# Patient Record
Sex: MALE | Race: BLACK OR AFRICAN AMERICAN | Employment: OTHER | URBAN - METROPOLITAN AREA
[De-identification: names, ages, dates, MRNs, and addresses within clinical notes are randomized per-mention and may not be internally consistent; named-entity substitution may affect disease eponyms.]

---

## 2017-01-11 ENCOUNTER — OFFICE VISIT (OUTPATIENT)
Dept: ENDOCRINOLOGY | Age: 72
End: 2017-01-11

## 2017-01-11 VITALS
BODY MASS INDEX: 43.63 KG/M2 | WEIGHT: 278 LBS | HEIGHT: 67 IN | DIASTOLIC BLOOD PRESSURE: 74 MMHG | HEART RATE: 89 BPM | SYSTOLIC BLOOD PRESSURE: 132 MMHG

## 2017-01-11 DIAGNOSIS — E11.8 TYPE 2 DIABETES MELLITUS WITH COMPLICATION, WITHOUT LONG-TERM CURRENT USE OF INSULIN (HCC): Primary | ICD-10-CM

## 2017-01-11 DIAGNOSIS — I10 ESSENTIAL HYPERTENSION: ICD-10-CM

## 2017-01-11 DIAGNOSIS — N28.9 MILD RENAL INSUFFICIENCY: ICD-10-CM

## 2017-01-11 NOTE — PROGRESS NOTES
History of Present Illness: Moisés Howard is a 66 y.o. male presents for follow-up of diabetes. Also has HTN. + microalbuminuria (improved 2015). Has CKD -overall trend has been stable     A1c has been higher than home glucose monitoring would suggest. Fructosamine appeared more concordant with home monitoring    Current diabetes regimen:  Metformin 1 g BID  Victoza 1.8 mg  Glipizide 2.5 mg BID  Jardiance 10 mg daily. - may forget to take meds periodically    Glucoses:  110-160 range. Monitors generally fasting, but may at other times. Diet:   Not as good over holidays. Having more sugared beverages and pasta and sweets. Plans to get back on track    Weight overall stable. Has maintained small degree of weight loss. Exercise: less active over winter. Not using bike anymore    HTN: BP controlled   Taking amlodipine and losartan. Kidneys: has been variable, but overall stable    Lipids - controlled without statin. He prefers not to take     Social:lives with wife ( who has been dx with pre-diabetes) and teenage grandson - [place]  [facility] football fan - likes Benggilda, Samina, [place] Fiore, Lucie . Past Medical History   Diagnosis Date    Arthritis      knee arthritis    Diabetes mellitus (HCC)     Hypertension     Obesity      Current Outpatient Prescriptions   Medication Sig    metFORMIN (GLUCOPHAGE) 1,000 mg tablet TAKE 1 TABLET TWICE A DAY WITH MEALS    amLODIPine (NORVASC) 10 mg tablet Take 1 Tab by mouth daily.  VICTOZA 3-RONI 0.6 mg/0.1 mL (18 mg/3 mL) sub-q pen INJECT 1.8 MG UNDER THE SKIN DAILY    losartan (COZAAR) 50 mg tablet TAKE 1 TABLET TWICE A DAY    glipiZIDE (GLUCOTROL) 5 mg tablet TAKE 1 TABLET TWICE A DAY    empagliflozin (JARDIANCE) 10 mg tablet Take 1 Tab by mouth daily.  UNIFINE PENTIPS PLUS 31 gauge x 1/4\" ndle FOR USE WITH INSULIN INJECT IONS DAILY    Insulin Needles, Disposable, (PEN NEEDLE) 32 x 5/32 \" ndle 1 each by SubCUTAneous route daily.     ONE TOUCH ULTRA TEST strip USE TO TEST BLOOD SUGAR THREE TIMES DAILY     No current facility-administered medications for this visit. Allergies   Allergen Reactions    Ace Inhibitors Cough       Review of Systems:  - Eyes: no blurry vision or double vision  - Cardiovascular: no chest pain  - Respiratory: no shortness of breath  - Musculoskeletal: no myalgias  - Neurological: no numbness/tingling in extremities. Following with foot doctor every 4 months    Physical Examination:  Visit Vitals    /74    Pulse 89    Ht 5' 7\" (1.702 m)    Wt 278 lb (126.1 kg)    BMI 43.54 kg/m2   -   - General: pleasant, no distress, normal gait   HEENT: hearing intact, EOMI, clear sclera without icterus  - Cardiovascular: regular, normal rate   - Respiratory: normal effort  - Integumentary: no edema  - Psychiatric: normal mood and affect    Data Reviewed:   Component      Latest Ref Rng & Units 9/14/2016 9/14/2016 9/14/2016 1/13/2016          11:02 AM 11:02 AM 11:02 AM 11:02 AM   Glucose      65 - 99 mg/dL  89     BUN      8 - 27 mg/dL  17     Creatinine      0.76 - 1.27 mg/dL  1.30 (H)     GFR est non-AA      >59 mL/min/1.73  54 (L)     GFR est AA      >59 mL/min/1.73  63     BUN/Creatinine ratio      10 - 22  13     Sodium      134 - 144 mmol/L  145 (H)     Potassium      3.5 - 5.2 mmol/L  4.4     Chloride      97 - 108 mmol/L  104     CO2      18 - 29 mmol/L  27     Calcium      8.6 - 10.2 mg/dL  9.3     Cholesterol, total      100 - 199 mg/dL    150   Triglyceride      0 - 149 mg/dL    74   HDL Cholesterol      >39 mg/dL    59   VLDL, calculated      5 - 40 mg/dL    15   LDL, calculated      0 - 99 mg/dL    76   Creatinine, urine      22.0 - 328.0 mg/dL       Microalbumin, urine      0.0 - 17.0 ug/mL       Microalbumin/Creat.  Ratio      0.0 - 30.0 mg/g creat       Hemoglobin A1c, (calculated)      4.8 - 5.6 %   7.4 (H)    Estimated average glucose      mg/dL   166    Fructosamine      0 - 285 umol/L 273 Component      Latest Ref Rng & Units 1/13/2016          11:02 AM   Glucose      65 - 99 mg/dL    BUN      8 - 27 mg/dL    Creatinine      0.76 - 1.27 mg/dL    GFR est non-AA      >59 mL/min/1.73    GFR est AA      >59 mL/min/1.73    BUN/Creatinine ratio      10 - 22    Sodium      134 - 144 mmol/L    Potassium      3.5 - 5.2 mmol/L    Chloride      97 - 108 mmol/L    CO2      18 - 29 mmol/L    Calcium      8.6 - 10.2 mg/dL    Cholesterol, total      100 - 199 mg/dL    Triglyceride      0 - 149 mg/dL    HDL Cholesterol      >39 mg/dL    VLDL, calculated      5 - 40 mg/dL    LDL, calculated      0 - 99 mg/dL    Creatinine, urine      22.0 - 328.0 mg/dL 111.8   Microalbumin, urine      0.0 - 17.0 ug/mL 88.7 (H)   Microalbumin/Creat. Ratio      0.0 - 30.0 mg/g creat 79.3 (H)       Assessment/Plan:   1. Type 2 diabetes mellitus with complication, without long-term current use of insulin ([facility])   - will check fructosamine and A1c due to prior discordance of A1c with home monitoring  - encouraged him to improve dietary and exercise efforts  - continue current medications. 2. Essential hypertension   - controlled. Continue losartan and amlodipine   3. BMI 40.0-44.9, adult ([address]   - encouraged further wt loss via dietary and exercise efforts to help maintain diabetes control. Also should help overall health   4. Mild renal insufficiency - repeat . BP and glucose contorl     Patient Instructions   Diabetes:    Continue efforts to improve diet and exercise work towards weight loss    Medications:  Continue metformin  Continue Victoza to 1.8 mg daily  Continue glipizide - take 1/2 tablet twice daily. Continue Jardiance 10 mg     Goals for blood sugars:  Fasting  (less than 150)  Other times -  (less than 180).     Blood pressure:  Continue  losartan to 50 mg twice daily  Continue amlodipine    Kidneys:  Look stable  Blood pressure and glucose control      Follow-up Disposition:  Return in about 4 months (around 5/11/2017).     Copy sent to:

## 2017-01-11 NOTE — MR AVS SNAPSHOT
Visit Information Date & Time Provider Department Dept. Phone Encounter #  
 1/11/2017  9:10 AM Júnior Erazo, [address] [place] [address] Diabetes and Endocrinology [id number] Follow-up Instructions Return in about 4 months (around 5/11/2017). Your Appointments 5/17/2017  9:00 AM  
ROUTINE CARE with Patricia Edgar MD  
[facility] [address]) Appt Note: 6 mon f/u for htn,etc  
 [facility] [address] [phone]  
[address] [facility] [address] Upcoming Health Maintenance Date Due Pneumococcal 65+ High/Highest Risk (2 of 2 - PPSV23) 7/11/2016 MICROALBUMIN Q1 1/13/2017 LIPID PANEL Q1 1/13/2017 HEMOGLOBIN A1C Q6M 3/14/2017 EYE EXAM RETINAL OR DILATED Q1 5/2/2017 MEDICARE YEARLY EXAM 5/17/2017 FOOT EXAM Q1 12/1/2017 GLAUCOMA SCREENING Q2Y 5/2/2018 COLONOSCOPY 5/15/2019 DTaP/Tdap/Td series (2 - Td) 5/28/2025 Allergies as of 1/11/2017  Review Complete On: 1/11/2017 By: Júnior Erazo MD  
  
 Severity Noted Reaction Type Reactions Ace Inhibitors  04/18/2014   Side Effect Cough Current Immunizations  Reviewed on 9/4/2013 Name Date Influenza Vaccine 11/26/2014 11:24 AM, 9/4/2013 Influenza Vaccine Deejay Melisa) 11/23/2015 Influenza Vaccine (Quad) PF 11/17/2016 Tdap 5/28/2015 Not reviewed this visit You Were Diagnosed With   
  
 Codes Comments Type 2 diabetes mellitus with complication, without long-term current use of insulin (HCC)    -  Primary ICD-10-CM: E11.8 ICD-9-CM: 250.90 Essential hypertension     ICD-10-CM: I10 
ICD-9-CM: 401.9 BMI 40.0-44.9, adult [facility])     ICD-10-CM: Z68.41 
ICD-9-CM: V85.41 Mild renal insufficiency     ICD-10-CM: N28.9 ICD-9-CM: 593.9 Vitals BP Pulse Height(growth percentile) Weight(growth percentile) BMI Smoking Status 132/74 89 5' 7\" (1.702 m) 278 lb (126.1 kg) 43.54 kg/m2 Former Smoker Vitals History BMI and BSA Data Body Mass Index Body Surface Area 43.54 kg/m 2 2.44 m 2 Preferred Pharmacy Pharmacy Name Phone [facility] [phone] - [address], [address] AT [place] [address] [phone] Your Updated Medication List  
  
   
This list is accurate as of: 1/11/17  9:57 AM.  Always use your most recent med list. amLODIPine 10 mg tablet Commonly known as:  Eloise Pace Take 1 Tab by mouth daily. empagliflozin 10 mg tablet Commonly known as:  Romilda Ponds Take 1 Tab by mouth daily. glipiZIDE 5 mg tablet Commonly known as:  GLUCOTROL  
TAKE 1 TABLET TWICE A DAY Insulin Needles (Disposable) 32 gauge x 5/32\" Ndle Commonly known as:  PEN NEEDLE  
1 each by SubCUTAneous route daily. UNIFINE PENTIPS PLUS 31 gauge x 1/4\" Ndle Generic drug:  Insulin Needles (Disposable) FOR USE WITH INSULIN INJECT IONS DAILY losartan 50 mg tablet Commonly known as:  COZAAR  
TAKE 1 TABLET TWICE A DAY  
  
 metFORMIN 1,000 mg tablet Commonly known as:  GLUCOPHAGE  
TAKE 1 TABLET TWICE A DAY WITH MEALS  
  
 ONETOUCH ULTRA TEST strip Generic drug:  glucose blood VI test strips USE TO TEST BLOOD SUGAR THREE TIMES DAILY  
  
 VICTOZA 3-RONI 0.6 mg/0.1 mL (18 mg/3 mL) sub-q pen Generic drug:  Liraglutide INJECT 1.8 MG UNDER THE SKIN DAILY We Performed the Following FRUCTOSAMINE [69064 CPT(R)] [place] COLLECTION VENOUS BLOOD,VENIPUNCTURE [id number] CPT(R)] [place] HANDLG&/OR CONVEY OF SPEC FOR TR OFFICE TO LAB [78636 CPT(R)] Follow-up Instructions Return in about 4 months (around 5/11/2017). Patient Instructions Diabetes: 
 
Continue efforts to improve diet and exercise work towards weight loss Medications: 
Continue metformin Continue Victoza to 1.8 mg daily Continue glipizide - take 1/2 tablet twice daily. Continue Jardiance 10 mg  
 
Goals for blood sugars: 
Fasting  (less than 150) Other times -  (less than 180). Blood pressure: 
Continue  losartan to 50 mg twice daily Continue amlodipine Kidneys: 
Look stable Blood pressure and glucose control Introducing \[facility]\"" & HEALTH SERVICES! Dear Monik Carlose: Thank you for requesting a [employer] account. Our records indicate that you already have an active [employer] account. You can access your account anytime at [email or web address] Did you know that you can access your hospital and ER discharge instructions at any time in Icecreaml[employer]? You can also review all of your test results from your hospital stay or ER visit. Additional Information If you have questions, please visit the Frequently Asked Questions section of the [employer] website at [email or web address] Remember, Icecre[employer]abs is NOT to be used for urgent needs. For medical emergencies, dial 911. Now available from your iPhone and Android! Please provide this summary of care documentation to your next provider. Your primary care clinician is listed as [place]. If you have any questions after today's visit, please call [phone].

## 2017-01-11 NOTE — PATIENT INSTRUCTIONS
Diabetes:    Continue efforts to improve diet and exercise work towards weight loss    Medications:  Continue metformin  Continue Victoza to 1.8 mg daily  Continue glipizide - take 1/2 tablet twice daily. Continue Jardiance 10 mg     Goals for blood sugars:  Fasting  (less than 150)  Other times -  (less than 180).     Blood pressure:  Continue  losartan to 50 mg twice daily  Continue amlodipine    Kidneys:  Look stable  Blood pressure and glucose control

## 2017-01-14 RX ORDER — PEN NEEDLE, DIABETIC 31 GX5/16"
NEEDLE, DISPOSABLE MISCELLANEOUS
Qty: 100 PEN NEEDLE | Refills: 3 | Status: SHIPPED | OUTPATIENT
Start: 2017-01-14 | End: 2018-05-07 | Stop reason: SDUPTHER

## 2017-04-05 RX ORDER — EMPAGLIFLOZIN 10 MG/1
TABLET, FILM COATED ORAL
Qty: 90 TAB | Refills: 2 | Status: SHIPPED | OUTPATIENT
Start: 2017-04-05 | End: 2018-01-10 | Stop reason: SDUPTHER

## 2017-04-05 RX ORDER — LOSARTAN POTASSIUM 50 MG/1
TABLET ORAL
Qty: 180 TAB | Refills: 1 | Status: SHIPPED | OUTPATIENT
Start: 2017-04-05 | End: 2017-10-17 | Stop reason: SDUPTHER

## 2017-05-10 ENCOUNTER — OFFICE VISIT (OUTPATIENT)
Dept: ENDOCRINOLOGY | Age: 72
End: 2017-05-10

## 2017-05-10 VITALS
BODY MASS INDEX: 44.26 KG/M2 | DIASTOLIC BLOOD PRESSURE: 65 MMHG | HEIGHT: 67 IN | WEIGHT: 282 LBS | HEART RATE: 87 BPM | SYSTOLIC BLOOD PRESSURE: 134 MMHG

## 2017-05-10 DIAGNOSIS — E11.29 TYPE 2 DIABETES MELLITUS WITH MICROALBUMINURIA, WITHOUT LONG-TERM CURRENT USE OF INSULIN (HCC): Primary | ICD-10-CM

## 2017-05-10 DIAGNOSIS — R80.9 TYPE 2 DIABETES MELLITUS WITH MICROALBUMINURIA, WITHOUT LONG-TERM CURRENT USE OF INSULIN (HCC): Primary | ICD-10-CM

## 2017-05-10 DIAGNOSIS — I10 HTN (HYPERTENSION), BENIGN: ICD-10-CM

## 2017-05-10 DIAGNOSIS — M25.50 ARTHRALGIA, UNSPECIFIED JOINT: ICD-10-CM

## 2017-05-10 RX ORDER — BLOOD SUGAR DIAGNOSTIC
STRIP MISCELLANEOUS
Qty: 100 STRIP | Refills: 3 | Status: SHIPPED | OUTPATIENT
Start: 2017-05-10 | End: 2019-02-26 | Stop reason: SDUPTHER

## 2017-05-10 NOTE — MR AVS SNAPSHOT
Visit Information Date & Time Provider Department Dept. Phone Encounter #  
 5/10/2017  9:50 AM Pauline Kitchen, [address] [facility] Diabetes and Endocrinology [phone] [id number] Follow-up Instructions Return in about 4 months (around 9/10/2017). Your Appointments 5/17/2017  9:00 AM  
ROUTINE CARE with Kyrie Mendez MD  
[facility]) Appt Note: 6 mon f/u for htn,etc  
 [facility] [address] [phone]  
[address] [facility] [address] [facility] Upcoming Health Maintenance Date Due Pneumococcal 65+ Low/Medium Risk (1 of 2 - PCV13) 6/30/2008 EYE EXAM RETINAL OR DILATED Q1 5/2/2017 MEDICARE YEARLY EXAM 5/17/2017 HEMOGLOBIN A1C Q6M 7/11/2017 INFLUENZA AGE 9 TO ADULT 8/1/2017 FOOT EXAM Q1 12/1/2017 MICROALBUMIN Q1 1/11/2018 LIPID PANEL Q1 1/11/2018 GLAUCOMA SCREENING Q2Y 5/2/2018 COLONOSCOPY 5/15/2019 DTaP/Tdap/Td series (2 - Td) 5/28/2025 Allergies as of 5/10/2017  Review Complete On: 5/10/2017 By: Pauline Kitchen MD  
  
 Severity Noted Reaction Type Reactions Ace Inhibitors  04/18/2014   Side Effect Cough Current Immunizations  Reviewed on 9/4/2013 Name Date Influenza Vaccine 11/26/2014 11:24 AM, 9/4/2013 Influenza Vaccine Torey Titus) 11/23/2015 Influenza Vaccine (Quad) PF 11/17/2016 Tdap 5/28/2015 Not reviewed this visit You Were Diagnosed With   
  
 Codes Comments Type 2 diabetes mellitus with microalbuminuria, without long-term current use of insulin (HCC)    -  Primary ICD-10-CM: E11.29, R80.9 ICD-9-CM: 250.40, 791.0   
 HTN (hypertension), benign     ICD-10-CM: I10 
ICD-9-CM: 401.1 BMI 40.0-44.9, adult [facility])     ICD-10-CM: Z68.41 
ICD-9-CM: V85.41 Arthralgia, unspecified joint     ICD-10-CM: M25.50 ICD-9-CM: 719.40 Vitals BP Pulse Height(growth percentile) Weight(growth percentile) BMI Smoking Status 134/65 87 5' 7\" (1.702 m) 282 lb (127.9 kg) 44.17 kg/m2 Former Smoker Vitals History BMI and BSA Data Body Mass Index Body Surface Area  
 44.17 kg/m 2 2.46 m 2 Preferred Pharmacy Pharmacy Name Phone [address] [phone] Your Updated Medication List  
  
   
This list is accurate as of: 5/10/17 10:42 AM.  Always use your most recent med list. amLODIPine 10 mg tablet Commonly known as:  Lizzy Joe Take 1 Tab by mouth daily. glipiZIDE 5 mg tablet Commonly known as:  GLUCOTROL  
TAKE 1 TABLET TWICE A DAY Insulin Needles (Disposable) 32 gauge x 5/32\" Ndle Commonly known as:  PEN NEEDLE  
1 each by SubCUTAneous route daily. * UNIFINE PENTIPS PLUS 31 gauge x 1/4\" Ndle Generic drug:  Insulin Needles (Disposable) FOR USE WITH INSULIN INJECT IONS DAILY * BD INSULIN PEN NEEDLE UF MINI 31 gauge x 3/16\" Ndle Generic drug:  Insulin Needles (Disposable) FOR USE WITH INSULIN INJECTIONS DAILY JARDIANCE 10 mg tablet Generic drug:  empagliflozin TAKE 1 TABLET DAILY losartan 50 mg tablet Commonly known as:  COZAAR  
TAKE 1 TABLET TWICE A DAY  
  
 metFORMIN 1,000 mg tablet Commonly known as:  GLUCOPHAGE  
TAKE 1 TABLET TWICE A DAY WITH MEALS  
  
 ONETOUCH VERIO strip Generic drug:  glucose blood VI test strips Monitor daily VICTOZA 3-RONI 0.6 mg/0.1 mL (18 mg/3 mL) sub-q pen Generic drug:  Liraglutide INJECT 1.8 MG UNDER THE SKIN DAILY * Notice: This list has 2 medication(s) that are the same as other medications prescribed for you. Read the directions carefully, and ask your doctor or other care provider to review them with you. Prescriptions Sent to Pharmacy Refills ONETOUCH VERIO strip 3 Sig: Monitor daily  Class: Normal  
 Pharmacy: [address], [address] [place] [address] #: [phone] We Performed the Following FRUCTOSAMINE [14610 CPT(R)] HEMOGLOBIN A1C WITH EAG [40444 CPT(R)] METABOLIC PANEL, COMPREHENSIVE [04482 CPT(R)] CO COLLECTION VENOUS BLOOD,VENIPUNCTURE [id number] CPT(R)] CO HANDLG&/OR CONVEY OF SPEC FOR TR OFFICE TO LAB [33264 CPT(R)] Follow-up Instructions Return in about 4 months (around 9/10/2017). Patient Instructions Diabetes: 
 
Continue efforts to improve diet and exercise work towards weight loss Medications: 
Continue metformin Continue Victoza to 1.8 mg daily Continue glipizide -MAY have you increase this to whole tablet twice daily if you consistently see glucoses above goal. Goal would be to lose as little of this as you need. Decrease if you have lower values (<90) Continue Jardiance 10 mg  
 
Goals for blood sugars: 
Fasting  (less than 150) Other times -  (less than 180). Blood pressure: 
Continue  losartan to 50 mg twice daily Continue amlodipine Kidneys: 
Look stable Blood pressure and glucose control Introducing [facility] & HEALTH SERVICES! Dear Jean Pierre Skill: Thank you for requesting a [employer] account. Our records indicate that you already have an active [employer] account. You can access your account anytime at [email or web address] Did you know that you can access your hospital and ER discharge instructions at any time in Learn[employer]? You can also review all of your test results from your hospital stay or ER visit. Additional Information If you have questions, please visit the Frequently Asked Questions section of the [employer] website at [email or web address] Remember, [employer] is NOT to be used for urgent needs. For medical emergencies, dial 911. Now available from your iPhone and Android! Please provide this summary of care documentation to your next provider. Your primary care clinician is listed as [place]. If you have any questions after today's visit, please call [phone].

## 2017-05-10 NOTE — PATIENT INSTRUCTIONS
Diabetes:    Continue efforts to improve diet and exercise work towards weight loss    Medications:  Continue metformin  Continue Victoza to 1.8 mg daily  Continue glipizide -MAY have you increase this to whole tablet twice daily if you consistently see glucoses above goal. Goal would be to lose as little of this as you need. Decrease if you have lower values (<90)  Continue Jardiance 10 mg     Goals for blood sugars:  Fasting  (less than 150)  Other times -  (less than 180).     Blood pressure:  Continue  losartan to 50 mg twice daily  Continue amlodipine    Kidneys:  Look stable  Blood pressure and glucose control

## 2017-05-10 NOTE — PROGRESS NOTES
History of Present Illness: Ratna Ag is a 66 y.o. male presents for follow-up of diabetes. Also has HTN. + microalbuminuria. Has CKD -overall trend has been stable     Current diabetes regimen:  Metformin 1 g BID  Victoza 1.8 mg  Glipizide 2.5 mg BID  Jardiance 10 mg daily. Glucoses:  No log or meter. This AM value was 176. Other meter said 115. 111 in office    Diet:   Generally eating pretty good, but sometimes 'stray away'. Had some apple pie last night. Weight overall stable. Exercise: limited by arthralgias. HTN: BP controlled   Taking amlodipine and losartan. Kidneys: has been variable, but overall stable    Lipids - controlled without statin. He prefers not to take     Social:lives with wife and teenage grandson - [place]      Past Medical History:   Diagnosis Date    Arthritis     knee arthritis    Diabetes mellitus ([facility] [address]     Hypertension     Obesity      Current Outpatient Prescriptions   Medication Sig    JARDIANCE 10 mg tablet TAKE 1 TABLET DAILY    losartan (COZAAR) 50 mg tablet TAKE 1 TABLET TWICE A DAY    BD INSULIN PEN NEEDLE UF MINI 31 gauge x 3/16\" ndle FOR USE WITH INSULIN INJECTIONS DAILY    metFORMIN (GLUCOPHAGE) 1,000 mg tablet TAKE 1 TABLET TWICE A DAY WITH MEALS    amLODIPine (NORVASC) 10 mg tablet Take 1 Tab by mouth daily.  VICTOZA 3-RONI 0.6 mg/0.1 mL (18 mg/3 mL) sub-q pen INJECT 1.8 MG UNDER THE SKIN DAILY    glipiZIDE (GLUCOTROL) 5 mg tablet TAKE 1 TABLET TWICE A DAY    UNIFINE PENTIPS PLUS 31 gauge x 1/4\" ndle FOR USE WITH INSULIN INJECT IONS DAILY    Insulin Needles, Disposable, (PEN NEEDLE) 32 x 5/32 \" ndle 1 each by SubCUTAneous route daily.  ONE TOUCH ULTRA TEST strip USE TO TEST BLOOD SUGAR THREE TIMES DAILY     No current facility-administered medications for this visit.       Allergies   Allergen Reactions    Ace Inhibitors Cough       Review of Systems:  - Eyes: no blurry vision or double vision  - Cardiovascular: no chest pain  - Respiratory: no shortness of breath  - Musculoskeletal: see HPI  - Neurological: no numbness/tingling in extremities    Physical Examination:  Visit Vitals    /65    Pulse 87    Ht 5' 7\" (1.702 m)    Wt 282 lb (127.9 kg)    BMI 44.17 kg/m2   -   - General: pleasant, no distress, normal gait   HEENT: hearing intact, EOMI, clear sclera without icterus  - Cardiovascular: regular, normal rate   - Respiratory: normal effort  - Integumentary: no edema. No apparent swelling of joints  - Psychiatric: normal mood and affect    Data Reviewed:   Component      Latest Ref Rng & Units 1/11/2017 1/11/2017 1/11/2017 1/11/2017          12:00 AM 12:00 AM 12:00 AM 12:00 AM   Glucose      65 - 99 mg/dL    92   BUN      8 - 27 mg/dL    16   Creatinine      0.76 - 1.27 mg/dL    1.28 (H)   GFR est non-AA      >59 mL/min/1.73    55 (L)   GFR est AA      >59 mL/min/1.73    64   BUN/Creatinine ratio      10 - 22    13   Sodium      134 - 144 mmol/L    141   Potassium      3.5 - 5.2 mmol/L    4.2   Chloride      96 - 106 mmol/L    100   CO2      18 - 29 mmol/L    22   Calcium      8.6 - 10.2 mg/dL    9.6   Protein, total      6.0 - 8.5 g/dL    7.9   Albumin      3.5 - 4.8 g/dL    4.3   GLOBULIN, TOTAL      1.5 - 4.5 g/dL    3.6   A-G Ratio      1.1 - 2.5    1.2   Bilirubin, total      0.0 - 1.2 mg/dL    0.6   Alk. phosphatase      39 - 117 IU/L    70   AST      0 - 40 IU/L    15   ALT      0 - 44 IU/L    15   Cholesterol, total      100 - 199 mg/dL   158    Triglyceride      0 - 149 mg/dL   91    HDL Cholesterol      >39 mg/dL   56    VLDL, calculated      5 - 40 mg/dL   18    LDL, calculated      0 - 99 mg/dL   84    Creatinine, urine      Not Estab. mg/dL  82.3     Microalbumin, urine      Not Estab. ug/mL  70.7     Microalbumin/Creat.  Ratio      0.0 - 30.0 mg/g creat  85.9 (H)     Hemoglobin A1c, (calculated)      4.8 - 5.6 %       Estimated average glucose      mg/dL       Fructosamine      0 - 285 umol/L 306 (H) Component      Latest Ref Rng & Units 1/11/2017          12:00 AM   Glucose      65 - 99 mg/dL    BUN      8 - 27 mg/dL    Creatinine      0.76 - 1.27 mg/dL    GFR est non-AA      >59 mL/min/1.73    GFR est AA      >59 mL/min/1.73    BUN/Creatinine ratio      10 - 22    Sodium      134 - 144 mmol/L    Potassium      3.5 - 5.2 mmol/L    Chloride      96 - 106 mmol/L    CO2      18 - 29 mmol/L    Calcium      8.6 - 10.2 mg/dL    Protein, total      6.0 - 8.5 g/dL    Albumin      3.5 - 4.8 g/dL    GLOBULIN, TOTAL      1.5 - 4.5 g/dL    A-G Ratio      1.1 - 2.5    Bilirubin, total      0.0 - 1.2 mg/dL    Alk. phosphatase      39 - 117 IU/L    AST      0 - 40 IU/L    ALT      0 - 44 IU/L    Cholesterol, total      100 - 199 mg/dL    Triglyceride      0 - 149 mg/dL    HDL Cholesterol      >39 mg/dL    VLDL, calculated      5 - 40 mg/dL    LDL, calculated      0 - 99 mg/dL    Creatinine, urine      Not Estab. mg/dL    Microalbumin, urine      Not Estab. ug/mL    Microalbumin/Creat. Ratio      0.0 - 30.0 mg/g creat    Hemoglobin A1c, (calculated)      4.8 - 5.6 % 7.7 (H)   Estimated average glucose      mg/dL 174   Fructosamine      0 - 285 umol/L        Assessment/Plan:   1. Type 2 diabetes mellitus with microalbuminuria, without long-term current use of insulin ([facility])   - check a1c. Optimistic value will be improved  - encouraged dietary efforts, increase in exercise and weight loss. Could stop glipizide if lows arise. Conversely, can increase if needed for higher values. Continue other medications: victoza, Jardiance, metformin   2. HTN (hypertension), benign   - continue losartan and amlodipine. Controlled. 3. BMI 40.0-44.9, adult ([address] - encouraged dietary and exercise efforts. Not taking glipizide would help, if glucoses are controlled without. 4. Arthralgia, unspecified joint   - suspect this may improve with dietary efforts, regular exercise (increasing gradually) and assurance of good sleep.       Patient Instructions   Diabetes:    Continue efforts to improve diet and exercise work towards weight loss    Medications:  Continue metformin  Continue Victoza to 1.8 mg daily  Continue glipizide -MAY have you increase this to whole tablet twice daily if you consistently see glucoses above goal. Goal would be to lose as little of this as you need. Decrease if you have lower values (<90)  Continue Jardiance 10 mg     Goals for blood sugars:  Fasting  (less than 150)  Other times -  (less than 180). Blood pressure:  Continue  losartan to 50 mg twice daily  Continue amlodipine    Kidneys:  Look stable  Blood pressure and glucose control      Follow-up Disposition:  Return in about 4 months (around 9/10/2017).     Copy sent to:

## 2017-05-11 LAB
ALBUMIN SERPL-MCNC: 4.4 G/DL (ref 3.5–4.8)
ALBUMIN/GLOB SERPL: 1.3 {RATIO} (ref 1.2–2.2)
ALP SERPL-CCNC: 71 IU/L (ref 39–117)
ALT SERPL-CCNC: 16 IU/L (ref 0–44)
AST SERPL-CCNC: 15 IU/L (ref 0–40)
BILIRUB SERPL-MCNC: 0.4 MG/DL (ref 0–1.2)
BUN SERPL-MCNC: 15 MG/DL (ref 8–27)
BUN/CREAT SERPL: 12 (ref 10–24)
CALCIUM SERPL-MCNC: 9.3 MG/DL (ref 8.6–10.2)
CHLORIDE SERPL-SCNC: 104 MMOL/L (ref 96–106)
CO2 SERPL-SCNC: 24 MMOL/L (ref 18–29)
CREAT SERPL-MCNC: 1.22 MG/DL (ref 0.76–1.27)
EST. AVERAGE GLUCOSE BLD GHB EST-MCNC: 186 MG/DL
FRUCTOSAMINE SERPL-SCNC: 334 UMOL/L (ref 0–285)
GLOBULIN SER CALC-MCNC: 3.4 G/DL (ref 1.5–4.5)
GLUCOSE SERPL-MCNC: 96 MG/DL (ref 65–99)
HBA1C MFR BLD: 8.1 % (ref 4.8–5.6)
INTERPRETATION: NORMAL
Lab: NORMAL
POTASSIUM SERPL-SCNC: 4.7 MMOL/L (ref 3.5–5.2)
PROT SERPL-MCNC: 7.8 G/DL (ref 6–8.5)
SODIUM SERPL-SCNC: 146 MMOL/L (ref 134–144)

## 2017-05-17 ENCOUNTER — OFFICE VISIT (OUTPATIENT)
Dept: INTERNAL MEDICINE CLINIC | Age: 72
End: 2017-05-17

## 2017-05-17 VITALS
RESPIRATION RATE: 16 BRPM | BODY MASS INDEX: 44.17 KG/M2 | DIASTOLIC BLOOD PRESSURE: 69 MMHG | SYSTOLIC BLOOD PRESSURE: 126 MMHG | HEIGHT: 67 IN | HEART RATE: 83 BPM | OXYGEN SATURATION: 95 % | TEMPERATURE: 98.1 F | WEIGHT: 281.4 LBS

## 2017-05-17 DIAGNOSIS — I10 ESSENTIAL HYPERTENSION: ICD-10-CM

## 2017-05-17 DIAGNOSIS — N28.9 MILD RENAL INSUFFICIENCY: ICD-10-CM

## 2017-05-17 DIAGNOSIS — Z13.39 SCREENING FOR ALCOHOLISM: ICD-10-CM

## 2017-05-17 DIAGNOSIS — Z00.00 ROUTINE GENERAL MEDICAL EXAMINATION AT A HEALTH CARE FACILITY: Primary | ICD-10-CM

## 2017-05-17 NOTE — PROGRESS NOTES
1. Have you been to the ER, urgent care clinic since your last visit? Hospitalized since your last visit?no    2. Have you seen or consulted any other health care providers outside of the [facility] since your last visit? Include any pap smears or colon screening.  no

## 2017-05-17 NOTE — PATIENT INSTRUCTIONS
Medicare Part B Preventive Services Limitations Recommendation Scheduled   Bone Mass Measurement  (age 72 & older, biennial) Requires diagnosis related to osteoporosis or estrogen deficiency. Biennial benefit unless patient has history of long-term glucocorticoid tx or baseline is needed because initial test was by other method     Cardiovascular Screening Blood Tests (every 5 years)  Total cholesterol, HDL, Triglycerides Order as a panel if possible     Colorectal Cancer Screening  -Fecal occult blood test (annual)  -Flexible sigmoidoscopy (5y)  -Screening colonoscopy (10y)  -Barium Enema      Counseling to Prevent Tobacco Use (up to 8 sessions per year)  - Counseling greater than 3 and up to 10 minutes  - Counseling greater than 10 minutes Patients must be asymptomatic of tobacco-related conditions to receive as preventive service     Diabetes Screening Tests (at least every 3 years, Medicare covers annually or at 6-month intervals for prediabetic patients)    Fasting blood sugar (FBS) or glucose tolerance test (GTT) Patient must be diagnosed with one of the following:  -Hypertension, Dyslipidemia, obesity, previous impaired FBS or GTT  Or any two of the following: overweight, FH of diabetes, age ? 72, history of gestational diabetes, birth of baby weighing more than 9 pounds     Diabetes Self-Management Training (DSMT) (no USPSTF recommendation) Requires referral by treating physician for patient with diabetes or renal disease. 10 hours of initial DSMT session of no less than 30 minutes each in a continuous 12-month period. 2 hours of follow-up DSMT in subsequent years.      Glaucoma Screening (no USPSTF recommendation) Diabetes mellitus, family history, , age 48 or over,  American, age 72 or over     Human Immunodeficiency Virus (HIV) Screening (annually for increased risk patients)  HIV-1 and HIV-2 by EIA, KAREN, rapid antibody test, or oral mucosa transudate Patient must be at increased risk for HIV infection per USPSTF guidelines or pregnant. Tests covered annually for patients at increased risk. Pregnant patients may receive up to 3 test during pregnancy. Medical Nutrition Therapy (MNT) (for diabetes or renal disease not recommended schedule) Requires referral by treating physician for patient with diabetes or renal disease. Can be provided in same year as diabetes self-management training (DSMT), and CMS recommends medical nutrition therapy take place after DSMT. Up to 3 hours for initial year and 2 hours in subsequent years. Prostate Cancer Screening (annually up to age 76)  - Digital rectal exam (SAM)  - Prostate specific antigen (PSA) Annually (age 48 or over), SAM not paid separately when covered E/M service is provided on same date  Men up to age 76 may need a screening blood test for prostate cancer at certain intervals, depending on their personal and family history. This decision is between the patient and his provider. Seasonal Influenza Vaccination (annually)        Pneumococcal Vaccination (once after 72)      Hepatitis B Vaccinations (if medium/high risk) Medium/high risk factors:  End-stage renal disease,  Hemophiliacs who received Factor VIII or IX concentrates, Clients of institutions for the mentally retarded, Persons who live in the same house as a HepB virus carrier, Homosexual men, Illicit injectable drug abusers. Shingles Vaccination A shingles vaccine is also recommended once in a lifetime after age 61     Ultrasound Screening for Abdominal Aortic Aneurysm (AAA) (once) Patient must be referred through [facility] and not have had a screening for abdominal aortic aneurysm before under Medicare.   Limited to patients who meet one of the following criteria:  - Men who are 73-68 years old and have smoked more than 100 cigarettes in their lifetime.  -Anyone with a FH of AAA  -Anyone recommended for screening by USPSTF            Learning About Cutting Calories  How do calories affect your weight? Food gives your body energy. Energy from the food you eat is measured in calories. This energy keeps your heart beating, your brain active, and your muscles working. Your body needs a certain number of calories each day. After your body uses the calories it needs, it stores extra calories as fat. To lose weight safely, you have to eat fewer calories while eating in a healthy way. How many calories do you need each day? The more active you are, the more calories you need. When you are less active, you need fewer calories. How many calories you need each day also depends on several things, including your age and whether you are male or female. Here are some general guidelines for adults:  · Less active women and older adults need 1,600 to 2,000 calories each day. · Active women and less active men need 2,000 to 2,400 calories each day. · Active men need 2,400 to 3,000 calories each day. How can you cut calories and eat healthy meals? Whole grains, vegetables and fruits, and dried beans are good lower-calorie foods. They give you lots of nutrients and fiber. And they fill you up. Sweets, energy drinks, and soda pop are high in calories. They give you few nutrients and no fiber. Try to limit soda pop, fruit juice, and energy drinks. Drink water instead. Some fats can be part of a healthy diet. But cutting back on fats from highly processed foods like fast foods and many snack foods is a good way to lower the calories in your diet. Also, use smaller amounts of fats like butter, margarine, salad dressing, and mayonnaise. Add fresh garlic, lemon, or herbs to your meals to add flavor without adding fat. Meats and dairy products can be a big source of hidden fats. Try to choose lean or low-fat versions of these products. Fat-free cookies, candies, chips, and frozen treats can still be high in sugar and calories. Some fat-free foods have more calories than regular ones.  Eat fat-free treats in moderation, as you would other foods. If your favorite foods are high in fat, salt, sugar, or calories, limit how often you eat them. Eat smaller servings, or look for healthy substitutes. Fill up on fruits, vegetables, and whole grains. Eating at home  · Use meat as a side dish instead of as the main part of your meal.  · Try main dishes that use whole wheat pasta, brown rice, dried beans, or vegetables. · Find ways to cook with little or no fat, such as broiling, steaming, or grilling. · Use cooking spray instead of oil. If you use oil, use a monounsaturated oil, such as canola or olive oil. · Trim fat from meats before you cook them. · Drain off fat after you brown the meat or while you roast it. · Chill soups and stews after you cook them. Then skim the fat off the top after it hardens. Eating out  · Order foods that are broiled or poached rather than fried or breaded. · Cut back on the amount of butter or margarine that you use on bread. · Order sauces, gravies, and salad dressings on the side, and use only a little. · When you order pasta, choose tomato sauce rather than cream sauce. · Ask for salsa with your baked potato instead of sour cream, butter, cheese, or gipson. · Order meals in a small size instead of upgrading to a large. · Share an entree, or take part of your food home to eat as another meal.  · Share appetizers and desserts. Where can you learn more? Go to http://milka-pilar.info/. Enter 99 [id number] in the search box to learn more about \"Learning About Cutting Calories. \"  Current as of: November 9, 2016  Content Version: 11.2  © 6241-0564 [employer]. Care instructions adapted under license by [employer] (which disclaims liability or warranty for this information).  If you have questions about a medical condition or this instruction, always ask your healthcare professional. Susanne Maldonado disclaims any warranty or liability for your use of this information.

## 2017-05-17 NOTE — MR AVS SNAPSHOT
Visit Information Date & Time Provider Department Dept. Phone Encounter #  
 5/17/2017  9:00 AM Debbie Lopez, [address] [place] [address],[address] [phone] [id number] Follow-up Instructions Return in about 6 months (around 11/17/2017) for HTN, etc.  
  
Your Appointments 9/6/2017  8:30 AM  
Follow Up with Janneth Thomas MD  
[facility] [address] [place] [address]) Appt Note: f/u                                      4 month  
 [address] [facility] [address] [id number] [facility] Upcoming Health Maintenance Date Due  
 EYE EXAM RETINAL OR DILATED Q1 5/2/2017 MEDICARE YEARLY EXAM 5/17/2017 INFLUENZA AGE 9 TO ADULT 8/1/2017 HEMOGLOBIN A1C Q6M 11/10/2017 Pneumococcal 65+ Low/Medium Risk (2 of 2 - PPSV23) 11/17/2017 FOOT EXAM Q1 12/1/2017 MICROALBUMIN Q1 1/11/2018 LIPID PANEL Q1 1/11/2018 GLAUCOMA SCREENING Q2Y 5/2/2018 COLONOSCOPY 5/15/2019 DTaP/Tdap/Td series (2 - Td) 5/28/2025 Allergies as of 5/17/2017  Review Complete On: 5/17/2017 By: Debbie Lopez MD  
  
 Severity Noted Reaction Type Reactions Ace Inhibitors  04/18/2014   Side Effect Cough Current Immunizations  Reviewed on 5/17/2017 Name Date Influenza Vaccine 11/26/2014 11:24 AM, 9/4/2013 Influenza Vaccine Adelina Rice) 11/23/2015 Influenza Vaccine (Quad) PF 11/17/2016 Pneumococcal Conjugate (PCV-13) 11/17/2016 Tdap 5/28/2015 Reviewed by Debbie Lopez MD on 5/17/2017 at  9:37 AM  
 Reviewed by Debbie Lopez MD on 5/17/2017 at  9:37 AM  
You Were Diagnosed With   
  
 Codes Comments Essential hypertension    -  Primary ICD-10-CM: I10 
ICD-9-CM: 401.9 Mild renal insufficiency     ICD-10-CM: N28.9 ICD-9-CM: 593.9 Vitals BP Pulse Temp Resp Height(growth percentile) Weight(growth percentile) 126/69 (BP 1 Location: Left arm, BP Patient Position: Sitting) 83 98.1 °F (36.7 °C) (Oral) 16 5' 7\" (1.702 m) 281 lb 6.4 oz (127.6 kg) SpO2 BMI Smoking Status 95% 44.07 kg/m2 Former Smoker Vitals History BMI and BSA Data Body Mass Index Body Surface Area 44.07 kg/m 2 2.46 m 2 Preferred Pharmacy Pharmacy Name [facility]  [phone], [address] AT [place] [address] [phone] Your Updated Medication List  
  
   
This list is accurate as of: 5/17/17 10:04 AM.  Always use your most recent med list. amLODIPine 10 mg tablet Commonly known as:  Mary Half Take 1 Tab by mouth daily. glipiZIDE 5 mg tablet Commonly known as:  GLUCOTROL  
TAKE 1 TABLET TWICE A DAY Insulin Needles (Disposable) 32 gauge x 5/32\" Ndle Commonly known as:  PEN NEEDLE  
1 each by SubCUTAneous route daily. * UNIFINE PENTIPS PLUS 31 gauge x 1/4\" Ndle Generic drug:  Insulin Needles (Disposable) FOR USE WITH INSULIN INJECT IONS DAILY * BD INSULIN PEN NEEDLE UF MINI 31 gauge x 3/16\" Ndle Generic drug:  Insulin Needles (Disposable) FOR USE WITH INSULIN INJECTIONS DAILY JARDIANCE 10 mg tablet Generic drug:  empagliflozin TAKE 1 TABLET DAILY losartan 50 mg tablet Commonly known as:  COZAAR  
TAKE 1 TABLET TWICE A DAY  
  
 metFORMIN 1,000 mg tablet Commonly known as:  GLUCOPHAGE  
TAKE 1 TABLET TWICE A DAY WITH MEALS  
  
 ONETOUCH VERIO strip Generic drug:  glucose blood VI test strips Monitor daily VICTOZA 3-RONI 0.6 mg/0.1 mL (18 mg/3 mL) sub-q pen Generic drug:  Liraglutide INJECT 1.8 MG UNDER THE SKIN DAILY * Notice: This list has 2 medication(s) that are the same as other medications prescribed for you. Read the directions carefully, and ask your doctor or other care provider to review them with you. Follow-up Instructions Return in about 6 months (around 11/17/2017) for HTN, etc.  
  
  
Patient Instructions Medicare Part B Preventive Services Limitations Recommendation Scheduled Bone Mass Measurement 
(age 72 & older, biennial) Requires diagnosis related to osteoporosis or estrogen deficiency. Biennial benefit unless patient has history of long-term glucocorticoid tx or baseline is needed because initial test was by other method Cardiovascular Screening Blood Tests (every 5 years) Total cholesterol, HDL, Triglycerides Order as a panel if possible Colorectal Cancer Screening 
-Fecal occult blood test (annual) -Flexible sigmoidoscopy (5y) 
-Screening colonoscopy (10y) -Barium Enema Counseling to Prevent Tobacco Use (up to 8 sessions per year) - Counseling greater than 3 and up to 10 minutes - Counseling greater than 10 minutes Patients must be asymptomatic of tobacco-related conditions to receive as preventive service Diabetes Screening Tests (at least every 3 years, Medicare covers annually or at 6-month intervals for prediabetic patients) Fasting blood sugar (FBS) or glucose tolerance test (GTT) Patient must be diagnosed with one of the following: 
-Hypertension, Dyslipidemia, obesity, previous impaired FBS or GTT 
Or any two of the following: overweight, FH of diabetes, age ? 72, history of gestational diabetes, birth of baby weighing more than 9 pounds Diabetes Self-Management Training (DSMT) (no USPSTF recommendation) Requires referral by treating physician for patient with diabetes or renal disease. 10 hours of initial DSMT session of no less than 30 minutes each in a continuous 12-month period. 2 hours of follow-up DSMT in subsequent years. Glaucoma Screening (no USPSTF recommendation) Diabetes mellitus, family history, , age 48 or over,  American, age 72 or over Human Immunodeficiency Virus (HIV) Screening (annually for increased risk patients) HIV-1 and HIV-2 by EIA, KAREN, rapid antibody test, or oral mucosa transudate Patient must be at increased risk for HIV infection per USPSTF guidelines or pregnant. Tests covered annually for patients at increased risk. Pregnant patients may receive up to 3 test during pregnancy. Medical Nutrition Therapy (MNT) (for diabetes or renal disease not recommended schedule) Requires referral by treating physician for patient with diabetes or renal disease. Can be provided in same year as diabetes self-management training (DSMT), and CMS recommends medical nutrition therapy take place after DSMT. Up to 3 hours for initial year and 2 hours in subsequent years. Prostate Cancer Screening (annually up to age 76) - Digital rectal exam (SAM) - Prostate specific antigen (PSA) Annually (age 48 or over), SAM not paid separately when covered E/M service is provided on same date Men up to age 76 may need a screening blood test for prostate cancer at certain intervals, depending on their personal and family history. This decision is between the patient and his provider. Seasonal Influenza Vaccination (annually) Pneumococcal Vaccination (once after 65) Hepatitis B Vaccinations (if medium/high risk) Medium/high risk factors:  End-stage renal disease, Hemophiliacs who received Factor VIII or IX concentrates, Clients of institutions for the mentally retarded, Persons who live in the same house as a HepB virus carrier, Homosexual men, Illicit injectable drug abusers. Shingles Vaccination A shingles vaccine is also recommended once in a lifetime after age 61 Ultrasound Screening for Abdominal Aortic Aneurysm (AAA) (once) Patient must be referred through IPPE and not have had a screening for abdominal aortic aneurysm before under Medicare. Limited to patients who meet one of the following criteria: 
- Men who are 73-68 years old and have smoked more than 100 cigarettes in their lifetime. -Anyone with a FH of AAA 
-Anyone recommended for screening by USPSTF Learning About Cutting Calories How do calories affect your weight? Food gives your body energy. Energy from the food you eat is measured in calories. This energy keeps your heart beating, your brain active, and your muscles working. Your body needs a certain number of calories each day. After your body uses the calories it needs, it stores extra calories as fat. To lose weight safely, you have to eat fewer calories while eating in a healthy way. How many calories do you need each day? The more active you are, the more calories you need. When you are less active, you need fewer calories. How many calories you need each day also depends on several things, including your age and whether you are male or female. Here are some general guidelines for adults: 
· Less active women and older adults need 1,600 to 2,000 calories each day. · Active women and less active men need 2,000 to 2,400 calories each day. · Active men need 2,400 to 3,000 calories each day. How can you cut calories and eat healthy meals? Whole grains, vegetables and fruits, and dried beans are good lower-calorie foods. They give you lots of nutrients and fiber. And they fill you up. Sweets, energy drinks, and soda pop are high in calories. They give you few nutrients and no fiber. Try to limit soda pop, fruit juice, and energy drinks. Drink water instead. Some fats can be part of a healthy diet. But cutting back on fats from highly processed foods like fast foods and many snack foods is a good way to lower the calories in your diet. Also, use smaller amounts of fats like butter, margarine, salad dressing, and mayonnaise. Add fresh garlic, lemon, or herbs to your meals to add flavor without adding fat. Meats and dairy products can be a big source of hidden fats. Try to choose lean or low-fat versions of these products. Fat-free cookies, candies, chips, and frozen treats can still be high in sugar and calories. Some fat-free foods have more calories than regular ones. Eat fat-free treats in moderation, as you would other foods. If your favorite foods are high in fat, salt, sugar, or calories, limit how often you eat them. Eat smaller servings, or look for healthy substitutes. Fill up on fruits, vegetables, and whole grains. Eating at home · Use meat as a side dish instead of as the main part of your meal. 
· Try main dishes that use whole wheat pasta, brown rice, dried beans, or vegetables. · Find ways to cook with little or no fat, such as broiling, steaming, or grilling. · Use cooking spray instead of oil. If you use oil, use a monounsaturated oil, such as canola or olive oil. · Trim fat from meats before you cook them. · Drain off fat after you brown the meat or while you roast it. · Chill soups and stews after you cook them. Then skim the fat off the top after it hardens. Eating out · Order foods that are broiled or poached rather than fried or breaded. · Cut back on the amount of butter or margarine that you use on bread. · Order sauces, gravies, and salad dressings on the side, and use only a little. · When you order pasta, choose tomato sauce rather than cream sauce. · Ask for salsa with your baked potato instead of sour cream, butter, cheese, or gipson. · Order meals in a small size instead of upgrading to a large. · Share an entree, or take part of your food home to eat as another meal. 
· Share appetizers and desserts. Where can you learn more? Go to http://milka-pilar.info/. Enter 99 [id number] in the search box to learn more about \"Learning About Cutting Calories. \" Current as of: November 9, 2016 Content Version: 11.2 © 8241-5991 [employer].  Care instructions adapted under license by [employer] (which disclaims liability or warranty for this information). If you have questions about a medical condition or this instruction, always ask your healthcare professional. [address] any warranty or liability for your use of this information. Introducing [facility]! Dear Anastasiia Hirsch: Thank you for requesting a Tripwire account. Our records indicate that you already have an active Tripwire account. You can access your account anytime at [email or web address] Did you know that you can access your hospital and ER discharge instructions at any time in Tripwire? You can also review all of your test results from your hospital stay or ER visit. Additional Information If you have questions, please visit the Frequently Asked Questions section of the Tripwire website at [email or web address] Remember, Tripwire is NOT to be used for urgent needs. For medical emergencies, dial 911. Now available from your iPhone and Android! Please provide this summary of care documentation to your next provider. Your primary care clinician is listed as [place]. If you have any questions after today's visit, please call [phone].

## 2017-05-17 NOTE — PROGRESS NOTES
SUBJECTIVE:   Mr. Cailin Kaba is a 66 y.o. male who is here for follow up of routine medical issues. Previously he saw Dr. David Roach. Chief Complaint   Patient presents with    Hypertension    Follow-up     pt here today for 6 month f.u; pt due for wellness 5/17/17, discuss advance care plan    Other     pt states that she has eye exam scheduled for 6/20/17; pt had colonscopy done 2 yrs ago       He has been diabetic since about 2005. He sees Dr. Nasrin Ramey as well. His BP at home is running [id number] systolic. High here today. Knee pain \"every now and then; doing fine for the most part. \"  L knee. Also stiffness in AM for a little while, a few minutes (less than 15)--back, hips. \"Gets better once I get moving around. \"   At this time, he is otherwise doing well and has brought no other complaints to my attention today. For a list of the medical issues addressed today, see the assessment and plan below. PMH:   Past Medical History:   Diagnosis Date    Arthritis     knee arthritis    Diabetes mellitus ([address]     Hypertension     Obesity        Past Surgical History:   Procedure Laterality Date    HX CATARACT REMOVAL  ~ 2009    Bilateral    HX ORTHOPAEDIC Left 12/13/2013    shoulder    [place] COLONOSCOPY FLX DX W/COLLJ SPEC WHEN PFRMD  5/15/2014    Dr. Deanne De Jesus       All: He is allergic to ace inhibitors. Current Outpatient Prescriptions   Medication Sig    ONETOUCH VERIO strip Monitor daily    JARDIANCE 10 mg tablet TAKE 1 TABLET DAILY    losartan (COZAAR) 50 mg tablet TAKE 1 TABLET TWICE A DAY    BD INSULIN PEN NEEDLE UF MINI 31 gauge x 3/16\" ndle FOR USE WITH INSULIN INJECTIONS DAILY    metFORMIN (GLUCOPHAGE) 1,000 mg tablet TAKE 1 TABLET TWICE A DAY WITH MEALS    amLODIPine (NORVASC) 10 mg tablet Take 1 Tab by mouth daily.     VICTOZA 3-RONI 0.6 mg/0.1 mL (18 mg/3 mL) sub-q pen INJECT 1.8 MG UNDER THE SKIN DAILY    glipiZIDE (GLUCOTROL) 5 mg tablet TAKE 1 TABLET TWICE A DAY    UNIFINE PENTIPS PLUS 31 gauge x 1/4\" ndle FOR USE WITH INSULIN INJECT IONS DAILY    Insulin Needles, Disposable, (PEN NEEDLE) 32 x 5/32 \" ndle 1 each by SubCUTAneous route daily. No current facility-administered medications for this visit. FH:  Mother had lung cancer, also had HTN. Father of  of prostate cancer age 39. His sister has had breast cancer. His family history includes Heart Disease in his paternal grandmother; Hypertension in his mother; and brain aneurysm in his maternal grandfather. His maternal grandmother  of complications of childbirth. SH: He is retired . [employer] . . He reports that he quit smoking about 47 years ago. He has never used smokeless tobacco. He reports that he does not drink alcohol or use illicit drugs. ROS: See above; Complete ROS otherwise negative. OBJECTIVE:   Vitals:   Visit Vitals    /69 (BP 1 Location: Left arm, BP Patient Position: Sitting)    Pulse 83    Temp 98.1 °F (36.7 °C) (Oral)    Resp 16    Ht 5' 7\" (1.702 m)    Wt 281 lb 6.4 oz (127.6 kg)    SpO2 95%    BMI 44.07 kg/m2      Gen: Pleasant, obese 66 y.o. AA male in NAD. HEENT: PERRLA. EOMI. OP moist and pink. TM: Pearly bilaterally. Neck: Supple. No LAD. HEART: RRR, No M/G/R.    LUNGS: CTAB No W/R. ABDOMEN: S, NT, ND, BS+. EXTREMITIES: Warm. No C/C/E.  MUSCULOSKELETAL: Normal ROM, muscle strength 5/5 all groups. NEURO: Alert and oriented x 3. Cranial nerves grossly intact. No focal sensory or motor deficits noted. SKIN: Warm. Dry. No rashes or other lesions noted.     Lab Results   Component Value Date/Time    Sodium 146 05/10/2017 10:42 AM    Potassium 4.7 05/10/2017 10:42 AM    Chloride 104 05/10/2017 10:42 AM    CO2 24 05/10/2017 10:42 AM    Glucose 96 05/10/2017 10:42 AM    BUN 15 05/10/2017 10:42 AM    Creatinine 1.22 05/10/2017 10:42 AM    BUN/Creatinine ratio 12 05/10/2017 10:42 AM    GFR est AA 68 05/10/2017 10:42 AM    GFR est non-AA 58 05/10/2017 10:42 AM    Calcium 9.3 05/10/2017 10:42 AM    Bilirubin, total 0.4 05/10/2017 10:42 AM    ALT (SGPT) 16 05/10/2017 10:42 AM    AST (SGOT) 15 05/10/2017 10:42 AM    Alk. phosphatase 71 05/10/2017 10:42 AM    Protein, total 7.8 05/10/2017 10:42 AM    Albumin 4.4 05/10/2017 10:42 AM    A-G Ratio 1.3 05/10/2017 10:42 AM       Lab Results   Component Value Date/Time    Cholesterol, total 158 01/11/2017 12:00 AM    HDL Cholesterol 56 01/11/2017 12:00 AM    LDL, calculated 84 01/11/2017 12:00 AM    Triglyceride 91 01/11/2017 12:00 AM        Lab Results   Component Value Date/Time    Hemoglobin A1c 8.1 05/10/2017 10:42 AM       Lab Results   Component Value Date/Time    WBC 9.6 08/11/2014 07:50 AM    HGB 12.7 08/11/2014 07:50 AM    HCT 38.7 08/11/2014 07:50 AM    PLATELET 244 51/78/8157 07:50 AM    MCV 85 08/11/2014 07:50 AM         ASSESSMENT/ PLAN:     1. HTN (hypertension): Up and down in the office; typically normal at home. Likely has \"white coat syndrome\". 2. Osteoarthritis: Stable. Inhibits ambulation at times--he has a handicap placard. 3. Mild renal insufficiency: May be developing some CKD due to DM +/- HTN. For now, work on DM control and continue ARB. 4. Diabetes mellitus: Not controlled. As per Dr. Bhakti Murray. 5. Obesity: Diet and exercise. 6. Hearing loss: Progressive. Referred previously to Dr. Joe Cantu. Follow-up Disposition:  Return in about 6 months (around 11/17/2017) for HTN, etc.    I have reviewed the patient's medications and risks/side effects/benefits were discussed. Diagnosis(-es) explained to patient and questions answered. Literature provided where appropriate. This is a Subsequent Medicare Annual Wellness Visit providing Personalized Prevention Plan Services (PPPS) (Performed 12 months after initial AWV and PPPS )    I have reviewed the patient's medical history in detail and updated the computerized patient record.      History     Past Medical History:   Diagnosis Date  Arthritis     knee arthritis    Diabetes mellitus [address] [address]     Hearing loss     Has seen Dr. Barbara Mayer Hypertension     Obesity       Past Surgical History:   Procedure Laterality Date    HX CATARACT REMOVAL  ~     Bilateral    HX ORTHOPAEDIC Left 2013    shoulder    ID COLONOSCOPY FLX DX W/COLLJ SPEC WHEN PFRMD  5/15/2014    Dr. Gates     Current Outpatient Prescriptions   Medication Sig Dispense Refill    ONETOUCH VERIO strip Monitor daily 100 Strip 3    JARDIANCE 10 mg tablet TAKE 1 TABLET DAILY 90 Tab 2    losartan (COZAAR) 50 mg tablet TAKE 1 TABLET TWICE A  Tab 1    BD INSULIN PEN NEEDLE UF MINI 31 gauge x 3/16\" ndle FOR USE WITH INSULIN INJECTIONS DAILY 100 Pen Needle 3    metFORMIN (GLUCOPHAGE) 1,000 mg tablet TAKE 1 TABLET TWICE A DAY WITH MEALS 180 Tab 2    amLODIPine (NORVASC) 10 mg tablet Take 1 Tab by mouth daily. 90 Tab 2    VICTOZA 3-RONI 0.6 mg/0.1 mL (18 mg/3 mL) sub-q pen INJECT 1.8 MG UNDER THE SKIN DAILY 27 mL 2    glipiZIDE (GLUCOTROL) 5 mg tablet TAKE 1 TABLET TWICE A  Tab 2    UNIFINE PENTIPS PLUS 31 gauge x 1/4\" ndle FOR USE WITH INSULIN INJECT IONS DAILY 100 Each 3    Insulin Needles, Disposable, (PEN NEEDLE) 32 x 5/32 \" ndle 1 each by SubCUTAneous route daily.  100 each 3     Allergies   Allergen Reactions    Ace Inhibitors Cough     Family History   Problem Relation Age of Onset    Hypertension Mother     Cancer Mother      lung    Cancer Father 39     prostate    Other Maternal Grandmother       as result of complications of childbirth when baby was 8 mos old   Roselia Bah Cancer Sister      half sister-breast CA in remission    Other Maternal Grandfather      aneurysm    Heart Disease Paternal Grandmother      Social History   Substance Use Topics    Smoking status: Former Smoker     Quit date: 1970    Smokeless tobacco: Never Used    Alcohol use No     Patient Active Problem List   Diagnosis Code    Osteoarthritis M19.90    HTN (hypertension) I10    Diabetes mellitus ([facility] [address] E11.9    Obesity E66.9    Mild renal insufficiency N28.9    History of colon polyps Z86.010    Microalbuminuria R80.9       Depression Risk Factor Screening:     PHQ over the last two weeks 5/17/2017   Little interest or pleasure in doing things Not at all   Feeling down, depressed or hopeless Not at all   Total Score PHQ 2 0     Alcohol Risk Factor Screening: On any occasion during the past 3 months, have you had more than 4 drinks containing alcohol? No    Do you average more than 14 drinks per week? No      Functional Ability and Level of Safety:     Hearing Loss   moderate    Activities of Daily Living   Self-care. Requires assistance with: no ADLs    Fall Risk     Fall Risk Assessment, last 12 mths 5/17/2017   Able to walk? Yes   Fall in past 12 months? No     Abuse Screen   Patient is not abused    Review of Systems   A comprehensive review of systems was negative except for that written in the HPI. Physical Examination     Evaluation of Cognitive Function:  Mood/affect:  neutral  Appearance: age appropriate        Visit Vitals    /69 (BP 1 Location: Left arm, BP Patient Position: Sitting)    Pulse 83    Temp 98.1 °F (36.7 °C) (Oral)    Resp 16    Ht 5' 7\" (1.702 m)    Wt 281 lb 6.4 oz (127.6 kg)    SpO2 95%    BMI 44.07 kg/m2     Gen: Pleasant 66 y.o.  male in NAD.   HEENT: PERRLA. EOMI. OP moist and pink.  Neck: Supple.  No LAD.  HEART: RRR, No M/G/R.   LUNGS: CTAB No W/R.   ABDOMEN: S, NT, ND, BS+.   EXTREMITIES: Warm. No C/C/E. MUSCULOSKELETAL: Normal ROM, muscle strength 5/5 all groups. NEURO: Alert and oriented x 3.  Cranial nerves grossly intact.  No focal sensory or motor deficits noted. SKIN: Warm. Dry. No rashes or other lesions noted.       Patient Care Team:  Machelle Sandhu MD as PCP - General (Internal Medicine)  Horace Rocha MD (Ophthalmology)  Yuliana Cleaning MD (Gastroenterology)  Polo Cavazos MD as Consulting Provider (Endocrinology)  Marci Reyes MD (Orthopedic Surgery)  Dr. Una Lopez (Urology)  Dr. Ernestina Gusman ([address] [place] [address])  Dez Sexton RN as Nurse Belinda Jernigan MD (Podiatry)    Advice/Referrals/Counseling   Education and counseling provided:  Are appropriate based on today's review and evaluation      Assessment/Plan   current treatment plan is effective, no change in therapy.

## 2017-07-12 RX ORDER — AMLODIPINE BESYLATE 10 MG/1
TABLET ORAL
Qty: 90 TAB | Refills: 1 | Status: SHIPPED | OUTPATIENT
Start: 2017-07-12 | End: 2018-01-10 | Stop reason: SDUPTHER

## 2017-09-06 ENCOUNTER — OFFICE VISIT (OUTPATIENT)
Dept: ENDOCRINOLOGY | Age: 72
End: 2017-09-06

## 2017-09-06 VITALS
WEIGHT: 280 LBS | HEART RATE: 83 BPM | HEIGHT: 67 IN | BODY MASS INDEX: 43.95 KG/M2 | SYSTOLIC BLOOD PRESSURE: 136 MMHG | DIASTOLIC BLOOD PRESSURE: 73 MMHG

## 2017-09-06 DIAGNOSIS — E11.8 TYPE 2 DIABETES MELLITUS WITH COMPLICATION, WITHOUT LONG-TERM CURRENT USE OF INSULIN (HCC): Primary | ICD-10-CM

## 2017-09-06 DIAGNOSIS — M21.41 FLAT FEET: ICD-10-CM

## 2017-09-06 DIAGNOSIS — R80.9 MICROALBUMINURIA: ICD-10-CM

## 2017-09-06 DIAGNOSIS — M21.42 FLAT FEET: ICD-10-CM

## 2017-09-06 DIAGNOSIS — I10 ESSENTIAL HYPERTENSION: ICD-10-CM

## 2017-09-06 RX ORDER — PIOGLITAZONEHYDROCHLORIDE 15 MG/1
15 TABLET ORAL DAILY
Qty: 90 TAB | Refills: 3 | Status: SHIPPED | OUTPATIENT
Start: 2017-09-06 | End: 2018-01-10

## 2017-09-06 NOTE — MR AVS SNAPSHOT
Visit Information Date & Time Provider Department Dept. Phone Encounter #  
 9/6/2017  8:30 AM Layne Mora, 1024 [facility] [id number] Follow-up Instructions Return in about 4 months (around 1/6/2018). Your Appointments 11/16/2017  9:00 AM  
ROUTINE CARE with Shahbaz Esquivel MD  
[facility] [address] [facility]) Appt Note: 6 month follow up htn  
 [place] [address] [phone]  
[address] 235 [facility] [address] [place] [address] Upcoming Health Maintenance Date Due INFLUENZA AGE 9 TO ADULT 8/1/2017 HEMOGLOBIN A1C Q6M 11/10/2017 Pneumococcal 65+ Low/Medium Risk (2 of 2 - PPSV23) 11/17/2017 FOOT EXAM Q1 12/1/2017 MICROALBUMIN Q1 1/11/2018 LIPID PANEL Q1 1/11/2018 MEDICARE YEARLY EXAM 5/18/2018 EYE EXAM RETINAL OR DILATED Q1 6/20/2018 COLONOSCOPY 5/15/2019 GLAUCOMA SCREENING Q2Y 6/20/2019 DTaP/Tdap/Td series (2 - Td) 5/28/2025 Allergies as of 9/6/2017  Review Complete On: 9/6/2017 By: Layne Mora MD  
  
 Severity Noted Reaction Type Reactions Ace Inhibitors  04/18/2014   Side Effect Cough Current Immunizations  Reviewed on 5/17/2017 Name Date Influenza Vaccine 11/26/2014 11:24 AM, 9/4/2013 Influenza Vaccine Elenora Gemma) 11/23/2015 Influenza Vaccine (Quad) PF 11/17/2016 Pneumococcal Conjugate (PCV-13) 11/17/2016 Tdap 5/28/2015 Not reviewed this visit You Were Diagnosed With   
  
 Codes Comments Type 2 diabetes mellitus with complication, without long-term current use of insulin (HCC)    -  Primary ICD-10-CM: E11.8 ICD-9-CM: 250.90 Essential hypertension     ICD-10-CM: I10 
ICD-9-CM: 401.9 Microalbuminuria     ICD-10-CM: R80.9 ICD-9-CM: 791.0 BMI 40.0-44.9, adult [facility])     ICD-10-CM: Z68.41 
ICD-9-CM: V85.41 Flat feet     ICD-10-CM: M21.41, M21.42 
ICD-9-CM: 270 Vitals BP Pulse Height(growth percentile) Weight(growth percentile) BMI Smoking Status 136/73 83 5' 7\" (1.702 m) 280 lb (127 kg) 43.85 kg/m2 Former Smoker Vitals History BMI and BSA Data Body Mass Index Body Surface Area  
 43.85 kg/m 2 2.45 m 2 Preferred Pharmacy Pharmacy Name Phone [address] [phone] Your Updated Medication List  
  
   
This list is accurate as of: 9/6/17  9:24 AM.  Always use your most recent med list. amLODIPine 10 mg tablet Commonly known as:  Erenest Virgilio TAKE 1 TABLET DAILY  
  
 glipiZIDE 5 mg tablet Commonly known as:  GLUCOTROL  
TAKE 1 TABLET TWICE A DAY Insulin Needles (Disposable) 32 gauge x 5/32\" Ndle Commonly known as:  PEN NEEDLE  
1 each by SubCUTAneous route daily. * UNIFINE PENTIPS PLUS 31 gauge x 1/4\" Ndle Generic drug:  Insulin Needles (Disposable) FOR USE WITH INSULIN INJECT IONS DAILY * BD INSULIN PEN NEEDLE UF MINI 31 gauge x 3/16\" Ndle Generic drug:  Insulin Needles (Disposable) FOR USE WITH INSULIN INJECTIONS DAILY JARDIANCE 10 mg tablet Generic drug:  empagliflozin TAKE 1 TABLET DAILY losartan 50 mg tablet Commonly known as:  COZAAR  
TAKE 1 TABLET TWICE A DAY  
  
 metFORMIN 1,000 mg tablet Commonly known as:  GLUCOPHAGE  
TAKE 1 TABLET TWICE A DAY WITH MEALS  
  
 ONETOUCH VERIO strip Generic drug:  glucose blood VI test strips Monitor daily  
  
 pioglitazone 15 mg tablet Commonly known as:  ACTOS Take 1 Tab by mouth daily. Replaces glipizide VICTOZA 3-RONI 0.6 mg/0.1 mL (18 mg/3 mL) Pnij Generic drug:  Liraglutide INJECT 1.8 MG UNDER THE SKIN DAILY * Notice: This list has 2 medication(s) that are the same as other medications prescribed for you. Read the directions carefully, and ask your doctor or other care provider to review them with you. Prescriptions Sent to Pharmacy Refills  
 pioglitazone (ACTOS) 15 mg tablet 3 Sig: Take 1 Tab by mouth daily. Replaces glipizide Class: Normal  
 Pharmacy: [address], [address] [place] [address] #: [phone] Route: Oral  
  
We Performed the Following C-PEPTIDE [id number] CPT(R)] FRUCTOSAMINE [76104 CPT(R)] HEMOGLOBIN A1C WITH EAG [66033 CPT(R)]  DIABETES FOOT EXAM [HM7 Custom] METABOLIC PANEL, COMPREHENSIVE [24516 CPT(R)] MICROALBUMIN, UR, RAND W/ MICROALBUMIN/CREA RATIO [id number] CPT(R)] OR COLLECTION VENOUS BLOOD,VENIPUNCTURE Q6370321 CPT(R)] OR HANDLG&/OR CONVEY OF SPEC FOR TR OFFICE TO LAB [94211 CPT(R)] Follow-up Instructions Return in about 4 months (around 1/6/2018). Patient Instructions Diabetes: Work on efforts with diet. Medications: 
Continue metformin Continue Victoza to 1.8 mg daily Continue Jardiance 10 mg  
 
Start pioglitazone and stop glipizide. After 2-4 weeks we should see how it is working Goals for blood sugars: 
Fasting  (less than 150) Other times -  (less than 180). Blood pressure: 
Continue  losartan to 50 mg twice daily Continue amlodipine Kidneys: 
Look stable Blood pressure and glucose control Introducing [facility]! Dear Sid Fournier: Thank you for requesting a [employer] account. Our records indicate that you already have an active [employer] account. You can access your account anytime at [email or web address] Did you know that you can access your hospital and ER discharge instructions at any time in [email or web address]? You can also review all of your test results from your hospital stay or ER visit. Additional Information If you have questions, please visit the Frequently Asked Questions section of the [email or web address] website at [email or web address] Remember, [email or web address] is NOT to be used for urgent needs.  For medical emergencies, dial 911. Now available from your iPhone and Android! Please provide this summary of care documentation to your next provider. Your primary care clinician is listed as [place]. If you have any questions after today's visit, please call [phone].

## 2017-09-06 NOTE — PROGRESS NOTES
History of Present Illness: Nir Cho is a 74 y.o. male presents for follow-up of diabetes. Also has HTN. + microalbuminuria. Has CKD -overall trend has been stable     Current diabetes regimen:  Metformin 1 g BID  Victoza 1.8 mg  Glipizide 2.5 mg BID  Jardiance 10 mg daily. Glucoses:  Yesterday started with glucose of 134. Value was later 213 after cereal. 148 at bedtime last night   153 this AM fasting. Several months ago had some glucoses in 70s-90s for several days. Diet:   Breakfast: cereal or egg, gipson  Lunch: may skip or sandwich  Dinner: cheeseburger  Beverages Water. Weight overall stable. Exercise: limited by arthralgias. HTN: BP controlled   Taking amlodipine and losartan. Kidneys and microalbuminuria - these appear stable     Lipids - controlled without statin. He prefers not to take     Social:lives with wife and teenage grandson [place]      Past Medical History:   Diagnosis Date    Arthritis     knee arthritis    Diabetes mellitus ([facility] [address] [facility]     Hearing loss     Has seen Dr. Mike Das Hypertension     Obesity      Current Outpatient Prescriptions   Medication Sig    amLODIPine (NORVASC) 10 mg tablet TAKE 1 TABLET DAILY    metFORMIN (GLUCOPHAGE) 1,000 mg tablet TAKE 1 TABLET TWICE A DAY WITH MEALS    ONETOUCH VERIO strip Monitor daily    JARDIANCE 10 mg tablet TAKE 1 TABLET DAILY    losartan (COZAAR) 50 mg tablet TAKE 1 TABLET TWICE A DAY    BD INSULIN PEN NEEDLE UF MINI 31 gauge x 3/16\" ndle FOR USE WITH INSULIN INJECTIONS DAILY    VICTOZA 3-RONI 0.6 mg/0.1 mL (18 mg/3 mL) sub-q pen INJECT 1.8 MG UNDER THE SKIN DAILY    glipiZIDE (GLUCOTROL) 5 mg tablet TAKE 1 TABLET TWICE A DAY    UNIFINE PENTIPS PLUS 31 gauge x 1/4\" ndle FOR USE WITH INSULIN INJECT IONS DAILY    Insulin Needles, Disposable, (PEN NEEDLE) 32 x 5/32 \" ndle 1 each by SubCUTAneous route daily. No current facility-administered medications for this visit.       Allergies Allergen Reactions    Ace Inhibitors Cough       Review of Systems:  - Eyes: no blurry vision or double vision  - Cardiovascular: no chest pain  - Respiratory: no shortness of breath  - Musculoskeletal: see hPI  - Neurological: no numbness/tingling in extremities    Physical Examination:  Visit Vitals    /73    Pulse 83    Ht 5' 7\" (1.702 m)    Wt 280 lb (127 kg)    BMI 43.85 kg/m2   -   - General: pleasant, no distress, normal gait   HEENT: hearing intact, EOMI, clear sclera without icterus  - Cardiovascular: regular, normal rate   - Respiratory: normal effort  - Integumentary: no edema   Diabetic foot exam:       Right: Filament test normal sensation with micro filament   Pulse DP: 2+ (normal)   Deformities: Yes - flat feet  For diabetic shoes/orthotics. Required documentation: I am treating Mr Margarita King  under a comprehensive plan of care for diabetes. The patient would benefit from diabetic footwear (including orthotics) to protect his feet. He has rather pronounced flat feet and is at risk for callus formation without proper footwear      - Psychiatric: normal mood and affect    Data Reviewed:   Component      Latest Ref Rng & Units 5/10/2017 1/11/2017 1/11/2017 1/11/2017          10:42 AM 12:00 AM 12:00 AM 12:00 AM   Cholesterol, total      100 - 199 mg/dL   158    Triglyceride      0 - 149 mg/dL   91    HDL Cholesterol      >39 mg/dL   56    VLDL, calculated      5 - 40 mg/dL   18    LDL, calculated      0 - 99 mg/dL   84    Creatinine, urine      Not Estab. mg/dL  82.3     Microalbumin, urine      Not Estab. ug/mL  70.7     Microalbumin/Creat. Ratio      0.0 - 30.0 mg/g creat  85.9 (H)     Hemoglobin A1c, (calculated)      4.8 - 5.6 % 8.1 (H)   7.7 (H)   Estimated average glucose      mg/dL 186   174       Assessment/Plan:   1.  Type 2 diabetes mellitus with complication, without long-term current use of insulin ([address]   Not controlled  Will see how he responds to change from glipizide to pioglitazone. Feel this may better help  His baseline insulin needs and importantly would help him avoid hypogylycemia. Reviewed risk for edema  He will update me in 2 weeks  - continue Victoza, Jardiance, metformin  Encouraged a lower carb, higher protein breakfast.     2. Essential hypertension   - continue current medications   3. Microalbuminuria   - following. BP control, glucose control and wt loss. Victoza and Jardiance likely have renal benefits   4. BMI 40.0-44.9, adult ([address]    5. Flat feet   - continue proper footwear and orthotics     Patient Instructions   Diabetes: Work on efforts with diet. Medications:  Continue metformin  Continue Victoza to 1.8 mg daily  Continue Jardiance 10 mg     Start pioglitazone and stop glipizide. After 2-4 weeks we should see how it is working     Goals for blood sugars:  Fasting  (less than 150)  Other times -  (less than 180). Blood pressure:  Continue  losartan to 50 mg twice daily  Continue amlodipine    Kidneys:  Look stable  Blood pressure and glucose control      Follow-up Disposition:  Return in about 4 months (around 1/6/2018).     Copy sent to:

## 2017-09-06 NOTE — PATIENT INSTRUCTIONS
Diabetes: Work on efforts with diet. Medications:  Continue metformin  Continue Victoza to 1.8 mg daily  Continue Jardiance 10 mg     Start pioglitazone and stop glipizide. After 2-4 weeks we should see how it is working     Goals for blood sugars:  Fasting  (less than 150)  Other times -  (less than 180).     Blood pressure:  Continue  losartan to 50 mg twice daily  Continue amlodipine    Kidneys:  Look stable  Blood pressure and glucose control

## 2017-09-07 LAB
ALBUMIN SERPL-MCNC: 4.6 G/DL (ref 3.5–4.8)
ALBUMIN/CREAT UR: 86.1 MG/G CREAT (ref 0–30)
ALBUMIN/GLOB SERPL: 1.4 {RATIO} (ref 1.2–2.2)
ALP SERPL-CCNC: 73 IU/L (ref 39–117)
ALT SERPL-CCNC: 24 IU/L (ref 0–44)
AST SERPL-CCNC: 19 IU/L (ref 0–40)
BILIRUB SERPL-MCNC: 0.5 MG/DL (ref 0–1.2)
BUN SERPL-MCNC: 20 MG/DL (ref 8–27)
BUN/CREAT SERPL: 16 (ref 10–24)
C PEPTIDE SERPL-MCNC: 3.4 NG/ML (ref 1.1–4.4)
CALCIUM SERPL-MCNC: 9.5 MG/DL (ref 8.6–10.2)
CHLORIDE SERPL-SCNC: 104 MMOL/L (ref 96–106)
CO2 SERPL-SCNC: 27 MMOL/L (ref 18–29)
CREAT SERPL-MCNC: 1.24 MG/DL (ref 0.76–1.27)
CREAT UR-MCNC: 95.9 MG/DL
EST. AVERAGE GLUCOSE BLD GHB EST-MCNC: 174 MG/DL
FRUCTOSAMINE SERPL-SCNC: 333 UMOL/L (ref 0–285)
GLOBULIN SER CALC-MCNC: 3.4 G/DL (ref 1.5–4.5)
GLUCOSE SERPL-MCNC: 105 MG/DL (ref 65–99)
HBA1C MFR BLD: 7.7 % (ref 4.8–5.6)
INTERPRETATION: NORMAL
Lab: NORMAL
MICROALBUMIN UR-MCNC: 82.6 UG/ML
POTASSIUM SERPL-SCNC: 4.3 MMOL/L (ref 3.5–5.2)
PROT SERPL-MCNC: 8 G/DL (ref 6–8.5)
SODIUM SERPL-SCNC: 145 MMOL/L (ref 134–144)

## 2017-09-08 RX ORDER — GLIPIZIDE 5 MG/1
5 TABLET ORAL 2 TIMES DAILY
Qty: 180 TAB | Refills: 3 | Status: SHIPPED | OUTPATIENT
Start: 2017-09-08 | End: 2019-03-08 | Stop reason: SDUPTHER

## 2017-10-16 ENCOUNTER — PATIENT MESSAGE (OUTPATIENT)
Dept: ENDOCRINOLOGY | Age: 72
End: 2017-10-16

## 2017-10-16 NOTE — TELEPHONE ENCOUNTER
From: Ortiz Sierra  To: Dakota Steel MD  Sent: 10/16/2017 2:23 PM EDT  Subject: Prescription Question    Dr. Jeremías Oconnell,    My appointment with the [facility] to get my diabetic shoes and inserts is on Thursday, October 19th. They will not see me without a prescription from you.  The prescription can be faxed to: [facility]: [address]   [address] [facility]   FAX [phone]    Thanks,  Sayra Matson

## 2017-11-16 ENCOUNTER — OFFICE VISIT (OUTPATIENT)
Dept: INTERNAL MEDICINE CLINIC | Age: 72
End: 2017-11-16

## 2017-11-16 VITALS
OXYGEN SATURATION: 95 % | HEIGHT: 67 IN | DIASTOLIC BLOOD PRESSURE: 70 MMHG | WEIGHT: 282.6 LBS | SYSTOLIC BLOOD PRESSURE: 122 MMHG | TEMPERATURE: 98 F | BODY MASS INDEX: 44.36 KG/M2 | RESPIRATION RATE: 16 BRPM | HEART RATE: 79 BPM

## 2017-11-16 DIAGNOSIS — Z23 ENCOUNTER FOR IMMUNIZATION: Primary | ICD-10-CM

## 2017-11-16 NOTE — MR AVS SNAPSHOT
Visit Information Date & Time Provider Department Dept. Phone Encounter #  
 11/16/2017  9:00 AM Sheila Devine, [address] [place] [address],[address] [phone] [id number] Follow-up Instructions Return in about 6 months (around 5/16/2018) for HTN. Your Appointments 1/10/2018  9:50 AM  
Follow Up with Anna Skelton MD  
[facility] 365[address] [facility]) Appt Note: f/u         dm         4 mon; f/u diabetes cp0.00  
 [address] [facility] [address] [id number] 570 [facility] Upcoming Health Maintenance Date Due Influenza Age 5 to Adult 8/1/2017 Pneumococcal 65+ Low/Medium Risk (2 of 2 - PPSV23) 11/17/2017 LIPID PANEL Q1 1/11/2018 HEMOGLOBIN A1C Q6M 3/6/2018 MEDICARE YEARLY EXAM 5/18/2018 EYE EXAM RETINAL OR DILATED Q1 6/20/2018 FOOT EXAM Q1 9/6/2018 MICROALBUMIN Q1 9/6/2018 COLONOSCOPY 5/15/2019 GLAUCOMA SCREENING Q2Y 6/20/2019 DTaP/Tdap/Td series (2 - Td) 5/28/2025 Allergies as of 11/16/2017  Review Complete On: 11/16/2017 By: Sheila Devine MD  
  
 Severity Noted Reaction Type Reactions Ace Inhibitors  04/18/2014   Side Effect Cough Current Immunizations  Reviewed on 5/17/2017 Name Date Influenza Vaccine 11/26/2014 11:24 AM, 9/4/2013 Influenza Vaccine Elda Yina) 11/23/2015 Influenza Vaccine (Quad) PF  Incomplete, 11/17/2016 Pneumococcal Conjugate (PCV-13) 11/17/2016 Tdap 5/28/2015 Not reviewed this visit You Were Diagnosed With   
  
 Codes Comments Encounter for immunization    -  Primary ICD-10-CM: L38 ICD-9-CM: V03.89 Vitals BP Pulse Temp Resp Height(growth percentile) Weight(growth percentile) 122/70 (BP 1 Location: Left arm, BP Patient Position: Sitting) 79 98 °F (36.7 °C) (Oral) 16 5' 7\" (1.702 m) 282 lb 9.6 oz (128.2 kg) SpO2 BMI Smoking Status 95% 44.26 kg/m2 Former Smoker Vitals History BMI and BSA Data Body Mass Index Body Surface Area  
 44.26 kg/m 2 2.46 m 2 Preferred Pharmacy Pharmacy Name Phone [facility] [phone], [address] AT [place] [address] [phone] Your Updated Medication List  
  
   
This list is accurate as of: 11/16/17  9:39 AM.  Always use your most recent med list. amLODIPine 10 mg tablet Commonly known as:  Tad Karyn TAKE 1 TABLET DAILY  
  
 glipiZIDE 5 mg tablet Commonly known as:  Celeste Kay Take 1 Tab by mouth two (2) times a day. Insulin Needles (Disposable) 32 gauge x 5/32\" Ndle Commonly known as:  PEN NEEDLE  
1 each by SubCUTAneous route daily. * UNIFINE PENTIPS PLUS 31 gauge x 1/4\" Ndle Generic drug:  Insulin Needles (Disposable) FOR USE WITH INSULIN INJECT IONS DAILY * BD INSULIN PEN NEEDLE UF MINI 31 gauge x 3/16\" Ndle Generic drug:  Insulin Needles (Disposable) FOR USE WITH INSULIN INJECTIONS DAILY JARDIANCE 10 mg tablet Generic drug:  empagliflozin TAKE 1 TABLET DAILY losartan 50 mg tablet Commonly known as:  COZAAR  
TAKE 1 TABLET TWICE A DAY  
  
 metFORMIN 1,000 mg tablet Commonly known as:  GLUCOPHAGE  
TAKE 1 TABLET TWICE A DAY WITH MEALS  
  
 miscellaneous medical supply Misc Order for diabetic shoes and inserts. ONETOUCH VERIO strip Generic drug:  glucose blood VI test strips Monitor daily  
  
 pioglitazone 15 mg tablet Commonly known as:  ACTOS Take 1 Tab by mouth daily. Replaces glipizide VICTOZA 3-RONI 0.6 mg/0.1 mL (18 mg/3 mL) Pnij Generic drug:  Liraglutide INJECT 1.8 MG UNDER THE SKIN DAILY * Notice: This list has 2 medication(s) that are the same as other medications prescribed for you. Read the directions carefully, and ask your doctor or other care provider to review them with you. We Performed the Following ADMIN INFLUENZA VIRUS VAC [ [facility]] INFLUENZA VIRUS VAC QUAD,SPLIT,PRESV FREE SYRINGE IM [id number] CPT(R)] Follow-up Instructions Return in about 6 months (around 5/16/2018) for HTN. Patient Instructions Body Mass Index: Care Instructions Your Care Instructions Body mass index (BMI) can help you see if your weight is raising your risk for health problems. It uses a formula to compare how much you weigh with how tall you are. · A BMI lower than 18.5 is considered underweight. · A BMI between 18.5 and 24.9 is considered healthy. · A BMI between 25 and 29.9 is considered overweight. A BMI of 30 or higher is considered obese. If your BMI is in the normal range, it means that you have a lower risk for weight-related health problems. If your BMI is in the overweight or obese range, you may be at increased risk for weight-related health problems, such as high blood pressure, heart disease, stroke, arthritis or joint pain, and diabetes. If your BMI is in the underweight range, you may be at increased risk for health problems such as fatigue, lower protection (immunity) against illness, muscle loss, bone loss, hair loss, and hormone problems. BMI is just one measure of your risk for weight-related health problems. You may be at higher risk for health problems if you are not active, you eat an unhealthy diet, or you drink too much alcohol or use tobacco products. Follow-up care is a key part of your treatment and safety. Be sure to make and go to all appointments, and call your doctor if you are having problems. It's also a good idea to know your test results and keep a list of the medicines you take. How can you care for yourself at home? · Practice healthy eating habits. This includes eating plenty of fruits, vegetables, whole grains, lean protein, and low-fat dairy. · If your doctor recommends it, get more exercise.  Walking is a good choice. Bit by bit, increase the amount you walk every day. Try for at least 30 minutes on most days of the week. · Do not smoke. Smoking can increase your risk for health problems. If you need help quitting, talk to your doctor about stop-smoking programs and medicines. These can increase your chances of quitting for good. · Limit alcohol to 2 drinks a day for men and 1 drink a day for women. Too much alcohol can cause health problems. If you have a BMI higher than 25 · Your doctor may do other tests to check your risk for weight-related health problems. This may include measuring the distance around your waist. A waist measurement of more than 40 inches in men or 35 inches in women can increase the risk of weight-related health problems. · Talk with your doctor about steps you can take to stay healthy or improve your health. You may need to make lifestyle changes to lose weight and stay healthy, such as changing your diet and getting regular exercise. If you have a BMI lower than 18.5 · Your doctor may do other tests to check your risk for health problems. · Talk with your doctor about steps you can take to stay healthy or improve your health. You may need to make lifestyle changes to gain or maintain weight and stay healthy, such as getting more healthy foods in your diet and doing exercises to build muscle. Where can you learn more? Go to http://milka-pilar.info/. Enter S176 in the search box to learn more about \"Body Mass Index: Care Instructions. \" Current as of: October 13, 2016 Content Version: 11.4 © 7510-8205 Healthwise, [employer]. Care instructions adapted under license by [employer] (which disclaims liability or warranty for this information). If you have questions about a medical condition or this instruction, always ask your healthcare professional. [place] [address] any warranty or liability for your use of this information. Introducing [facility]! Dear Genetta Burkitt: Thank you for requesting a [employer] account. Our records indicate that you already have an active [employer] account. You can access your account anytime at [email or web address] Did you know that you can access your hospital and ER discharge instructions at any time in [employer]? You can also review all of your test results from your hospital stay or ER visit. Additional Information If you have questions, please visit the Frequently Asked Questions section of the [employer] website at [email or web address] Remember, [employer] is NOT to be used for urgent needs. For medical emergencies, dial 911. Now available from your iPhone and Android! Please provide this summary of care documentation to your next provider. Your primary care clinician is listed as [place]. If you have any questions after today's visit, please call [phone].

## 2017-11-16 NOTE — PROGRESS NOTES
SUBJECTIVE:   Mr. Cullen Kumar is a 74 y.o. male who is here for follow up of routine medical issues. Previously he saw Dr. Abdullahi Ospina. Chief Complaint   Patient presents with    Hypertension     pt here today for 6 month f.u    Immunization/Injection     pt wants a flu shot       He has been diabetic since about 2005. He sees Dr. Tabatha Felix as well. His BP at home is running 275C-222S systolic. High here today. Knee pain \"every now and then; doing fine for the most part. \"  L knee. Also stiffness in AM for a little while, a few minutes (less than 15)--back, hips. \"Gets better once I get moving around. \"   At this time, he is otherwise doing well and has brought no other complaints to my attention today. For a list of the medical issues addressed today, see the assessment and plan below. PMH:   Past Medical History:   Diagnosis Date    Arthritis     knee arthritis    Diabetes mellitus ([facility] [address]     Hearing loss     Has seen Dr. Audrey Mahoney Hypertension     Obesity        Past Surgical History:   Procedure Laterality Date    HX CATARACT REMOVAL  ~ 2009    Bilateral    HX ORTHOPAEDIC Left 12/13/2013    shoulder    CO COLONOSCOPY FLX DX W/COLLJ SPEC WHEN PFRMD  5/15/2014    Dr. Lina Huffman       All: He is allergic to ace inhibitors. Current Outpatient Prescriptions   Medication Sig    miscellaneous medical supply misc Order for diabetic shoes and inserts.  losartan (COZAAR) 50 mg tablet TAKE 1 TABLET TWICE A DAY    glipiZIDE (GLUCOTROL) 5 mg tablet Take 1 Tab by mouth two (2) times a day.     VICTOZA 3-RONI 0.6 mg/0.1 mL (18 mg/3 mL) pnij INJECT 1.8 MG UNDER THE SKIN DAILY    amLODIPine (NORVASC) 10 mg tablet TAKE 1 TABLET DAILY    metFORMIN (GLUCOPHAGE) 1,000 mg tablet TAKE 1 TABLET TWICE A DAY WITH MEALS    ONETOUCH VERIO strip Monitor daily    JARDIANCE 10 mg tablet TAKE 1 TABLET DAILY    BD INSULIN PEN NEEDLE UF MINI 31 gauge x 3/16\" ndle FOR USE WITH INSULIN INJECTIONS DAILY    UNIFINE PENTIPS PLUS 31 gauge x 1/4\" ndle FOR USE WITH INSULIN INJECT IONS DAILY    Insulin Needles, Disposable, (PEN NEEDLE) 32 x 5/32 \" ndle 1 each by SubCUTAneous route daily.  pioglitazone (ACTOS) 15 mg tablet Take 1 Tab by mouth daily. Replaces glipizide     No current facility-administered medications for this visit. FH:  Mother had lung cancer, also had HTN. Father of  of prostate cancer age 39. His sister has had breast cancer. His family history includes Heart Disease in his paternal grandmother; Hypertension in his mother; and brain aneurysm in his maternal grandfather. His maternal grandmother  of complications of childbirth. SH: He is retired . [employer] . . He reports that he quit smoking about 47 years ago. He has never used smokeless tobacco. He reports that he does not drink alcohol or use illicit drugs. ROS: See above; Complete ROS otherwise negative. OBJECTIVE:   Vitals:   Visit Vitals    /70 (BP 1 Location: Left arm, BP Patient Position: Sitting)    Pulse 79    Temp 98 °F (36.7 °C) (Oral)    Resp 16    Ht 5' 7\" (1.702 m)    Wt 282 lb 9.6 oz (128.2 kg)    SpO2 95%    BMI 44.26 kg/m2      Gen: Pleasant, obese 74 y.o. AA male in NAD. HEENT: PERRLA. EOMI. OP moist and pink. TM: Pearly bilaterally. Neck: Supple. No LAD. HEART: RRR, No M/G/R.    LUNGS: CTAB No W/R. ABDOMEN: S, NT, ND, BS+. EXTREMITIES: Warm. No C/C/E.  MUSCULOSKELETAL: Normal ROM, muscle strength 5/5 all groups. NEURO: Alert and oriented x 3. Cranial nerves grossly intact. No focal sensory or motor deficits noted. SKIN: Warm. Dry. No rashes or other lesions noted.     Lab Results   Component Value Date/Time    Sodium 145 2017 09:25 AM    Potassium 4.3 2017 09:25 AM    Chloride 104 2017 09:25 AM    CO2 27 2017 09:25 AM    Glucose 105 2017 09:25 AM    BUN 20 2017 09:25 AM    Creatinine 1.24 2017 09:25 AM    BUN/Creatinine ratio 16 09/06/2017 09:25 AM    GFR est AA 66 09/06/2017 09:25 AM    GFR est non-AA 57 09/06/2017 09:25 AM    Calcium 9.5 09/06/2017 09:25 AM    Bilirubin, total 0.5 09/06/2017 09:25 AM    ALT (SGPT) 24 09/06/2017 09:25 AM    AST (SGOT) 19 09/06/2017 09:25 AM    Alk. phosphatase 73 09/06/2017 09:25 AM    Protein, total 8.0 09/06/2017 09:25 AM    Albumin 4.6 09/06/2017 09:25 AM    A-G Ratio 1.4 09/06/2017 09:25 AM       Lab Results   Component Value Date/Time    Cholesterol, total 158 01/11/2017 12:00 AM    HDL Cholesterol 56 01/11/2017 12:00 AM    LDL, calculated 84 01/11/2017 12:00 AM    Triglyceride 91 01/11/2017 12:00 AM        Lab Results   Component Value Date/Time    Hemoglobin A1c 7.7 09/06/2017 09:25 AM       Lab Results   Component Value Date/Time    WBC 9.6 08/11/2014 07:50 AM    HGB 12.7 08/11/2014 07:50 AM    HCT 38.7 08/11/2014 07:50 AM    PLATELET 754 83/32/5282 07:50 AM    MCV 85 08/11/2014 07:50 AM         ASSESSMENT/ PLAN:     1. HTN (hypertension): Up and down in the office; typically normal at home. Likely has \"white coat syndrome\". 2. Osteoarthritis: Stable. Inhibits ambulation at times--he has a handicap placard. 3. Mild renal insufficiency: May be developing some CKD due to DM +/- HTN. For now, work on DM control and continue ARB. 4. Diabetes mellitus: Not controlled. As per Dr. Ami Conroy. 5. Obesity: Diet and exercise. 6. Hearing loss: Progressive. Referred previously to Dr. Jada Soto. Follow-up Disposition:  Return in about 6 months (around 5/16/2018) for HTN. I have reviewed the patient's medications and risks/side effects/benefits were discussed. Diagnosis(-es) explained to patient and questions answered. Literature provided where appropriate. Discussed the patient's BMI with him.   The BMI follow up plan is as follows:     dietary management education, guidance, and counseling  encourage exercise  monitor weight  prescribed dietary intake    An After Visit Summary was printed and given to the patient.

## 2017-11-16 NOTE — PROGRESS NOTES
1. Have you been to the ER, urgent care clinic since your last visit? Hospitalized since your last visit?no    2. Have you seen or consulted any other health care providers outside of the [address] [place] [address] since your last visit? Include any pap smears or colon screening.  no

## 2017-11-16 NOTE — PATIENT INSTRUCTIONS
Body Mass Index: Care Instructions  Your Care Instructions    Body mass index (BMI) can help you see if your weight is raising your risk for health problems. It uses a formula to compare how much you weigh with how tall you are. · A BMI lower than 18.5 is considered underweight. · A BMI between 18.5 and 24.9 is considered healthy. · A BMI between 25 and 29.9 is considered overweight. A BMI of 30 or higher is considered obese. If your BMI is in the normal range, it means that you have a lower risk for weight-related health problems. If your BMI is in the overweight or obese range, you may be at increased risk for weight-related health problems, such as high blood pressure, heart disease, stroke, arthritis or joint pain, and diabetes. If your BMI is in the underweight range, you may be at increased risk for health problems such as fatigue, lower protection (immunity) against illness, muscle loss, bone loss, hair loss, and hormone problems. BMI is just one measure of your risk for weight-related health problems. You may be at higher risk for health problems if you are not active, you eat an unhealthy diet, or you drink too much alcohol or use tobacco products. Follow-up care is a key part of your treatment and safety. Be sure to make and go to all appointments, and call your doctor if you are having problems. It's also a good idea to know your test results and keep a list of the medicines you take. How can you care for yourself at home? · Practice healthy eating habits. This includes eating plenty of fruits, vegetables, whole grains, lean protein, and low-fat dairy. · If your doctor recommends it, get more exercise. Walking is a good choice. Bit by bit, increase the amount you walk every day. Try for at least 30 minutes on most days of the week. · Do not smoke. Smoking can increase your risk for health problems. If you need help quitting, talk to your doctor about stop-smoking programs and medicines. These can increase your chances of quitting for good. · Limit alcohol to 2 drinks a day for men and 1 drink a day for women. Too much alcohol can cause health problems. If you have a BMI higher than 25  · Your doctor may do other tests to check your risk for weight-related health problems. This may include measuring the distance around your waist. A waist measurement of more than 40 inches in men or 35 inches in women can increase the risk of weight-related health problems. · Talk with your doctor about steps you can take to stay healthy or improve your health. You may need to make lifestyle changes to lose weight and stay healthy, such as changing your diet and getting regular exercise. If you have a BMI lower than 18.5  · Your doctor may do other tests to check your risk for health problems. · Talk with your doctor about steps you can take to stay healthy or improve your health. You may need to make lifestyle changes to gain or maintain weight and stay healthy, such as getting more healthy foods in your diet and doing exercises to build muscle. Where can you learn more? Go to http://milka-pilar.info/. Enter S176 in the search box to learn more about \"Body Mass Index: Care Instructions. \"  Current as of: October 13, 2016  Content Version: 11.4  © 7341-8352 Healthwise, Incorporated. Care instructions adapted under license by [employer] (which disclaims liability or warranty for this information). If you have questions about a medical condition or this instruction, always ask your healthcare professional. [address] any warranty or liability for your use of this information.

## 2018-01-10 ENCOUNTER — OFFICE VISIT (OUTPATIENT)
Dept: ENDOCRINOLOGY | Age: 73
End: 2018-01-10

## 2018-01-10 VITALS
HEIGHT: 67 IN | HEART RATE: 73 BPM | BODY MASS INDEX: 44.1 KG/M2 | SYSTOLIC BLOOD PRESSURE: 146 MMHG | DIASTOLIC BLOOD PRESSURE: 86 MMHG | WEIGHT: 281 LBS

## 2018-01-10 DIAGNOSIS — I10 ESSENTIAL HYPERTENSION: ICD-10-CM

## 2018-01-10 DIAGNOSIS — E11.21 TYPE II DIABETES MELLITUS WITH NEPHROPATHY (HCC): Primary | ICD-10-CM

## 2018-01-10 RX ORDER — EMPAGLIFLOZIN 10 MG/1
TABLET, FILM COATED ORAL
Qty: 90 TAB | Refills: 2 | Status: SHIPPED | OUTPATIENT
Start: 2018-01-10 | End: 2018-07-25 | Stop reason: DRUGHIGH

## 2018-01-10 NOTE — MR AVS SNAPSHOT
Visit Information Date & Time Provider Department Dept. Phone Encounter #  
 1/10/2018  9:50 AM Elva Torres MD [facility] [id number] Follow-up Instructions Return in about 3 months (around 4/10/2018). Your Appointments 5/16/2018  9:00 AM  
ROUTINE CARE with MD CHARLI Hsu [facility]) Appt Note: 6 mon [facility] [phone]  
[address] [facility] [address] [place] [address] Upcoming Health Maintenance Date Due Pneumococcal 65+ Low/Medium Risk (2 of 2 - PPSV23) 11/17/2017 LIPID PANEL Q1 1/11/2018 HEMOGLOBIN A1C Q6M 3/6/2018 MEDICARE YEARLY EXAM 5/18/2018 EYE EXAM RETINAL OR DILATED Q1 6/20/2018 FOOT EXAM Q1 9/6/2018 MICROALBUMIN Q1 9/6/2018 COLONOSCOPY 5/15/2019 GLAUCOMA SCREENING Q2Y 6/20/2019 DTaP/Tdap/Td series (2 - Td) 5/28/2025 Allergies as of 1/10/2018  Review Complete On: 1/10/2018 By: Elva Torres MD  
  
 Severity Noted Reaction Type Reactions Ace Inhibitors  04/18/2014   Side Effect Cough Current Immunizations  Reviewed on 5/17/2017 Name Date Influenza Vaccine 11/26/2014 11:24 AM, 9/4/2013 Influenza Vaccine [facility]) 11/23/2015 Influenza Vaccine (Quad) PF 11/16/2017  9:40 AM, 11/17/2016 Pneumococcal Conjugate (PCV-13) 11/17/2016 Tdap 5/28/2015 Not reviewed this visit You Were Diagnosed With   
  
 Codes Comments Type II diabetes mellitus with nephropathy ([facility] [email or web address]    -  Primary ICD-10-CM: E11.21 
ICD-9-CM: 250.40, 583.81 Essential hypertension     ICD-10-CM: I10 
ICD-9-CM: 401.9 BMI 40.0-44.9, adult [facility])     ICD-10-CM: Z68.41 
ICD-9-CM: V85.41 Vitals BP Pulse Height(growth percentile) Weight(growth percentile) BMI Smoking Status 146/86 73 5' 7\" (1.702 m) 281 lb (127.5 kg) 44.01 kg/m2 Former Smoker Vitals History BMI and BSA Data Body Mass Index Body Surface Area 44.01 kg/m 2 2.46 m 2 Preferred Pharmacy Pharmacy Name Phone [phone], [address] AT [place] [address] [phone] Your Updated Medication List  
  
   
This list is accurate as of: 1/10/18 11:04 AM.  Always use your most recent med list. amLODIPine 10 mg tablet Commonly known as:  Saintclair Rickers TAKE 1 TABLET DAILY  
  
 glipiZIDE 5 mg tablet Commonly known as:  Alexis Minor Take 1 Tab by mouth two (2) times a day. Insulin Needles (Disposable) 32 gauge x 5/32\" Ndle Commonly known as:  PEN NEEDLE  
1 each by SubCUTAneous route daily. * UNIFINE PENTIPS PLUS 31 gauge x 1/4\" Ndle Generic drug:  Insulin Needles (Disposable) FOR USE WITH INSULIN INJECT IONS DAILY * BD INSULIN PEN NEEDLE UF MINI 31 gauge x 3/16\" Ndle Generic drug:  Insulin Needles (Disposable) FOR USE WITH INSULIN INJECTIONS DAILY JARDIANCE 10 mg tablet Generic drug:  empagliflozin TAKE 1 TABLET DAILY losartan 50 mg tablet Commonly known as:  COZAAR  
TAKE 1 TABLET TWICE A DAY  
  
 metFORMIN 1,000 mg tablet Commonly known as:  GLUCOPHAGE  
TAKE 1 TABLET TWICE A DAY WITH MEALS  
  
 miscellaneous medical supply Misc Order for diabetic shoes and inserts. ONETOUCH VERIO strip Generic drug:  glucose blood VI test strips Monitor daily VICTOZA 3-RONI 0.6 mg/0.1 mL (18 mg/3 mL) Pnij Generic drug:  Liraglutide INJECT 1.8 MG UNDER THE SKIN DAILY * Notice: This list has 2 medication(s) that are the same as other medications prescribed for you. Read the directions carefully, and ask your doctor or other care provider to review them with you. We Performed the Following HEMOGLOBIN A1C WITH EAG [11318 CPT(R)] LIPID PANEL [15583 CPT(R)] METABOLIC PANEL, BASIC [08161 CPT(R)] MICROALBUMIN, UR, RAND W/ MICROALBUMIN/CREA RATIO [id number] CPT(R)] FL COLLECTION VENOUS BLOOD,VENIPUNCTURE [id number] CPT(R)] FL HANDLG&/OR CONVEY OF SPEC FOR TR OFFICE TO LAB [76514 CPT(R)] Follow-up Instructions Return in about 3 months (around 4/10/2018). Patient Instructions Diabetes: 
 
Decrease intake of juices and carbohydrates. Medications: 
Continue metformin Continue Victoza to 1.8 mg daily Continue Jardiance 10 mg Increase glipizide to 5 mg twice daily. Lower dose back down if you have values < 90 
 
Goals for blood sugars: 
Fasting  (less than 150) Other times -  (less than 180). Blood pressure: 
Continue  losartan to 50 mg twice daily Continue amlodipine Kidneys: 
Look stable Blood pressure and glucose control Introducing [facility] SERVICES! Dear Yamila Clement: Thank you for requesting a [facility] account. Our records indicate that you already have an active [employer] account. You can access your account anytime at [email or web address] Did you know that you can access your hospital and ER discharge instructions at any time in [employer]? You can also review all of your test results from your hospital stay or ER visit. Additional Information If you have questions, please visit the Frequently Asked Questions section of the [employer] website at [email or web address] Remember, [employer] is NOT to be used for urgent needs. For medical emergencies, dial 911. Now available from your iPhone and Android! Please provide this summary of care documentation to your next provider. Your primary care clinician is listed as [place]. If you have any questions after today's visit, please call [phone].

## 2018-01-10 NOTE — PROGRESS NOTES
Microalbumin, foot exam, eye exam up to date. Patient reports blood sugar was 149 at 7:30am this morning, this is higher than usual. Complains of headache today and overall not feeling himself. No recent labs.

## 2018-01-10 NOTE — PROGRESS NOTES
History of Present Illness: Amber Marino is a 74 y.o. male presents for follow-up of diabetes  Also has HTN. + microalbuminuria. Has CKD -overall trend has been stable     Has headache this AM. Has some pressure on eyes. Feels he is urinating more. Awakens during night. Wife says he does not snore. Does not get 8 hours of sleep. Current diabetes regimen:  Metformin 1 g BID  Victoza 1.8 mg  Glipizide 2.5 mg BID  Jardiance 10 mg daily. - does occasionally forget to take medications    Glucoses: Forgot log and meter  149 this AM fasting  Lowest values 120.  220 would be highest - non-fasting at bedtime. Diet: Reviewed  Breakfast: cereal or egg, gipson  Lunch: may skip or sandwich  Dinner: cheeseburger  Beverages Water. occ juice    Weight overall stable. Exercise: uses bike 2-3 times a week. HTN: BP higher. Taking amlodipine and losartan. Kidneys and microalbuminuria - these appear stable     Lipids - controlled without statin. He prefers not to take     Social:lives with wife and teenage grandson [place]      Past Medical History:   Diagnosis Date    Arthritis     knee arthritis    Diabetes mellitus ([facility] [address]     Hearing loss     Has seen Dr. Sarah Garcia Hypertension     Obesity      Current Outpatient Prescriptions   Medication Sig    miscellaneous medical supply misc Order for diabetic shoes and inserts.  losartan (COZAAR) 50 mg tablet TAKE 1 TABLET TWICE A DAY    glipiZIDE (GLUCOTROL) 5 mg tablet Take 1 Tab by mouth two (2) times a day.  (Patient taking differently: Take 2.5 mg by mouth two (2) times a day.)    VICTOZA 3-RONI 0.6 mg/0.1 mL (18 mg/3 mL) pnij INJECT 1.8 MG UNDER THE SKIN DAILY    amLODIPine (NORVASC) 10 mg tablet TAKE 1 TABLET DAILY    metFORMIN (GLUCOPHAGE) 1,000 mg tablet TAKE 1 TABLET TWICE A DAY WITH MEALS    ONETOUCH VERIO strip Monitor daily    JARDIANCE 10 mg tablet TAKE 1 TABLET DAILY    BD INSULIN PEN NEEDLE UF MINI 31 gauge x 3/16\" ndle FOR USE WITH INSULIN INJECTIONS DAILY    UNIFINE PENTIPS PLUS 31 gauge x 1/4\" ndle FOR USE WITH INSULIN INJECT IONS DAILY    Insulin Needles, Disposable, (PEN NEEDLE) 32 x 5/32 \" ndle 1 each by SubCUTAneous route daily.  pioglitazone (ACTOS) 15 mg tablet Take 1 Tab by mouth daily. Replaces glipizide     No current facility-administered medications for this visit. Allergies   Allergen Reactions    Ace Inhibitors Cough       Review of Systems:  - Eyes: no blurry vision or double vision  - Cardiovascular: no chest pain  - Respiratory: no shortness of breath  - Musculoskeletal: no myalgias  - Neurological: no numbness/tingling in extremities    Physical Examination:  Visit Vitals    /86    Pulse 73    Ht 5' 7\" (1.702 m)    Wt 281 lb (127.5 kg)    BMI 44.01 kg/m2   -   - General: pleasant, no distress, normal gait   HEENT: hearing intact, EOMI, clear sclera without icterus  - Cardiovascular: regular, normal rate   - Respiratory: normal effort  - Integumentary: no edema  - Psychiatric: normal mood and affect    Data Reviewed:     Component      Latest Ref Rng & Units 9/6/2017 9/6/2017 9/6/2017 9/6/2017           9:25 AM  9:25 AM  9:25 AM  9:25 AM   Glucose      65 - 99 mg/dL   105 (H)    BUN      8 - 27 mg/dL   20    Creatinine      0.76 - 1.27 mg/dL   1.24    GFR est non-AA      >59 mL/min/1.73   57 (L)    GFR est AA      >59 mL/min/1.73   66    BUN/Creatinine ratio      10 - 24   16    Sodium      134 - 144 mmol/L   145 (H)    Potassium      3.5 - 5.2 mmol/L   4.3    Chloride      96 - 106 mmol/L   104    CO2      18 - 29 mmol/L   27    Calcium      8.6 - 10.2 mg/dL   9.5    Protein, total      6.0 - 8.5 g/dL   8.0    Albumin      3.5 - 4.8 g/dL   4.6    GLOBULIN, TOTAL      1.5 - 4.5 g/dL   3.4    A-G Ratio      1.2 - 2.2   1.4    Bilirubin, total      0.0 - 1.2 mg/dL   0.5    Alk.  phosphatase      39 - 117 IU/L   73    AST      0 - 40 IU/L   19    ALT (SGPT)      0 - 44 IU/L   24    Creatinine, urine      Not Estab. mg/dL 95.9      Microalbumin, urine      Not Estab. ug/mL 82.6      Microalbumin/Creat. Ratio      0.0 - 30.0 mg/g creat 86.1 (H)      Hemoglobin A1c, (calculated)      4.8 - 5.6 %       Estimated average glucose      mg/dL       Fructosamine      0 - 285 umol/L    333 (H)   C-Peptide      1.1 - 4.4 ng/mL  3.4       Component      Latest Ref Rng & Units 9/6/2017           9:25 AM   Glucose      65 - 99 mg/dL    BUN      8 - 27 mg/dL    Creatinine      0.76 - 1.27 mg/dL    GFR est non-AA      >59 mL/min/1.73    GFR est AA      >59 mL/min/1.73    BUN/Creatinine ratio      10 - 24    Sodium      134 - 144 mmol/L    Potassium      3.5 - 5.2 mmol/L    Chloride      96 - 106 mmol/L    CO2      18 - 29 mmol/L    Calcium      8.6 - 10.2 mg/dL    Protein, total      6.0 - 8.5 g/dL    Albumin      3.5 - 4.8 g/dL    GLOBULIN, TOTAL      1.5 - 4.5 g/dL    A-G Ratio      1.2 - 2.2    Bilirubin, total      0.0 - 1.2 mg/dL    Alk. phosphatase      39 - 117 IU/L    AST      0 - 40 IU/L    ALT (SGPT)      0 - 44 IU/L    Creatinine, urine      Not Estab. mg/dL    Microalbumin, urine      Not Estab. ug/mL    Microalbumin/Creat. Ratio      0.0 - 30.0 mg/g creat    Hemoglobin A1c, (calculated)      4.8 - 5.6 % 7.7 (H)   Estimated average glucose      mg/dL 174   Fructosamine      0 - 285 umol/L    C-Peptide      1.1 - 4.4 ng/mL      Assessment/Plan:   1. Type II diabetes mellitus with nephropathy ([facility] [address]   Not controlled  Encouraged him to focus more closely on dietary efforts, particularly avoiding sugary beverages and juice. Increase glipizide to 5 mg twice daily  Continue metformin, Victoza, Jardiance   2. Essential hypertension   Continue losartan and amlodipine. Will follow   3. BMI 40.0-44.9, adult ([facility])   Encouraged avoidance of liquid calories as well as portion control and regular exercise     Patient Instructions   Diabetes:    Decrease intake of juices and carbohydrates.      Medications:  Continue metformin  Continue Victoza to 1.8 mg daily  Continue Jardiance 10 mg     Increase glipizide to 5 mg twice daily. Lower dose back down if you have values < 90    Goals for blood sugars:  Fasting  (less than 150)  Other times -  (less than 180). Blood pressure:  Continue  losartan to 50 mg twice daily  Continue amlodipine    Kidneys:  Look stable  Blood pressure and glucose control      Follow-up Disposition:  Return in about 3 months (around 4/10/2018).     Copy sent to:

## 2018-01-10 NOTE — PATIENT INSTRUCTIONS
Diabetes:    Decrease intake of juices and carbohydrates. Medications:  Continue metformin  Continue Victoza to 1.8 mg daily  Continue Jardiance 10 mg     Increase glipizide to 5 mg twice daily. Lower dose back down if you have values < 90    Goals for blood sugars:  Fasting  (less than 150)  Other times -  (less than 180).     Blood pressure:  Continue  losartan to 50 mg twice daily  Continue amlodipine    Kidneys:  Look stable  Blood pressure and glucose control

## 2018-01-11 LAB
ALBUMIN/CREAT UR: 104.8 MG/G CREAT (ref 0–30)
BUN SERPL-MCNC: 15 MG/DL (ref 8–27)
BUN/CREAT SERPL: 13 (ref 10–24)
CALCIUM SERPL-MCNC: 9.2 MG/DL (ref 8.6–10.2)
CHLORIDE SERPL-SCNC: 103 MMOL/L (ref 96–106)
CHOLEST SERPL-MCNC: 165 MG/DL (ref 100–199)
CO2 SERPL-SCNC: 21 MMOL/L (ref 18–29)
CREAT SERPL-MCNC: 1.12 MG/DL (ref 0.76–1.27)
CREAT UR-MCNC: 75.1 MG/DL
EST. AVERAGE GLUCOSE BLD GHB EST-MCNC: 240 MG/DL
GLUCOSE SERPL-MCNC: 102 MG/DL (ref 65–99)
HBA1C MFR BLD: 10 % (ref 4.8–5.6)
HDLC SERPL-MCNC: 57 MG/DL
INTERPRETATION, 910389: NORMAL
LDLC SERPL CALC-MCNC: 89 MG/DL (ref 0–99)
Lab: NORMAL
MICROALBUMIN UR-MCNC: 78.7 UG/ML
POTASSIUM SERPL-SCNC: 4.3 MMOL/L (ref 3.5–5.2)
SODIUM SERPL-SCNC: 145 MMOL/L (ref 134–144)
TRIGL SERPL-MCNC: 93 MG/DL (ref 0–149)
VLDLC SERPL CALC-MCNC: 19 MG/DL (ref 5–40)

## 2018-01-11 RX ORDER — AMLODIPINE BESYLATE 10 MG/1
TABLET ORAL
Qty: 90 TAB | Refills: 1 | Status: SHIPPED | OUTPATIENT
Start: 2018-01-11 | End: 2018-08-01 | Stop reason: SDUPTHER

## 2018-04-18 ENCOUNTER — OFFICE VISIT (OUTPATIENT)
Dept: ENDOCRINOLOGY | Age: 73
End: 2018-04-18

## 2018-04-18 VITALS
HEART RATE: 81 BPM | HEIGHT: 67 IN | WEIGHT: 274 LBS | DIASTOLIC BLOOD PRESSURE: 79 MMHG | BODY MASS INDEX: 43 KG/M2 | SYSTOLIC BLOOD PRESSURE: 137 MMHG

## 2018-04-18 DIAGNOSIS — R80.9 MICROALBUMINURIA: ICD-10-CM

## 2018-04-18 DIAGNOSIS — E11.21 TYPE 2 DIABETES MELLITUS WITH DIABETIC NEPHROPATHY, WITHOUT LONG-TERM CURRENT USE OF INSULIN (HCC): Primary | ICD-10-CM

## 2018-04-18 DIAGNOSIS — N28.9 MILD RENAL INSUFFICIENCY: ICD-10-CM

## 2018-04-18 DIAGNOSIS — I10 ESSENTIAL HYPERTENSION: ICD-10-CM

## 2018-04-18 NOTE — PATIENT INSTRUCTIONS
Diabetes:    Continue efforts to monitor intake of juices and carbohydrates. Medications:  Continue metformin  Continue Victoza to 1.8 mg daily  Continue Jardiance 10 mg   Continue glipizide 5 mg twice daily. Lower dose back down if you have values < 90    Goals for blood sugars:  Fasting  (less than 150)  Other times -  (less than 180).     Blood pressure:  Continue  losartan to 50 mg twice daily  Continue amlodipine    Kidneys:  Look stable  Blood pressure and glucose control

## 2018-04-18 NOTE — PROGRESS NOTES
Microalbumin, foot exam, eye exam up to date. Patient complains of chronic knee and ankle pain, today is worse. Patient would like to discuss CGMs.

## 2018-04-18 NOTE — PROGRESS NOTES
History of Present Illness: Jerry Bauer is a 74 y.o. male presents for follow-up of diabetes. Also has HTN. + microalbuminuria. Has CKD -overall trend has been stable     Current diabetes regimen:  Metformin 1 g BID  Victoza 1.8 mg  Glipizide 2.5 mg BID  Jardiance 10 mg daily. - does occasionally forget to take medications    Glucoses:   Not monitoring until today. Checked this AM - 127. Diet: Reviewed  Trying to drink [place] water. Has decreased sugar intake. Weight - down 7 lbs. Exercise: limited by knees hurting. HTN: Taking amlodipine and losartan. Kidneys and microalbuminuria - these appear stable     Lipids - controlled without statin. He prefers not to take     Social:lives with wife and teenage grandson - [place]    Past Medical History:   Diagnosis Date    Arthritis     knee arthritis    Diabetes mellitus ([facility] [address]     Hearing loss     Has seen Dr. Oral Garcia Hypertension     Obesity      Current Outpatient Prescriptions   Medication Sig    amLODIPine (NORVASC) 10 mg tablet TAKE 1 TABLET DAILY    JARDIANCE 10 mg tablet TAKE 1 TABLET DAILY    miscellaneous medical supply misc Order for diabetic shoes and inserts.  losartan (COZAAR) 50 mg tablet TAKE 1 TABLET TWICE A DAY    glipiZIDE (GLUCOTROL) 5 mg tablet Take 1 Tab by mouth two (2) times a day. (Patient taking differently: Take 2.5 mg by mouth two (2) times a day.)    VICTOZA 3-RONI 0.6 mg/0.1 mL (18 mg/3 mL) pnij INJECT 1.8 MG UNDER THE SKIN DAILY    metFORMIN (GLUCOPHAGE) 1,000 mg tablet TAKE 1 TABLET TWICE A DAY WITH MEALS    ONETOUCH VERIO strip Monitor daily    BD INSULIN PEN NEEDLE UF MINI 31 gauge x 3/16\" ndle FOR USE WITH INSULIN INJECTIONS DAILY    UNIFINE PENTIPS PLUS 31 gauge x 1/4\" ndle FOR USE WITH INSULIN INJECT IONS DAILY    Insulin Needles, Disposable, (PEN NEEDLE) 32 x 5/32 \" ndle 1 each by SubCUTAneous route daily. No current facility-administered medications for this visit. Allergies   Allergen Reactions    Ace Inhibitors Cough       Review of Systems:  - Eyes: no blurry vision or double vision  - Cardiovascular: no chest pain  - Respiratory: no shortness of breath  - Musculoskeletal: no myalgias  - Neurological: no numbness/tingling in extremities    Physical Examination:  Visit Vitals    /79    Pulse 81    Ht 5' 7\" (1.702 m)    Wt 274 lb (124.3 kg)    BMI 42.91 kg/m2   -   - General: pleasant, no distress, normal gait   HEENT: hearing intact, EOMI, clear sclera without icterus  - Cardiovascular: regular, normal rate   - Respiratory: normal effort  - Integumentary: no edema   Psychiatric: normal mood and affect    Data Reviewed:   Component      Latest Ref Rng & Units 1/10/2018 1/10/2018 1/10/2018 1/10/2018          11:07 AM 11:07 AM 11:07 AM 11:07 AM   Glucose      65 - 99 mg/dL 102 (H)      BUN      8 - 27 mg/dL 15      Creatinine      0.76 - 1.27 mg/dL 1.12      GFR est non-AA      >59 mL/min/1.73 64      GFR est AA      >59 mL/min/1.73 74      BUN/Creatinine ratio      10 - 24 13      Sodium      134 - 144 mmol/L 145 (H)      Potassium      3.5 - 5.2 mmol/L 4.3      Chloride      96 - 106 mmol/L 103      CO2      18 - 29 mmol/L 21      Calcium      8.6 - 10.2 mg/dL 9.2      Cholesterol, total      100 - 199 mg/dL  165     Triglyceride      0 - 149 mg/dL  93     HDL Cholesterol      >39 mg/dL  57     VLDL, calculated      5 - 40 mg/dL  19     LDL, calculated      0 - 99 mg/dL  89     Creatinine, urine      Not Estab. mg/dL    75.1   Microalbumin, urine      Not Estab. ug/mL    78.7   Microalbumin/Creat. Ratio      0.0 - 30.0 mg/g creat    104.8 (H)   Hemoglobin A1c, (calculated)      4.8 - 5.6 %   10.0 (H)    Estimated average glucose      mg/dL   240    Fructosamine      0 - 285 umol/L           Assessment/Plan:   1.  Type 2 diabetes mellitus with diabetic nephropathy, without long-term current use of insulin ([address]   Recommend he consider lowering glipizide dose if he has low glucoses, as encouraged continued efforts with dietary improvements and increasing exercise. Continue metformin, Victoza, Jardiance, glipizide. 2. Microalbuminuria   Continue losartan, Jardiance, blood pressure control efforts. 3. BMI 40.0-44.9, adult ([address]   Encouraged continued efforts to watch liquid calorie portions. Encouraged lower carbohydrate portions, and general   4. Mild renal insufficiency   This appears overall stable. Blood pressure and diabetes control. 5. Essential hypertension   Continue losartan and amlodipine     Patient Instructions   Diabetes:    Continue efforts to monitor intake of juices and carbohydrates. Medications:  Continue metformin  Continue Victoza to 1.8 mg daily  Continue Jardiance 10 mg   Continue glipizide 5 mg twice daily. Lower dose back down if you have values < 90    Goals for blood sugars:  Fasting  (less than 150)  Other times -  (less than 180). Blood pressure:  Continue  losartan to 50 mg twice daily  Continue amlodipine    Kidneys:  Look stable  Blood pressure and glucose control      Follow-up Disposition:  Return in about 3 months (around 7/18/2018).     Copy sent to:

## 2018-04-18 NOTE — MR AVS SNAPSHOT
[address] [facility] [address] [address] [phone] Patient: Violetta Kolb MRN:  [id number] Visit Information Date & Time Provider Department Dept. Phone Encounter #  
 4/18/2018  9:10 AM Berta Vazquez, [address] [place] [address] Diabetes and Endocrinology [phone] [id number] Follow-up Instructions Return in about 3 months (around 7/18/2018). Your Appointments 5/16/2018  9:00 AM  
ROUTINE CARE with Jennifer Palacios MD  
[facility]) Appt Note: 6 mon [facility] [address] [id number]  
[address] [place] [address] Upcoming Health Maintenance Date Due Pneumococcal 65+ Low/Medium Risk (2 of 2 - PPSV23) 11/17/2017 MEDICARE YEARLY EXAM 5/18/2018 EYE EXAM RETINAL OR DILATED Q1 6/20/2018 HEMOGLOBIN A1C Q6M 7/10/2018 FOOT EXAM Q1 9/6/2018 MICROALBUMIN Q1 1/10/2019 LIPID PANEL Q1 1/10/2019 COLONOSCOPY 5/15/2019 GLAUCOMA SCREENING Q2Y 6/20/2019 DTaP/Tdap/Td series (2 - Td) 5/28/2025 Allergies as of 4/18/2018  Review Complete On: 4/18/2018 By: Berta Vazquez MD  
  
 Severity Noted Reaction Type Reactions Ace Inhibitors  04/18/2014   Side Effect Cough Current Immunizations  Reviewed on 5/17/2017 Name Date Influenza Vaccine 11/26/2014 11:24 AM, 9/4/2013 Influenza Vaccine Deepa Christian) 11/23/2015 Influenza Vaccine (Quad) PF 11/16/2017  9:40 AM, 11/17/2016 Pneumococcal Conjugate (PCV-13) 11/17/2016 Tdap 5/28/2015 Not reviewed this visit You Were Diagnosed With   
  
 Codes Comments Type 2 diabetes mellitus with diabetic nephropathy, without long-term current use of insulin (HCC)    -  Primary ICD-10-CM: E11.21 
ICD-9-CM: 250.40, 583.81 Microalbuminuria     ICD-10-CM: R80.9 ICD-9-CM: 791.0 BMI 40.0-44.9, adult [facility])     ICD-10-CM: Z68.41 
ICD-9-CM: V85.41 Mild renal insufficiency     ICD-10-CM: N28.9 ICD-9-CM: 593.9 Essential hypertension     ICD-10-CM: I10 
ICD-9-CM: 401.9 Vitals BP Pulse Height(growth percentile) Weight(growth percentile) BMI Smoking Status 137/79 81 5' 7\" (1.702 m) 274 lb (124.3 kg) 42.91 kg/m2 Former Smoker Vitals History BMI and BSA Data Body Mass Index Body Surface Area 42.91 kg/m 2 2.42 m 2 Preferred Pharmacy Pharmacy Name Phone [address] [facility] [phone] Your Updated Medication List  
  
   
This list is accurate as of 4/18/18 10:26 AM.  Always use your most recent med list. amLODIPine 10 mg tablet Commonly known as:  Casey Saravia TAKE 1 TABLET DAILY  
  
 glipiZIDE 5 mg tablet Commonly known as:  Nathaniel Singleton Take 1 Tab by mouth two (2) times a day. Insulin Needles (Disposable) 32 gauge x 5/32\" Ndle Commonly known as:  PEN NEEDLE  
1 each by SubCUTAneous route daily. * UNIFINE PENTIPS PLUS 31 gauge x 1/4\" Ndle Generic drug:  Insulin Needles (Disposable) FOR USE WITH INSULIN INJECT IONS DAILY * BD INSULIN PEN NEEDLE UF MINI 31 gauge x 3/16\" Ndle Generic drug:  Insulin Needles (Disposable) FOR USE WITH INSULIN INJECTIONS DAILY JARDIANCE 10 mg tablet Generic drug:  empagliflozin TAKE 1 TABLET DAILY losartan 50 mg tablet Commonly known as:  COZAAR  
TAKE 1 TABLET TWICE A DAY  
  
 metFORMIN 1,000 mg tablet Commonly known as:  GLUCOPHAGE  
TAKE 1 TABLET TWICE A DAY WITH MEALS  
  
 miscellaneous medical supply Misc Order for diabetic shoes and inserts. ONETOUCH VERIO strip Generic drug:  glucose blood VI test strips Monitor daily VICTOZA 3-RONI 0.6 mg/0.1 mL (18 mg/3 mL) Pnij Generic drug:  Liraglutide INJECT 1.8 MG UNDER THE SKIN DAILY * Notice: This list has 2 medication(s) that are the same as other medications prescribed for you. Read the directions carefully, and ask your doctor or other care provider to review them with you. We Performed the Following FRUCTOSAMINE [67645 CPT(R)] HEMOGLOBIN A1C WITH EAG [48641 CPT(R)] METABOLIC PANEL, COMPREHENSIVE [98918 CPT(R)] Follow-up Instructions Return in about 3 months (around 7/18/2018). Patient Instructions Diabetes: 
 
Continue efforts to monitor intake of juices and carbohydrates. Medications: 
Continue metformin Continue Victoza to 1.8 mg daily Continue Jardiance 10 mg  
Continue glipizide 5 mg twice daily. Lower dose back down if you have values < 90 
 
Goals for blood sugars: 
Fasting  (less than 150) Other times -  (less than 180). Blood pressure: 
Continue  losartan to 50 mg twice daily Continue amlodipine Kidneys: 
Look stable Blood pressure and glucose control Introducing [facility] SERVICES! Dear Gwen Lim: Thank you for requesting a Yapp account. Our records indicate that you already have an active Yapp account. You can access your account anytime at [email or web address] Did you know that you can access your hospital and ER discharge instructions at any time in Yapp? You can also review all of your test results from your hospital stay or ER visit. Additional Information If you have questions, please visit the Frequently Asked Questions section of the Yapp website at [email or web address] Remember, Yapp is NOT to be used for urgent needs. For medical emergencies, dial 911. Now available from your iPhone and Android! Please provide this summary of care documentation to your next provider. Your primary care clinician is listed as [place]. If you have any questions after today's visit, please call [phone].

## 2018-04-19 LAB
ALBUMIN SERPL-MCNC: 4.4 G/DL (ref 3.5–4.8)
ALBUMIN/GLOB SERPL: 1.2 {RATIO} (ref 1.2–2.2)
ALP SERPL-CCNC: 75 IU/L (ref 39–117)
ALT SERPL-CCNC: 18 IU/L (ref 0–44)
AST SERPL-CCNC: 15 IU/L (ref 0–40)
BILIRUB SERPL-MCNC: 0.4 MG/DL (ref 0–1.2)
BUN SERPL-MCNC: 13 MG/DL (ref 8–27)
BUN/CREAT SERPL: 10 (ref 10–24)
CALCIUM SERPL-MCNC: 9.2 MG/DL (ref 8.6–10.2)
CHLORIDE SERPL-SCNC: 104 MMOL/L (ref 96–106)
CO2 SERPL-SCNC: 25 MMOL/L (ref 18–29)
CREAT SERPL-MCNC: 1.29 MG/DL (ref 0.76–1.27)
EST. AVERAGE GLUCOSE BLD GHB EST-MCNC: 214 MG/DL
FRUCTOSAMINE SERPL-SCNC: 364 UMOL/L (ref 0–285)
GFR SERPLBLD CREATININE-BSD FMLA CKD-EPI: 54 ML/MIN/1.73
GFR SERPLBLD CREATININE-BSD FMLA CKD-EPI: 63 ML/MIN/1.73
GLOBULIN SER CALC-MCNC: 3.7 G/DL (ref 1.5–4.5)
GLUCOSE SERPL-MCNC: 87 MG/DL (ref 65–99)
HBA1C MFR BLD: 9.1 % (ref 4.8–5.6)
INTERPRETATION: NORMAL
Lab: NORMAL
POTASSIUM SERPL-SCNC: 4 MMOL/L (ref 3.5–5.2)
PROT SERPL-MCNC: 8.1 G/DL (ref 6–8.5)
SODIUM SERPL-SCNC: 144 MMOL/L (ref 134–144)

## 2018-05-16 ENCOUNTER — OFFICE VISIT (OUTPATIENT)
Dept: INTERNAL MEDICINE CLINIC | Age: 73
End: 2018-05-16

## 2018-05-16 VITALS
RESPIRATION RATE: 16 BRPM | HEART RATE: 78 BPM | SYSTOLIC BLOOD PRESSURE: 123 MMHG | BODY MASS INDEX: 43.85 KG/M2 | TEMPERATURE: 97.7 F | HEIGHT: 67 IN | OXYGEN SATURATION: 96 % | DIASTOLIC BLOOD PRESSURE: 74 MMHG | WEIGHT: 279.4 LBS

## 2018-05-16 DIAGNOSIS — I10 ESSENTIAL HYPERTENSION: ICD-10-CM

## 2018-05-16 DIAGNOSIS — R80.9 MICROALBUMINURIA: ICD-10-CM

## 2018-05-16 DIAGNOSIS — E11.21 TYPE 2 DIABETES MELLITUS WITH DIABETIC NEPHROPATHY, WITHOUT LONG-TERM CURRENT USE OF INSULIN (HCC): Primary | ICD-10-CM

## 2018-05-16 DIAGNOSIS — M25.562 LEFT KNEE PAIN, UNSPECIFIED CHRONICITY: ICD-10-CM

## 2018-05-16 DIAGNOSIS — N28.9 MILD RENAL INSUFFICIENCY: ICD-10-CM

## 2018-05-16 DIAGNOSIS — H91.90 HEARING LOSS, UNSPECIFIED HEARING LOSS TYPE, UNSPECIFIED LATERALITY: ICD-10-CM

## 2018-05-16 DIAGNOSIS — Z23 ENCOUNTER FOR IMMUNIZATION: ICD-10-CM

## 2018-05-16 PROBLEM — E66.01 OBESITY, MORBID (HCC): Status: ACTIVE | Noted: 2018-05-16

## 2018-05-16 NOTE — MR AVS SNAPSHOT
[address] [facility] 
[phone] Patient: Misha Snyder MRN:  [id number] Visit Information Date & Time Provider Department Dept. Phone Encounter #  
 5/16/2018  9:00 AM Sami Snider, [address] [facility] [phone] [id number] Follow-up Instructions Return in about 6 months (around 11/16/2018) for HTN, etc.  
  
Your Appointments 7/25/2018  9:50 AM  
Follow Up with MD Samir Calles Diabetes and Endocrinology [facility]) Appt Note: 3 month f/u  
 [address] [phone] [address] Upcoming Health Maintenance Date Due Pneumococcal 65+ Low/Medium Risk (2 of 2 - PPSV23) 11/17/2017 MEDICARE YEARLY EXAM 5/18/2018 EYE EXAM RETINAL OR DILATED Q1 6/20/2018 Influenza Age 5 to Adult 8/1/2018 FOOT EXAM Q1 9/6/2018 HEMOGLOBIN A1C Q6M 10/18/2018 MICROALBUMIN Q1 1/10/2019 LIPID PANEL Q1 1/10/2019 COLONOSCOPY 5/15/2019 GLAUCOMA SCREENING Q2Y 6/20/2019 DTaP/Tdap/Td series (2 - Td) 5/28/2025 Allergies as of 5/16/2018  Review Complete On: 5/16/2018 By: Sami Snider MD  
  
 Severity Noted Reaction Type Reactions Ace Inhibitors  04/18/2014   Side Effect Cough Current Immunizations  Reviewed on 5/17/2017 Name Date Influenza Vaccine 11/26/2014 11:24 AM, 9/4/2013 Influenza Vaccine Mira ) 11/23/2015 Influenza Vaccine (Quad) PF 11/16/2017  9:40 AM, 11/17/2016 Pneumococcal Conjugate (PCV-13) 11/17/2016 Tdap 5/28/2015 Not reviewed this visit You Were Diagnosed With   
  
 Codes Comments Type 2 diabetes mellitus with diabetic nephropathy, without long-term current use of insulin (HCC)    -  Primary ICD-10-CM: E11.21 
ICD-9-CM: 250.40, 583.81  Essential hypertension     ICD-10-CM: I10 
 ICD-9-CM: 401.9 Mild renal insufficiency     ICD-10-CM: N28.9 ICD-9-CM: 593.9 Microalbuminuria     ICD-10-CM: R80.9 ICD-9-CM: 791.0 Left knee pain, unspecified chronicity     ICD-10-CM: Y17.589 ICD-9-CM: 719.46 Hearing loss, unspecified hearing loss type, unspecified laterality     ICD-10-CM: H91.90 ICD-9-CM: 548. 9 Vitals BP Pulse Temp Resp Height(growth percentile) Weight(growth percentile) 123/74 (BP 1 Location: Left arm, BP Patient Position: Sitting) 78 97.7 °F (36.5 °C) (Oral) 16 5' 7\" (1.702 m) 279 lb 6.4 oz (126.7 kg) SpO2 BMI Smoking Status 96% 43.76 kg/m2 Former Smoker Vitals History BMI and BSA Data Body Mass Index Body Surface Area 43.76 kg/m 2 2.45 m 2 Preferred Pharmacy Pharmacy Name Phone [place] [phone] , [address] AT [place] [address] [phone] Your Updated Medication List  
  
   
This list is accurate as of 5/16/18  9:34 AM.  Always use your most recent med list. amLODIPine 10 mg tablet Commonly known as:  Haig Quimby TAKE 1 TABLET DAILY  
  
 glipiZIDE 5 mg tablet Commonly known as:  Yashira Lat Take 1 Tab by mouth two (2) times a day. Insulin Needles (Disposable) 32 gauge x 5/32\" Ndle Commonly known as:  PEN NEEDLE  
1 each by SubCUTAneous route daily. * UNIFINE PENTIPS PLUS 31 gauge x 1/4\" Ndle Generic drug:  Insulin Needles (Disposable) FOR USE WITH INSULIN INJECT IONS DAILY * BD ULTRA-FINE MINI PEN NEEDLE 31 gauge x 3/16\" Ndle Generic drug:  Insulin Needles (Disposable) USE WITH INSULIN INJECTIONS DAILY JARDIANCE 10 mg tablet Generic drug:  empagliflozin TAKE 1 TABLET DAILY losartan 50 mg tablet Commonly known as:  COZAAR  
TAKE 1 TABLET TWICE A DAY  
  
 metFORMIN 1,000 mg tablet Commonly known as:  GLUCOPHAGE  
TAKE 1 TABLET TWICE A DAY WITH MEALS  
  
 miscellaneous medical supply Misc Order for diabetic shoes and inserts. ONETOUCH VERIO strip Generic drug:  glucose blood VI test strips Monitor daily VICTOZA 3-RONI 0.6 mg/0.1 mL (18 mg/3 mL) Pnij Generic drug:  Liraglutide INJECT 1.8 MG UNDER THE SKIN DAILY * Notice: This list has 2 medication(s) that are the same as other medications prescribed for you. Read the directions carefully, and ask your doctor or other care provider to review them with you. We Performed the Following REFERRAL TO ENT-OTOLARYNGOLOGY [DNI61 Custom] REFERRAL TO ORTHOPEDIC SURGERY [REF62 Custom] Follow-up Instructions Return in about 6 months (around 11/16/2018) for HTN, etc.  
  
  
Referral Information Referral ID Referred By Referred To  
  
 [id number] Esteban CORADO MD   
   [address] Phone: [phone] Fax: [phone] Visits Status Start Date End Date 1 New Request 5/16/18 5/16/19 If your referral has a status of pending review or denied, additional information will be sent to support the outcome of this decision. Referral ID Referred By Referred To  
 [id number] Maria D CORADO MD  
   [address] Phone: [phone] Fax: [phone] Visits Status Start Date End Date 1 New Request 5/16/18 5/16/19 If your referral has a status of pending review or denied, additional information will be sent to support the outcome of this decision. Introducing [facility] & HEALTH SERVICES! Dear Mojgan Castillo: Thank you for requesting a Bio[employer] account. Our records indicate that you already have an active BioScience account. You can access your account anytime at [email or web address] Did you know that you can access your hospital and ER discharge instructions at any time in [email or web address]? You can also review all of your test results from your hospital stay or ER visit. Additional Information If you have questions, please visit the Frequently Asked Questions section of the [email or web address] website at [email or web address] Remember, [email or web address] is NOT to be used for urgent needs. For medical emergencies, dial 911. Now available from your iPhone and Android! Please provide this summary of care documentation to your next provider. Your primary care clinician is listed as [place]. If you have any questions after today's visit, please call [phone].

## 2018-05-16 NOTE — PROGRESS NOTES
SUBJECTIVE:   Mr. Sherle Buerger is a 74 y.o. male who is here for follow up of routine medical issues. Previously he saw Dr. Tanisha Peng. Chief Complaint   Patient presents with    Hypertension     pt here today for 6 month f.u     Diabetes    Knee Pain     pt c/o pain in L knee; pt rates pain 7/10 on pain scale       He has been diabetic since about 2005. [facility] His BP at home is running [id number] systolic. High here today. Knee pain \"every now and then; doing fine for the most part. \"  L knee. Also stiffness in AM for a little while, a few minutes (less than 15)--back, hips. \"Gets better once I get moving around. \"   At this time, he is otherwise doing well and has brought no other complaints to my attention today. For a list of the medical issues addressed today, see the assessment and plan below. PMH:   Past Medical History:   Diagnosis Date    Arthritis     knee arthritis    Diabetes mellitus ([facility] [address]     Hearing loss     Has seen Dr. Virginia Cuba Hypertension     Obesity        Past Surgical History:   Procedure Laterality Date    HX CATARACT REMOVAL  ~ 2009    Bilateral    HX ORTHOPAEDIC Left 12/13/2013    shoulder    WA COLONOSCOPY FLX DX W/COLLJ SPEC WHEN PFRMD  5/15/2014    Dr. Pancho Luis       All: He is allergic to ace inhibitors. Current Outpatient Prescriptions   Medication Sig    BD ULTRA-FINE MINI PEN NEEDLE 31 gauge x 3/16\" ndle USE WITH INSULIN INJECTIONS DAILY    amLODIPine (NORVASC) 10 mg tablet TAKE 1 TABLET DAILY    JARDIANCE 10 mg tablet TAKE 1 TABLET DAILY    miscellaneous medical supply misc Order for diabetic shoes and inserts.  losartan (COZAAR) 50 mg tablet TAKE 1 TABLET TWICE A DAY    glipiZIDE (GLUCOTROL) 5 mg tablet Take 1 Tab by mouth two (2) times a day.  (Patient taking differently: Take 2.5 mg by mouth two (2) times a day.)    VICTOZA 3-RONI 0.6 mg/0.1 mL (18 mg/3 mL) pnij INJECT 1.8 MG UNDER THE SKIN DAILY    metFORMIN (GLUCOPHAGE) 1,000 mg tablet TAKE 1 TABLET TWICE A DAY WITH MEALS    ONETOUCH VERIO strip Monitor daily    UNIFINE PENTIPS PLUS 31 gauge x 1/4\" ndle FOR USE WITH INSULIN INJECT IONS DAILY    Insulin Needles, Disposable, (PEN NEEDLE) 32 x 5/32 \" ndle 1 each by SubCUTAneous route daily. No current facility-administered medications for this visit. FH:  Mother had lung cancer, also had HTN. Father of  of prostate cancer age 39. His sister has had breast cancer. His family history includes Heart Disease in his paternal grandmother; Hypertension in his mother; and brain aneurysm in his maternal grandfather. His maternal grandmother  of complications of childbirth. SH: He is retired . [employer] . . He reports that he quit smoking about 48 years ago. He has never used smokeless tobacco. He reports that he does not drink alcohol or use illicit drugs. ROS: See above; Complete ROS otherwise negative. OBJECTIVE:   Vitals:   Visit Vitals    /74 (BP 1 Location: Left arm, BP Patient Position: Sitting)    Pulse 78    Temp 97.7 °F (36.5 °C) (Oral)    Resp 16    Ht 5' 7\" (1.702 m)    Wt 279 lb 6.4 oz (126.7 kg)    SpO2 96%    BMI 43.76 kg/m2      Gen: Pleasant, obese 74 y.o. AA male in [facility]. HEENT: PERRLA. EOMI. OP moist and pink. TM: Pearly bilaterally. Neck: Supple. No LAD. HEART: RRR, No M/G/R.    LUNGS: CTAB No W/R. ABDOMEN: S, NT, ND, BS+. EXTREMITIES: Warm. No C/C/E.  MUSCULOSKELETAL: Normal ROM, muscle strength 5/5 all groups. NEURO: Alert and oriented x 3. Cranial nerves grossly intact. No focal sensory or motor deficits noted. SKIN: Warm. Dry. No rashes or other lesions noted.     Lab Results   Component Value Date/Time    Sodium 144 2018 10:35 AM    Potassium 4.0 2018 10:35 AM    Chloride 104 2018 10:35 AM    CO2 25 2018 10:35 AM    Glucose 87 2018 10:35 AM    BUN 13 2018 10:35 AM    Creatinine 1.29 (H) 2018 10:35 AM BUN/Creatinine ratio 10 04/18/2018 10:35 AM    GFR est AA 63 04/18/2018 10:35 AM    GFR est non-AA 54 (L) 04/18/2018 10:35 AM    Calcium 9.2 04/18/2018 10:35 AM    Bilirubin, total 0.4 04/18/2018 10:35 AM    ALT (SGPT) 18 04/18/2018 10:35 AM    AST (SGOT) 15 04/18/2018 10:35 AM    Alk. phosphatase 75 04/18/2018 10:35 AM    Protein, total 8.1 04/18/2018 10:35 AM    Albumin 4.4 04/18/2018 10:35 AM    A-G Ratio 1.2 04/18/2018 10:35 AM       Lab Results   Component Value Date/Time    Cholesterol, total 165 01/10/2018 11:07 AM    HDL Cholesterol 57 01/10/2018 11:07 AM    LDL, calculated 89 01/10/2018 11:07 AM    Triglyceride 93 01/10/2018 11:07 AM        Lab Results   Component Value Date/Time    Hemoglobin A1c 9.1 (H) 04/18/2018 10:35 AM       Lab Results   Component Value Date/Time    WBC 9.6 08/11/2014 07:50 AM    HGB 12.7 08/11/2014 07:50 AM    HCT 38.7 08/11/2014 07:50 AM    PLATELET 042 41/47/9380 07:50 AM    MCV 85 08/11/2014 07:50 AM         ASSESSMENT/ PLAN:     1. HTN (hypertension): Up and down in the office; typically normal at home. Likely has \"white coat syndrome\". 2. Osteoarthritis: L knee pain flaring up lately. Inhibits ambulation at times--he has a handicap placard. Ref orthopedics. 3. Mild renal insufficiency: May be developing some CKD due to DM +/- HTN. For now, work on DM control and continue ARB. 4. Diabetes mellitus: Not controlled. As per Dr. Handy Alcantar. 5. Obesity: Diet and exercise. 6. Hearing loss: Progressive. Referred previously to Dr. Karon Burgos. Follow-up Disposition:  Return in about 6 months (around 11/16/2018) for HTN, etc.    I have reviewed the patient's medications and risks/side effects/benefits were discussed. Diagnosis(-es) explained to patient and questions answered. Literature provided where appropriate. Discussed the patient's BMI with him.   The BMI follow up plan is as follows:     dietary management education, guidance, and counseling  encourage exercise  monitor weight  prescribed dietary intake    An After Visit Summary was printed and given to the patient. Discussed the patient's BMI with him. The BMI follow up plan is as follows:     dietary management education, guidance, and counseling  encourage exercise  monitor weight  prescribed dietary intake    An After Visit Summary was printed and given to the patient.

## 2018-05-16 NOTE — PROGRESS NOTES
1. Have you been to the ER, urgent care clinic since your last visit? Hospitalized since your last visit?no    2. Have you seen or consulted any other health care providers outside of the [address] [place] [address] since your last visit? Include any pap smears or colon screening.  no

## 2018-07-25 ENCOUNTER — OFFICE VISIT (OUTPATIENT)
Dept: ENDOCRINOLOGY | Age: 73
End: 2018-07-25

## 2018-07-25 VITALS
BODY MASS INDEX: 43.51 KG/M2 | DIASTOLIC BLOOD PRESSURE: 76 MMHG | WEIGHT: 277.2 LBS | OXYGEN SATURATION: 91 % | SYSTOLIC BLOOD PRESSURE: 156 MMHG | RESPIRATION RATE: 15 BRPM | HEIGHT: 67 IN | HEART RATE: 85 BPM

## 2018-07-25 DIAGNOSIS — E11.21 TYPE 2 DIABETES WITH NEPHROPATHY (HCC): Primary | ICD-10-CM

## 2018-07-25 DIAGNOSIS — N18.30 CKD (CHRONIC KIDNEY DISEASE) STAGE 3, GFR 30-59 ML/MIN (HCC): ICD-10-CM

## 2018-07-25 DIAGNOSIS — I10 ESSENTIAL HYPERTENSION: ICD-10-CM

## 2018-07-25 NOTE — PROGRESS NOTES
History of Present Illness: Patria Zepeda is a 74 y.o. male presents for follow-up of diabetes. Also has HTN. + microalbuminuria (improved 2015). Has CKD -overall trend has been stable     Reports a rather severe knee arthritis. He very much would like to avoid having knee replacement surgery. He has had 2 steroid injections in his knee last visit    Current diabetes regimen:  Metformin 1 g BID  Victoza 1.8 mg  Glipizide 2.5 mg BID  Jardiance 10 mg daily. Glucoses:  194 yesterday fasting. Today 92. Diet:   Trying to cut back on fruit drinks. Using [employer]. Weight overall stable. Has maintained small degree of weight loss. HTN: BP higher today. Taking amlodipine and losartan. Unsure of reason for the blood pressure today. Kidneys: has been variable, but overall stable    Lipids - controlled without statin. He prefers not to take     Social:lives with wife ( who has been dx with pre-diabetes) and teenage grandson - [place]  L football fan - likes David, Samina, Flakita Fiore, Lucie . Past Medical History:   Diagnosis Date    Arthritis     knee arthritis    Diabetes mellitus ([facility]     Hearing loss     Has seen Dr. Henrique Brandt Hypertension     Obesity      Current Outpatient Prescriptions   Medication Sig    BD ULTRA-FINE MINI PEN NEEDLE 31 gauge x 3/16\" ndle USE WITH INSULIN INJECTIONS DAILY    amLODIPine (NORVASC) 10 mg tablet TAKE 1 TABLET DAILY    JARDIANCE 10 mg tablet TAKE 1 TABLET DAILY    miscellaneous medical supply misc Order for diabetic shoes and inserts.  losartan (COZAAR) 50 mg tablet TAKE 1 TABLET TWICE A DAY    glipiZIDE (GLUCOTROL) 5 mg tablet Take 1 Tab by mouth two (2) times a day.  (Patient taking differently: Take 2.5 mg by mouth two (2) times a day.)    VICTOZA 3-RONI 0.6 mg/0.1 mL (18 mg/3 mL) pnij INJECT 1.8 MG UNDER THE SKIN DAILY    metFORMIN (GLUCOPHAGE) 1,000 mg tablet TAKE 1 TABLET TWICE A DAY WITH MEALS    ONETOUCH VERIO strip Monitor daily    UNIFINE PENTIPS PLUS 31 gauge x 1/4\" ndle FOR USE WITH INSULIN INJECT IONS DAILY    Insulin Needles, Disposable, (PEN NEEDLE) 32 x 5/32 \" ndle 1 each by SubCUTAneous route daily. No current facility-administered medications for this visit. Allergies   Allergen Reactions    Ace Inhibitors Cough       Review of Systems:  - Eyes: no blurry vision or double vision  - Cardiovascular: no chest pain  - Respiratory: no shortness of breath  - Musculoskeletal: + knee arthritis  - Neurological: no numbness/tingling in extremities    Physical Examination:  Visit Vitals    /76 (BP 1 Location: Left arm, BP Patient Position: Sitting)    Pulse 85    Resp 15    Ht 5' 7\" (1.702 m)    Wt 277 lb 3.2 oz (125.7 kg)    SpO2 91%    BMI 43.42 kg/m2   -   - General: pleasant, no distress, normal gait   HEENT: hearing intact, EOMI, clear sclera without icterus  - Cardiovascular: regular, normal rate   - Respiratory: normal effort  - Integumentary: no edema  - Psychiatric: normal mood and affect    Data Reviewed:     Component      Latest Ref Rng & Units 4/18/2018 1/10/2018 1/10/2018 1/10/2018          10:35 AM 11:07 AM 11:07 AM 11:07 AM   Glucose      65 - 99 mg/dL 87      BUN      8 - 27 mg/dL 13      Creatinine      0.76 - 1.27 mg/dL 1.29 (H)      GFR est non-AA      >59 mL/min/1.73 54 (L)      GFR est AA      >59 mL/min/1.73 63      BUN/Creatinine ratio      10 - 24 10      Sodium      134 - 144 mmol/L 144      Potassium      3.5 - 5.2 mmol/L 4.0      Chloride      96 - 106 mmol/L 104      CO2      18 - 29 mmol/L 25      Calcium      8.6 - 10.2 mg/dL 9.2      Protein, total      6.0 - 8.5 g/dL 8.1      Albumin      3.5 - 4.8 g/dL 4.4      GLOBULIN, TOTAL      1.5 - 4.5 g/dL 3.7      A-G Ratio      1.2 - 2.2 1.2      Bilirubin, total      0.0 - 1.2 mg/dL 0.4      Alk.  phosphatase      39 - 117 IU/L 75      AST      0 - 40 IU/L 15      ALT (SGPT)      0 - 44 IU/L 18      Cholesterol, total      100 - 199 mg/dL  165     Triglyceride      0 - 149 mg/dL  93     HDL Cholesterol      >39 mg/dL  57     VLDL, calculated      5 - 40 mg/dL  19     LDL, calculated      0 - 99 mg/dL  89     Creatinine, urine      Not Estab. mg/dL    75.1   Microalbumin, urine      Not Estab. ug/mL    78.7   Microalbumin/Creat. Ratio      0.0 - 30.0 mg/g creat    104.8 (H)   Hemoglobin A1c, (calculated)      4.8 - 5.6 %   10.0 (H)    Estimated average glucose      mg/dL   240      Assessment/Plan:   1. Type 2 diabetes with nephropathy (HCC)   Appears the main problem is his glucose is spiking rapidly after meals. His fasting glucoses per his report on the chemistry are indicative of good control, but his hemoglobin A1c belies this. We will see if Ozempic is covered. This should be more effective for weight loss and glucose control and Pitocin. Start with 0.5 mg weekly. Encouraged him to contact me to increase to 1.0 mg dose if desired  For cost purposes, change Jardiance from 10 mg to 25 mg tablets. Take one half tablet daily. 2. Essential hypertension   Continue losartan and amlodipine. Blood pressure stable   3. BMI 40.0-44.9, adult ([address]   Tried optimize medications for weight loss. Encouraged avoidance of liquid calories   4. CKD (chronic kidney disease) stage 3, GFR 30-59 ml/min   Still. We will continue to follow. Blood pressure glucose control       Patient Instructions   Diabetes:    Continue efforts to monitor intake of juices and carbohydrates. Carbohydrates are broken down to glucose/sugar and may lead to glucoses rising dramatically after meals    Medications:  Continue metformin   Victoza to 1.8 mg daily -> ozempic 0.5 mg weekly  Jardiance 10 mg - > 25 mg tablet - take 1/2 tablet daily  Continue glipizide 5 mg twice daily. Lower dose back down if you have values < 90    Goals for blood sugars:  Fasting  (less than 150)  Lunch, dinner, bedtime -  (less than 180).     Blood pressure:  Continue losartan to 50 mg twice daily  Continue amlodipine    Kidneys:  Look stable      Follow-up Disposition:  Return in about 3 months (around 10/25/2018).     Copy sent to:

## 2018-07-25 NOTE — PROGRESS NOTES
1. Have you been to the ER, urgent care clinic since your last visit? Hospitalized since your last visit? No    2. Have you seen or consulted any other health care providers outside of the [address] [place] [address] since your last visit? Include any pap smears or colon screening. No     Chief Complaint   Patient presents with    Diabetes     Patient has eye exam 9/7/2018 with Dr. Makenna Whyte. Patient had recent foot exam with podiatrist, is unable to recall doctors name. Patient does not have BG meter or log today. Visit Vitals    /76 (BP 1 Location: Left arm, BP Patient Position: Sitting)    Pulse 85    Resp 15    Ht 5' 7\" (1.702 m)    Wt 277 lb 3.2 oz (125.7 kg)    SpO2 91%    BMI 43.42 kg/m2     Learning Assessment 7/25/2018   PRIMARY LEARNER Patient   PRIMARY LANGUAGE ENGLISH   LEARNER PREFERENCE PRIMARY DEMONSTRATION     -     -     -   ANSWERED BY patient   RELATIONSHIP SELF     Patient states he had Cortizone shot in left knee a month ago.

## 2018-07-25 NOTE — MR AVS SNAPSHOT
[address] [facility] [address] [id number] [phone] Patient: Brennon Clancy MRN:  [id number] Visit Information Date & Time Provider Department Dept. Phone Encounter #  
 7/25/2018  9:50 AM Cata Quach, [address] [place] [address] Diabetes and Endocrinology [id number] Follow-up Instructions Return in about 3 months (around 10/25/2018). Your Appointments 11/19/2018  9:00 AM  
ROUTINE CARE with Tom Ott MD  
[facility] [address] [facility]) Appt Note: 6 mon f/u  
 [facility] [address] [address]  
[address] [place] [address] Upcoming Health Maintenance Date Due  
 MEDICARE YEARLY EXAM 5/18/2018 EYE EXAM RETINAL OR DILATED Q1 6/20/2018 Influenza Age 5 to Adult 8/1/2018 FOOT EXAM Q1 9/6/2018 HEMOGLOBIN A1C Q6M 10/18/2018 MICROALBUMIN Q1 1/10/2019 LIPID PANEL Q1 1/10/2019 COLONOSCOPY 5/15/2019 GLAUCOMA SCREENING Q2Y 6/20/2019 DTaP/Tdap/Td series (2 - Td) 5/28/2025 Allergies as of 7/25/2018  Review Complete On: 7/25/2018 By: Cata Quach MD  
  
 Severity Noted Reaction Type Reactions Ace Inhibitors  04/18/2014   Side Effect Cough Current Immunizations  Reviewed on 5/17/2017 Name Date Influenza Vaccine 11/26/2014 11:24 AM, 9/4/2013 Influenza Vaccine Zara Miu) 11/23/2015 Influenza Vaccine (Quad) PF 11/16/2017  9:40 AM, 11/17/2016 Pneumococcal Conjugate (PCV-13) 11/17/2016 Pneumococcal Polysaccharide (PPSV-23) 5/16/2018  9:45 AM  
 Tdap 5/28/2015 Not reviewed this visit You Were Diagnosed With   
  
 Codes Comments Type 2 diabetes with nephropathy (HCC)    -  Primary ICD-10-CM: E11.21 
ICD-9-CM: 250.40, 583.81 Essential hypertension     ICD-10-CM: I10 
ICD-9-CM: 401.9  BMI 40.0-44.9, adult [facility])     ICD-10-CM: C78.27 
 ICD-9-CM: V85.41 CKD (chronic kidney disease) stage 3, GFR 30-59 ml/min     ICD-10-CM: N18.3 ICD-9-CM: 225. 3 Vitals BP Pulse Resp Height(growth percentile) Weight(growth percentile) SpO2  
 156/76 (BP 1 Location: Left arm, BP Patient Position: Sitting) 85 15 5' 7\" (1.702 m) 277 lb 3.2 oz (125.7 kg) 91% BMI Smoking Status 43.42 kg/m2 Former Smoker Vitals History BMI and BSA Data Body Mass Index Body Surface Area  
 43.42 kg/m 2 2.44 m 2 Preferred Pharmacy Pharmacy Name Phone Chata Mccullough, [facility] [phone] Your Updated Medication List  
  
   
This list is accurate as of 7/25/18 10:45 AM.  Always use your most recent med list. amLODIPine 10 mg tablet Commonly known as:  Eveline Yesenia TAKE 1 TABLET DAILY  
  
 empagliflozin 25 mg tablet Commonly known as:  Patti Ancelmo Take 1 Tab by mouth daily. glipiZIDE 5 mg tablet Commonly known as:  Key White Take 1 Tab by mouth two (2) times a day. Insulin Needles (Disposable) 32 gauge x 5/32\" Ndle Commonly known as:  PEN NEEDLE  
1 each by SubCUTAneous route daily. * UNIFINE PENTIPS PLUS 31 gauge x 1/4\" Ndle Generic drug:  Insulin Needles (Disposable) FOR USE WITH INSULIN INJECT IONS DAILY * BD ULTRA-FINE MINI PEN NEEDLE 31 gauge x 3/16\" Ndle Generic drug:  Insulin Needles (Disposable) USE WITH INSULIN INJECTIONS DAILY losartan 50 mg tablet Commonly known as:  COZAAR  
TAKE 1 TABLET TWICE A DAY  
  
 metFORMIN 1,000 mg tablet Commonly known as:  GLUCOPHAGE  
TAKE 1 TABLET TWICE A DAY WITH MEALS  
  
 miscellaneous medical supply Misc Order for diabetic shoes and inserts. ONETOUCH VERIO strip Generic drug:  glucose blood VI test strips Monitor daily  
  
 semaglutide 0.25 mg/0.2 mL (2 mg/1.5 mL) sub-q pen Commonly known as:  Edi Perez  
 0.5 mg by SubCUTAneous route every seven (7) days. Replaces Victoza * Notice: This list has 2 medication(s) that are the same as other medications prescribed for you. Read the directions carefully, and ask your doctor or other care provider to review them with you. Prescriptions Sent to Pharmacy Refills  
 empagliflozin (JARDIANCE) 25 mg tablet 3 Sig: Take 1 Tab by mouth daily. Class: Normal  
 [facility] [address] [place] [address] Ph #: [phone] Route: Oral  
 semaglutide (OZEMPIC) 0.25 mg/0.2 mL (2 mg/1.5 mL) sub-q pen 3 Si.5 mg by SubCUTAneous route every seven (7) days. Replaces Victoza Class: Normal  
 Pharmacy: [address] [place] [address], [address] [place] [address] Ph #: [phone] Route: SubCUTAneous We Performed the Following COLLECTION VENOUS BLOOD,VENIPUNCTURE [id number] CPT(R)] HEMOGLOBIN A1C WITH EAG [12790 CPT(R)] LIPID PANEL [26201 CPT(R)] METABOLIC PANEL, COMPREHENSIVE [66315 CPT(R)] MICROALBUMIN, UR, RAND W/ MICROALB/CREAT RATIO [id number] CPT(R)] [place] HANDLG&/OR CONVEY OF SPEC FOR TR OFFICE TO LAB [54836 CPT(R)] Follow-up Instructions Return in about 3 months (around 10/25/2018). Patient Instructions Diabetes: 
 
Continue efforts to monitor intake of juices and carbohydrates. Carbohydrates are broken down to glucose/sugar and may lead to glucoses rising dramatically after meals Medications: 
Continue metformin Victoza to 1.8 mg daily -> ozempic 0.5 mg weekly Jardiance 10 mg - > 25 mg tablet - take 1/2 tablet daily Continue glipizide 5 mg twice daily. Lower dose back down if you have values < 90 
 
Goals for blood sugars: 
Fasting  (less than 150) Lunch, dinner, bedtime -  (less than 180). Blood pressure: 
Continue  losartan to 50 mg twice daily Continue amlodipine Kidneys: 
Look stable Introducing [facility]! Dear Anisha Balderrama: Thank you for requesting a imagine account. Our records indicate that you already have an active imagine account. You can access your account anytime at [email or web address] Did you know that you can access your hospital and ER discharge instructions at any time in imagine? You can also review all of your test results from your hospital stay or ER visit. Additional Information If you have questions, please visit the Frequently Asked Questions section of the imagine website at [email or web address] Remember, imagine is NOT to be used for urgent needs. For medical emergencies, dial 911. Now available from your iPhone and Android! Please provide this summary of care documentation to your next provider. Your primary care clinician is listed as [place]. If you have any questions after today's visit, please call [phone].

## 2018-07-25 NOTE — PATIENT INSTRUCTIONS
Diabetes:    Continue efforts to monitor intake of juices and carbohydrates. Carbohydrates are broken down to glucose/sugar and may lead to glucoses rising dramatically after meals    Medications:  Continue metformin   Victoza to 1.8 mg daily -> ozempic 0.5 mg weekly  Jardiance 10 mg - > 25 mg tablet - take 1/2 tablet daily  Continue glipizide 5 mg twice daily. Lower dose back down if you have values < 90    Goals for blood sugars:  Fasting  (less than 150)  Lunch, dinner, bedtime -  (less than 180).     Blood pressure:  Continue  losartan to 50 mg twice daily  Continue amlodipine    Kidneys:  Look stable

## 2018-07-26 LAB
ALBUMIN SERPL-MCNC: 4.5 G/DL (ref 3.5–4.8)
ALBUMIN/CREAT UR: 152.3 MG/G CREAT (ref 0–30)
ALBUMIN/GLOB SERPL: 1.3 {RATIO} (ref 1.2–2.2)
ALP SERPL-CCNC: 76 IU/L (ref 39–117)
ALT SERPL-CCNC: 19 IU/L (ref 0–44)
AST SERPL-CCNC: 26 IU/L (ref 0–40)
BILIRUB SERPL-MCNC: 0.5 MG/DL (ref 0–1.2)
BUN SERPL-MCNC: 11 MG/DL (ref 8–27)
BUN/CREAT SERPL: 9 (ref 10–24)
CALCIUM SERPL-MCNC: 9 MG/DL (ref 8.6–10.2)
CHLORIDE SERPL-SCNC: 105 MMOL/L (ref 96–106)
CHOLEST SERPL-MCNC: 161 MG/DL (ref 100–199)
CO2 SERPL-SCNC: 23 MMOL/L (ref 20–29)
CREAT SERPL-MCNC: 1.27 MG/DL (ref 0.76–1.27)
CREAT UR-MCNC: 80.3 MG/DL
EST. AVERAGE GLUCOSE BLD GHB EST-MCNC: 226 MG/DL
GLOBULIN SER CALC-MCNC: 3.5 G/DL (ref 1.5–4.5)
GLUCOSE SERPL-MCNC: 81 MG/DL (ref 65–99)
HBA1C MFR BLD: 9.5 % (ref 4.8–5.6)
HDLC SERPL-MCNC: 55 MG/DL
INTERPRETATION, 910389: NORMAL
INTERPRETATION: NORMAL
LDLC SERPL CALC-MCNC: 84 MG/DL (ref 0–99)
Lab: NORMAL
MICROALBUMIN UR-MCNC: 122.3 UG/ML
PDF IMAGE, 910387: NORMAL
POTASSIUM SERPL-SCNC: 3.7 MMOL/L (ref 3.5–5.2)
PROT SERPL-MCNC: 8 G/DL (ref 6–8.5)
SODIUM SERPL-SCNC: 144 MMOL/L (ref 134–144)
TRIGL SERPL-MCNC: 112 MG/DL (ref 0–149)
VLDLC SERPL CALC-MCNC: 22 MG/DL (ref 5–40)

## 2018-08-01 RX ORDER — METFORMIN HYDROCHLORIDE 1000 MG/1
TABLET ORAL
Qty: 180 TAB | Refills: 3 | Status: SHIPPED | OUTPATIENT
Start: 2018-08-01 | End: 2019-09-19 | Stop reason: SDUPTHER

## 2018-08-03 RX ORDER — AMLODIPINE BESYLATE 10 MG/1
TABLET ORAL
Qty: 90 TAB | Refills: 1 | Status: SHIPPED | OUTPATIENT
Start: 2018-08-03 | End: 2019-02-06 | Stop reason: SDUPTHER

## 2018-10-24 ENCOUNTER — OFFICE VISIT (OUTPATIENT)
Dept: ENDOCRINOLOGY | Age: 73
End: 2018-10-24

## 2018-10-24 VITALS
WEIGHT: 274.2 LBS | OXYGEN SATURATION: 95 % | SYSTOLIC BLOOD PRESSURE: 132 MMHG | RESPIRATION RATE: 16 BRPM | HEART RATE: 77 BPM | BODY MASS INDEX: 43.03 KG/M2 | DIASTOLIC BLOOD PRESSURE: 80 MMHG | HEIGHT: 67 IN

## 2018-10-24 DIAGNOSIS — N52.9 ERECTILE DYSFUNCTION, UNSPECIFIED ERECTILE DYSFUNCTION TYPE: ICD-10-CM

## 2018-10-24 DIAGNOSIS — E11.21 TYPE 2 DIABETES WITH NEPHROPATHY (HCC): Primary | ICD-10-CM

## 2018-10-24 DIAGNOSIS — L85.3 DRY SKIN: ICD-10-CM

## 2018-10-24 DIAGNOSIS — I10 ESSENTIAL HYPERTENSION: ICD-10-CM

## 2018-10-24 RX ORDER — SILDENAFIL 50 MG/1
50 TABLET, FILM COATED ORAL AS NEEDED
Qty: 30 TAB | Refills: 10 | Status: SHIPPED | OUTPATIENT
Start: 2018-10-24

## 2018-10-24 RX ORDER — SILDENAFIL CITRATE 20 MG/1
TABLET ORAL
Qty: 30 TAB | Refills: 10 | Status: SHIPPED | OUTPATIENT
Start: 2018-10-24 | End: 2019-07-03

## 2018-10-24 NOTE — PATIENT INSTRUCTIONS
Diabetes: 
 
Continue efforts to monitor intake of juices and carbohydrates. Carbohydrates are broken down to glucose/sugar and may lead to glucoses rising dramatically after meals Medications: 
Continue metformin Continue Ozempic 0.5 mg weekly. ** notify me when you need refill and we'll increase the Ozempic to 1.0 mg dose Jardiance 10 mg - > 25 mg tablet - take 1/2 tablet daily Continue glipizide 5 mg twice daily. Lower dose back down if you have values < 90 
 
** Consider changing glipizide to generic for Starlix or Prandin, which work at meals. You would take these prior to eating. Goals for blood sugars: 
Fasting  (less than 150) Lunch, dinner, bedtime -  (less than 180). Blood pressure: 
Continue  losartan to 50 mg twice daily Continue amlodipine Kidneys: 
Look stable Dry skin on eyebrows, nose and scalp I think this is seborrheic dermatitis Recommend Nizoral - twice weekly. Apply and leave on for 2 minutes, then rinse off ED: 
Sildenafil 50 mg tablets - take on empty stomach 1 hour prior to sexual activity. Works for up to 6 hours Alternative: Sildenafil 20 mg - take 2-3 tablets (40-60 mg) as above.

## 2018-10-24 NOTE — PROGRESS NOTES
History of Present Illness: Cheyenne Flynn is a 74 y.o. male presents for follow-up of diabetes. Also has HTN. + microalbuminuria (improved 2015). Has CKD -overall trend has been stable Continues having knee problems Current diabetes regimen: 
Metformin 1 g BID Ozempic 0.5 mg weekly. Glipizide 2.5 mg BID Jardiance 10 mg daily. Glucoses: 
Glucoses tend to be highest after meals. He does report some values that are low, such as around the 70s. Diet:  
Feels he needs to eat dinner earlier. May be eating smaller portions. Weight down about 4 lbs. HTN:  Taking amlodipine and losartan. Kidneys: has been variable, but overall stable Lipids - controlled without statin. He prefers not to take He reports having a rash on his eyebrows, scalp, and also over the nasal creases. This is mildly pruritic and can be erythematous. Also asks about erectile dysfunction. He is having off and on problems with this. Social:lives with wife ( who has been dx with pre-diabetes) and teenage grandson - [place] NFL football fan - likes [employer], [employer], Flakita Fiore, [employer] . Past Medical History:  
Diagnosis Date  Arthritis   
 knee arthritis  Diabetes mellitus ([facility]  Hearing loss Has seen Dr. Dina Mustafa  Hypertension  Obesity Current Outpatient Medications Medication Sig  
 amLODIPine (NORVASC) 10 mg tablet TAKE 1 TABLET DAILY  metFORMIN (GLUCOPHAGE) 1,000 mg tablet TAKE 1 TABLET TWICE A DAY WITH MEALS  empagliflozin (JARDIANCE) 25 mg tablet Take 1 Tab by mouth daily.  semaglutide (OZEMPIC) 0.25 mg/0.2 mL (2 mg/1.5 mL) sub-q pen 0.5 mg by SubCUTAneous route every seven (7) days. Replaces Victoza  flash glucose sensor (FREESTYLE ANN SENSOR) kit 3 sensors per month  BD ULTRA-FINE MINI PEN NEEDLE 31 gauge x 3/16\" ndle USE WITH INSULIN INJECTIONS DAILY  miscellaneous medical supply misc Order for diabetic shoes and inserts.  losartan (COZAAR) 50 mg tablet TAKE 1 TABLET TWICE A DAY  glipiZIDE (GLUCOTROL) 5 mg tablet Take 1 Tab by mouth two (2) times a day. (Patient taking differently: Take 2.5 mg by mouth two (2) times a day.)  ONETOUCH VERIO strip Monitor daily  UNIFINE PENTIPS PLUS 31 gauge x 1/4\" ndle FOR USE WITH INSULIN INJECT IONS DAILY  Insulin Needles, Disposable, (PEN NEEDLE) 32 x 5/32 \" ndle 1 each by SubCUTAneous route daily. No current facility-administered medications for this visit. Allergies Allergen Reactions  Ace Inhibitors Cough Review of Systems: - Eyes: no blurry vision or double vision - Cardiovascular: no chest pain - Respiratory: no shortness of breath - Musculoskeletal: no myalgias - Neurological: no numbness/tingling in extremities Physical Examination: 
Visit Vitals /82 (BP 1 Location: Left arm, BP Patient Position: Sitting) Pulse 77 Resp 16 Ht 5' 7\" (1.702 m) Wt 274 lb 3.2 oz (124.4 kg) SpO2 95% BMI 42.95 kg/m²  
-  
- General: pleasant, no distress, normal gait HEENT: hearing intact, EOMI, clear sclera without icterus - Cardiovascular: regular, normal rate - Respiratory: normal effort - Integumentary: no edema. Mild erythema around nasal fold and faintly around eyebrows. Diabetic foot exam:  
 
Left Foot: 
 Visual Exam: normal  
 Pulse DP: 2+ (normal) Filament test: normal sensation  
  
-  
- Psychiatric: normal mood and affect Data Reviewed:  
Component Latest Ref Rng & Units 7/25/2018 7/25/2018 7/25/2018 7/25/2018 11:09 AM 11:09 AM 11:09 AM 11:09 AM  
Glucose 65 - 99 mg/dL    81 BUN 
    8 - 27 mg/dL    11 Creatinine 
    0.76 - 1.27 mg/dL    1.27 GFR est non-AA 
    >59 mL/min/1.73    55 (L)  
GFR est AA 
    >59 mL/min/1.73    63 BUN/Creatinine ratio 10 - 24    9 (L) Sodium 134 - 144 mmol/L    144 Potassium 3.5 - 5.2 mmol/L    3.7 Chloride 96 - 106 mmol/L    105 CO2 20 - 29 mmol/L    23 Calcium 8.6 - 10.2 mg/dL    9.0 Protein, total 
    6.0 - 8.5 g/dL    8.0 Albumin 3.5 - 4.8 g/dL    4.5  
GLOBULIN, TOTAL 
    1.5 - 4.5 g/dL    3.5 A-G Ratio 1.2 - 2.2    1.3 Bilirubin, total 
    0.0 - 1.2 mg/dL    0.5 Alk. phosphatase 39 - 117 IU/L    76 AST 
    0 - 40 IU/L    26  
ALT (SGPT) 0 - 44 IU/L    19 Cholesterol, total 
    100 - 199 mg/dL  161 Triglyceride 0 - 149 mg/dL  112 HDL Cholesterol >39 mg/dL  55 VLDL, calculated 5 - 40 mg/dL  22 LDL, calculated 0 - 99 mg/dL  84 Creatinine, urine Not Estab. mg/dL 80.3 Microalbumin, urine Not Estab. ug/mL 122.3 Microalbumin/Creat. Ratio 
    0.0 - 30.0 mg/g creat 152.3 (H) Hemoglobin A1c, (calculated) 4.8 - 5.6 %   9.5 (H) Estimated average glucose 
    mg/dL   226 Assessment/Plan: 1. Type 2 diabetes with nephropathy (HCC) Encouraged continued efforts with dietary improvements. Plan on changing/increasing his epic to the 1.0 mg dose for many He will use the freestyle ike 10 days a month to help evaluate glucose trends. It may be that a mealtime medication, such as Prandin or Starlix, may work better than glipizide for him. He describes his fasting values being reasonably good. 2. Essential hypertension Continue amlodipine and losartan 3. BMI 40.0-44.9, adult ([address] 4. Dry skin Suspect seborrheic dermatitis Recommended ketoconazole shampoo twice weekly 5. Erectile dysfunction, unspecified erectile dysfunction type Prescribes sildenafilreviewed how to take this on an empty stomach, 1 hour prior to sexual activity. Prescribed the 20 mg dose as well as the 50 mg dose, based on availability. Patient Instructions Diabetes: 
 
Continue efforts to monitor intake of juices and carbohydrates.   Carbohydrates are broken down to glucose/sugar and may lead to glucoses rising dramatically after meals Medications: 
Continue metformin Continue Ozempic 0.5 mg weekly. ** notify me when you need refill and we'll increase the Ozempic to 1.0 mg dose Jardiance 10 mg - > 25 mg tablet - take 1/2 tablet daily Continue glipizide 5 mg twice daily. Lower dose back down if you have values < 90 
 
** Consider changing glipizide to generic for Starlix or Prandin, which work at meals. You would take these prior to eating. Goals for blood sugars: 
Fasting  (less than 150) Lunch, dinner, bedtime -  (less than 180). Blood pressure: 
Continue  losartan to 50 mg twice daily Continue amlodipine Kidneys: 
Look stable Dry skin on eyebrows, nose and scalp I think this is seborrheic dermatitis Recommend Nizoral - twice weekly. Apply and leave on for 2 minutes, then rinse off ED: 
Sildenafil 50 mg tablets - take on empty stomach 1 hour prior to sexual activity. Works for up to 6 hours Alternative: Sildenafil 20 mg - take 2-3 tablets (40-60 mg) as above. Follow-up Disposition: 
Return in about 3 months (around 1/24/2019). Copy sent to:

## 2018-10-24 NOTE — PROGRESS NOTES
Boubacar Hercules is a 74 y.o. male Chief Complaint Patient presents with  Follow-up  Diabetes Last A1c 9.5% 1. Have you been to the ER, urgent care clinic since your last visit? Hospitalized since your last visit? No 
 
2. Have you seen or consulted any other health care providers outside of the [address] [place] [address] since your last visit? Include any pap smears or colon screening.  No

## 2018-10-25 LAB
ALBUMIN SERPL-MCNC: 4.5 G/DL (ref 3.5–4.8)
ALBUMIN/CREAT UR: 72.6 MG/G CREAT (ref 0–30)
ALBUMIN/GLOB SERPL: 1.3 {RATIO} (ref 1.2–2.2)
ALP SERPL-CCNC: 69 IU/L (ref 39–117)
ALT SERPL-CCNC: 21 IU/L (ref 0–44)
AST SERPL-CCNC: 18 IU/L (ref 0–40)
BILIRUB SERPL-MCNC: 0.5 MG/DL (ref 0–1.2)
BUN SERPL-MCNC: 15 MG/DL (ref 8–27)
BUN/CREAT SERPL: 12 (ref 10–24)
CALCIUM SERPL-MCNC: 9.5 MG/DL (ref 8.6–10.2)
CHLORIDE SERPL-SCNC: 105 MMOL/L (ref 96–106)
CO2 SERPL-SCNC: 26 MMOL/L (ref 20–29)
CREAT SERPL-MCNC: 1.25 MG/DL (ref 0.76–1.27)
CREAT UR-MCNC: 105.7 MG/DL
EST. AVERAGE GLUCOSE BLD GHB EST-MCNC: 183 MG/DL
GLOBULIN SER CALC-MCNC: 3.5 G/DL (ref 1.5–4.5)
GLUCOSE SERPL-MCNC: 84 MG/DL (ref 65–99)
HBA1C MFR BLD: 8 % (ref 4.8–5.6)
INTERPRETATION: NORMAL
Lab: NORMAL
MICROALBUMIN UR-MCNC: 76.7 UG/ML
POTASSIUM SERPL-SCNC: 4.3 MMOL/L (ref 3.5–5.2)
PROT SERPL-MCNC: 8 G/DL (ref 6–8.5)
SODIUM SERPL-SCNC: 146 MMOL/L (ref 134–144)
TSH SERPL DL<=0.005 MIU/L-ACNC: 1.95 UIU/ML (ref 0.45–4.5)

## 2018-10-26 ENCOUNTER — TELEPHONE (OUTPATIENT)
Dept: ENDOCRINOLOGY | Age: 73
End: 2018-10-26

## 2018-10-26 NOTE — TELEPHONE ENCOUNTER
The Sathya Pereyra is not covered by pt's insurance plan. Pt must be injecting insulin at least 3 times a day and checking his bs at least 4 times a day. I called to pt to inform him but had to leave a voice message requesting a return phone call.

## 2018-11-19 ENCOUNTER — OFFICE VISIT (OUTPATIENT)
Dept: INTERNAL MEDICINE CLINIC | Age: 73
End: 2018-11-19

## 2018-11-19 VITALS
RESPIRATION RATE: 16 BRPM | HEART RATE: 88 BPM | WEIGHT: 275 LBS | DIASTOLIC BLOOD PRESSURE: 72 MMHG | BODY MASS INDEX: 43.16 KG/M2 | OXYGEN SATURATION: 97 % | HEIGHT: 67 IN | TEMPERATURE: 99 F | SYSTOLIC BLOOD PRESSURE: 127 MMHG

## 2018-11-19 DIAGNOSIS — I10 ESSENTIAL HYPERTENSION: ICD-10-CM

## 2018-11-19 DIAGNOSIS — Z00.00 MEDICARE ANNUAL WELLNESS VISIT, SUBSEQUENT: Primary | ICD-10-CM

## 2018-11-19 DIAGNOSIS — M17.12 OSTEOARTHRITIS OF LEFT KNEE, UNSPECIFIED OSTEOARTHRITIS TYPE: ICD-10-CM

## 2018-11-19 DIAGNOSIS — Z23 ENCOUNTER FOR IMMUNIZATION: ICD-10-CM

## 2018-11-19 NOTE — PROGRESS NOTES
SUBJECTIVE:  
Mr. Mis Correa is a 74 y.o. male who is here for follow up of routine medical issues. Previously he saw Dr. Candace Ya. Chief Complaint Patient presents with  Hypertension  
  pt here today for routine f.u   
 Diabetes  Immunization/Injection  
  pt wants a flu shot  Referral / Consult  
  pt has seen ENT- pt has hearing aids  Referral / Consult  
  pt has seen ortho or soreness in knee's; pt given cortisone shot He has been diabetic since about 2005. He sees Dr. Ronald Rincon as well. His BP at home is running [id number] systolic. High here today. Continues to have knee pain in L knee--\"I went to see Dr. Erwin Hirsch, and he gave me a cortisone shot and said it was bone-on-bone there. \"   
Also stiffness in AM for a little while, a few minutes (less than 15)--back, hips. \"Gets better once I get moving around. \" At this time, he is otherwise doing well and has brought no other complaints to my attention today. For a list of the medical issues addressed today, see the assessment and plan below. PMH:  
Past Medical History:  
Diagnosis Date  Arthritis   
 knee arthritis  Diabetes mellitus ([address]  Does use hearing aid  Hearing loss Has seen Dr. Katrin Macias  Hypertension  Obesity Past Surgical History:  
Procedure Laterality Date  HX CATARACT REMOVAL  ~ 2009 Bilateral  
 HX ORTHOPAEDIC Left 12/13/2013  
 shoulder  ND COLONOSCOPY FLX DX W/COLLJ SPEC WHEN PFRMD  5/15/2014 Dr. Joselyn Willingham All: He is allergic to ace inhibitors. Current Outpatient Medications Medication Sig  
 sildenafil citrate (VIAGRA) 50 mg tablet Take 1 Tab by mouth as needed.  sildenafil, antihypertensive, (REVATIO) 20 mg tablet Take 2-3 tablets daily as needed.  amLODIPine (NORVASC) 10 mg tablet TAKE 1 TABLET DAILY  metFORMIN (GLUCOPHAGE) 1,000 mg tablet TAKE 1 TABLET TWICE A DAY WITH MEALS  empagliflozin (JARDIANCE) 25 mg tablet Take 1 Tab by mouth daily.  semaglutide (OZEMPIC) 0.25 mg/0.2 mL (2 mg/1.5 mL) sub-q pen 0.5 mg by SubCUTAneous route every seven (7) days. Replaces Victoza  BD ULTRA-FINE MINI PEN NEEDLE 31 gauge x 3/16\" ndle USE WITH INSULIN INJECTIONS DAILY  miscellaneous medical supply misc Order for diabetic shoes and inserts.  losartan (COZAAR) 50 mg tablet TAKE 1 TABLET TWICE A DAY  glipiZIDE (GLUCOTROL) 5 mg tablet Take 1 Tab by mouth two (2) times a day. (Patient taking differently: Take 2.5 mg by mouth two (2) times a day.)  ONETOUCH VERIO strip Monitor daily  UNIFINE PENTIPS PLUS 31 gauge x 1/4\" ndle FOR USE WITH INSULIN INJECT IONS DAILY  Insulin Needles, Disposable, (PEN NEEDLE) 32 x 5/32 \" ndle 1 each by SubCUTAneous route daily. No current facility-administered medications for this visit. FH:  Mother had lung cancer, also had HTN. Father of  of prostate cancer age 39. His sister has had breast cancer. His family history includes Heart Disease in his paternal grandmother; Hypertension in his mother; and brain aneurysm in his maternal grandfather. His maternal grandmother  of complications of childbirth. SH: He is retired . [employer] . . He reports that he quit smoking about 48 years ago. he has never used smokeless tobacco. He reports that he does not drink alcohol or use drugs. ROS: See above; Complete ROS otherwise negative. OBJECTIVE:  
Vitals:  
Visit Vitals /72 (BP 1 Location: Left arm, BP Patient Position: Sitting) Pulse 88 Temp 99 °F (37.2 °C) (Oral) Resp 16 Ht 5' 7\" (1.702 m) Wt 275 lb (124.7 kg) SpO2 97% BMI 43.07 kg/m² Gen: Pleasant, obese 74 y.o. AA male in NAD. HEENT: PERRLA. EOMI. OP moist and pink. TM: Pearly bilaterally. Neck: Supple. No LAD. HEART: RRR, No M/G/R.    LUNGS: CTAB No W/R. ABDOMEN: S, NT, ND, BS+. EXTREMITIES: Warm.  No C/C/E.  MUSCULOSKELETAL: Normal ROM, muscle strength 5/5 all groups. NEURO: Alert and oriented x 3. Cranial nerves grossly intact. No focal sensory or motor deficits noted. SKIN: Warm. Dry. No rashes or other lesions noted. Lab Results Component Value Date/Time Sodium 146 (H) 10/24/2018 11:01 AM  
 Potassium 4.3 10/24/2018 11:01 AM  
 Chloride 105 10/24/2018 11:01 AM  
 CO2 26 10/24/2018 11:01 AM  
 Glucose 84 10/24/2018 11:01 AM  
 BUN 15 10/24/2018 11:01 AM  
 Creatinine 1.25 10/24/2018 11:01 AM  
 BUN/Creatinine ratio 12 10/24/2018 11:01 AM  
 GFR est AA 65 10/24/2018 11:01 AM  
 GFR est non-AA 56 (L) 10/24/2018 11:01 AM  
 Calcium 9.5 10/24/2018 11:01 AM  
 Bilirubin, total 0.5 10/24/2018 11:01 AM  
 ALT (SGPT) 21 10/24/2018 11:01 AM  
 AST (SGOT) 18 10/24/2018 11:01 AM  
 Alk. phosphatase 69 10/24/2018 11:01 AM  
 Protein, total 8.0 10/24/2018 11:01 AM  
 Albumin 4.5 10/24/2018 11:01 AM  
 A-G Ratio 1.3 10/24/2018 11:01 AM  
 
 
Lab Results Component Value Date/Time Cholesterol, total 161 07/25/2018 11:09 AM  
 HDL Cholesterol 55 07/25/2018 11:09 AM  
 LDL, calculated 84 07/25/2018 11:09 AM  
 Triglyceride 112 07/25/2018 11:09 AM  
 
  
Lab Results Component Value Date/Time Hemoglobin A1c 8.0 (H) 10/24/2018 11:01 AM  
 
 
Lab Results Component Value Date/Time WBC 9.6 08/11/2014 07:50 AM  
 HGB 12.7 08/11/2014 07:50 AM  
 HCT 38.7 08/11/2014 07:50 AM  
 PLATELET 301 46/60/8487 07:50 AM  
 MCV 85 08/11/2014 07:50 AM  
 
 
 
ASSESSMENT/ PLAN:  
 
1. HTN (hypertension): Up and down in the office; typically normal at home. Likely has \"white coat syndrome\". 2. Osteoarthritis: L knee pain. Inhibits ambulation at times--he has a handicap placard. Continue follow up with orthopedics. May take more tylenol (up to limit of 4000 mg / day) 3. Mild renal insufficiency: May be developing some CKD due to DM +/- HTN. For now, work on DM control and continue ARB. 4. Diabetes mellitus: Not controlled. As per Dr. Charles Roche. 5. Obesity: Diet and exercise. 6. Hearing loss: Progressive. Referred previously to Dr. Boy Calero. Follow-up Disposition: 
Return in about 6 months (around 5/19/2019) for HTN. I have reviewed the patient's medications and risks/side effects/benefits were discussed. Diagnosis(-es) explained to patient and questions answered. Literature provided where appropriate. Discussed the patient's BMI with him. The BMI follow up plan is as follows:  
 
dietary management education, guidance, and counseling 
encourage exercise 
monitor weight 
prescribed dietary intake An After Visit Summary was printed and given to the patient.

## 2018-11-19 NOTE — PROGRESS NOTES
This is the Subsequent Medicare Annual Wellness Exam, performed 12 months or more after the Initial AWV or the last Subsequent AWV I have reviewed the patient's medical history in detail and updated the computerized patient record. History Past Medical History:  
Diagnosis Date  Arthritis   
 knee arthritis  Diabetes mellitus ([address]  Does use hearing aid  Hearing loss Has seen Dr. Damon Sever  Hypertension  Obesity Past Surgical History:  
Procedure Laterality Date  HX CATARACT REMOVAL  ~ 2009 Bilateral  
 HX ORTHOPAEDIC Left 12/13/2013  
 shoulder  NE COLONOSCOPY FLX DX W/COLLJ SPEC WHEN PFRMD  5/15/2014 Dr. Jenn Yanes Current Outpatient Medications Medication Sig Dispense Refill  sildenafil citrate (VIAGRA) 50 mg tablet Take 1 Tab by mouth as needed. 30 Tab 10  
 sildenafil, antihypertensive, (REVATIO) 20 mg tablet Take 2-3 tablets daily as needed. 30 Tab 10  
 amLODIPine (NORVASC) 10 mg tablet TAKE 1 TABLET DAILY 90 Tab 1  
 metFORMIN (GLUCOPHAGE) 1,000 mg tablet TAKE 1 TABLET TWICE A DAY WITH MEALS 180 Tab 3  
 empagliflozin (JARDIANCE) 25 mg tablet Take 1 Tab by mouth daily. 90 Tab 3  
 semaglutide (OZEMPIC) 0.25 mg/0.2 mL (2 mg/1.5 mL) sub-q pen 0.5 mg by SubCUTAneous route every seven (7) days. Replaces Victoza 3 Box 3  
 BD ULTRA-FINE MINI PEN NEEDLE 31 gauge x 3/16\" ndle USE WITH INSULIN INJECTIONS DAILY 100 Pen Needle 3  
 miscellaneous medical supply misc Order for diabetic shoes and inserts. 1 Each 0  
 losartan (COZAAR) 50 mg tablet TAKE 1 TABLET TWICE A  Tab 3  
 glipiZIDE (GLUCOTROL) 5 mg tablet Take 1 Tab by mouth two (2) times a day.  (Patient taking differently: Take 2.5 mg by mouth two (2) times a day.) 180 Tab 3  
 ONETOUCH VERIO strip Monitor daily 100 Strip 3  
 UNIFINE PENTIPS PLUS 31 gauge x 1/4\" ndle FOR USE WITH INSULIN INJECT IONS DAILY 100 Each 3  
 Insulin Needles, Disposable, (PEN NEEDLE) 32 x 5/32 \" ndle 1 each by SubCUTAneous route daily. 100 each 3 Allergies Allergen Reactions  Ace Inhibitors Cough Family History Problem Relation Age of Onset  Hypertension Mother  Cancer Mother   
     lung  Cancer Father 39  
     prostate  Other Maternal Grandmother   
      as result of complications of childbirth when baby was 8 mos old  Cancer Sister   
     half sister-breast CA in remission  Other Maternal Grandfather   
     aneurysm  Heart Disease Paternal Grandmother Social History Tobacco Use  Smoking status: Former Smoker Last attempt to quit: 1970 Years since quittin.9  Smokeless tobacco: Never Used Substance Use Topics  Alcohol use: No  
 
Patient Active Problem List  
Diagnosis Code  Osteoarthritis M19.90  
 HTN (hypertension) I10  
 Diabetes mellitus ([facility] E11.9  Obesity E66.9  Mild renal insufficiency N28.9  History of colon polyps Z86.010  
 Microalbuminuria R80.9  Obesity, morbid ([facility] E66.01  
 Type 2 diabetes with nephropathy (HCC) E11.21  
 BMI 40.0-44.9, adult ([facility] Z68.41 Depression Risk Factor Screening: PHQ over the last two weeks 2018 Little interest or pleasure in doing things Not at all Feeling down, depressed, irritable, or hopeless Not at all Total Score PHQ 2 0 Reaffirms this. Alcohol Risk Factor Screening: You do not drink alcohol or very rarely. Functional Ability and Level of Safety:  
Hearing Loss The patient wears hearing aids. Activities of Daily Living The home contains: no safety equipment. Patient does total self care Fall Risk Fall Risk Assessment, last 12 mths 2018 Able to walk? Yes Fall in past 12 months? No  
 
 
Abuse Screen Patient is not abused Cognitive Screening Evaluation of Cognitive Function: 
Has your family/caregiver stated any concerns about your memory: no 
Normal 
 
Patient Care Team  
Patient Care Team: Binta Pretty MD as PCP - General (Internal Medicine) Abdullahi Peter MD (Ophthalmology) Xochitl Jones MD (Gastroenterology) Vipin Rios MD as Consulting Provider (Endocrinology) Melinda Guerrero MD (Orthopedic Surgery) Dr. Willis Phillip (Urology) Dr. Rika Young ([address]) Radha Grewal MD (Podiatry) Assessment/Plan Education and counseling provided: 
Are appropriate based on today's review and evaluation Diagnoses and all orders for this visit: 1. Essential hypertension 2. Osteoarthritis of left knee, unspecified osteoarthritis type Health Maintenance Due Topic Date Due  Shingrix Vaccine Age 50> (1 of 2) 06/30/1993  MEDICARE YEARLY EXAM  05/18/2018  Influenza Age 5 to Adult  08/01/2018  COLONOSCOPY  05/15/2019

## 2018-11-19 NOTE — ADDENDUM NOTE
Addended by: Yaneli Landaverde on: 11/19/2018 09:27 AM 
 
 Modules accepted: Orders, Level of Service, SmartSet

## 2018-11-19 NOTE — PATIENT INSTRUCTIONS
Medicare Wellness Visit, Male The best way to live healthy is to have a lifestyle where you eat a well-balanced diet, exercise regularly, limit alcohol use, and quit all forms of tobacco/nicotine, if applicable. Regular preventive services are another way to keep healthy. Preventive services (vaccines, screening tests, monitoring & exams) can help personalize your care plan, which helps you manage your own care. Screening tests can find health problems at the earliest stages, when they are easiest to treat. [address] [facility] follows the current, evidence-based guidelines published by the [facility] Ruel Jimmy ([facility]) when recommending preventive services for our patients. Because we follow these guidelines, sometimes recommendations change over time as research supports it. (For example, a prostate screening blood test is no longer routinely recommended for men with no symptoms.) Of course, you and your doctor may decide to screen more often for some diseases, based on your risk and co-morbidities (chronic disease you are already diagnosed with). Preventive services for you include: - Medicare offers their members a free annual wellness visit, which is time for you and your primary care provider to discuss and plan for your preventive service needs. Take advantage of this benefit every year! 
-All adults over age 72 should receive the recommended pneumonia vaccines. Current USPSTF guidelines recommend a series of two vaccines for the best pneumonia protection.  
-All adults should have a flu vaccine yearly and an ECG.  All adults age 61 and older should receive a shingles vaccine once in their lifetime.   
-All adults age 38-68 who are overweight should have a diabetes screening test once every three years.  
-Other screening tests & preventive services for persons with diabetes include: an eye exam to screen for diabetic retinopathy, a kidney function test, a foot exam, and stricter control over your cholesterol.  
-Cardiovascular screening for adults with routine risk involves an electrocardiogram (ECG) at intervals determined by the provider.  
-Colorectal cancer screening should be done for adults age 54-65 with no increased risk factors for colorectal cancer. There are a number of acceptable methods of screening for this type of cancer. Each test has its own benefits and drawbacks. Discuss with your provider what is most appropriate for you during your annual wellness visit. The different tests include: colonoscopy (considered the best screening method), a fecal occult blood test, a fecal DNA test, and sigmoidoscopy. 
-All adults born between [facility] should be screened once for Hepatitis C. 
-An Abdominal Aortic Aneurysm (AAA) Screening is recommended for men age 73-68 who has ever smoked in their lifetime. Here is a list of your current Health Maintenance items (your personalized list of preventive services) with a due date: 
Health Maintenance Due Topic Date Due  Shingles Vaccine (1 of 2) 06/30/1993 [address] [place] [address] Annual Well Visit  05/18/2018  Flu Vaccine  08/01/2018  Colonoscopy  05/15/2019

## 2018-11-19 NOTE — PROGRESS NOTES
1. Have you been to the ER, urgent care clinic since your last visit? Hospitalized since your last visit?no 2. Have you seen or consulted any other health care providers outside of the [address] [place] [address] since your last visit? Include any pap smears or colon screening. Ortho for knee pain; pt given cortisone shot; ENT- hearing aids

## 2018-12-13 RX ORDER — LOSARTAN POTASSIUM 50 MG/1
TABLET ORAL
Qty: 180 TAB | Refills: 3 | Status: SHIPPED | OUTPATIENT
Start: 2018-12-13 | End: 2020-01-31 | Stop reason: SDUPTHER

## 2019-01-30 ENCOUNTER — OFFICE VISIT (OUTPATIENT)
Dept: ENDOCRINOLOGY | Age: 74
End: 2019-01-30

## 2019-01-30 VITALS
DIASTOLIC BLOOD PRESSURE: 93 MMHG | HEIGHT: 67 IN | WEIGHT: 273.6 LBS | BODY MASS INDEX: 42.94 KG/M2 | SYSTOLIC BLOOD PRESSURE: 151 MMHG

## 2019-01-30 DIAGNOSIS — E11.21 TYPE 2 DIABETES WITH NEPHROPATHY (HCC): Primary | ICD-10-CM

## 2019-01-30 DIAGNOSIS — L21.9 SEBORRHEIC DERMATITIS: ICD-10-CM

## 2019-01-30 DIAGNOSIS — I10 ESSENTIAL HYPERTENSION: ICD-10-CM

## 2019-01-30 LAB — HBA1C MFR BLD HPLC: 7.7 %

## 2019-01-30 NOTE — PROGRESS NOTES
History of Present Illness: Arcelia Barrow is a 74 y.o. male presents for follow-up of diabetes. Also has HTN. + microalbuminuria (improved 2015). Has CKD -overall trend has been stable     A1c 7.7 today. This is improved and down from 9.5 in summer 2018. Current diabetes regimen:  Metformin 1 g BID  Ozempic 1.0 mg weekly. Glipizide 2.5 mg BID  Jardiance 10 mg daily. Glucoses:  Not monitoring. No hypoglycemia. Did where [facility] for 10 days. Saw glucoses spike after meals. Diet:   Overall doing better now. Was off track around holidays now. Feels Ozempic has decreased appetite. Eating smaller portions. Weight down 8 lbs over last year. Very mild decrease in weight. Exercise is limited due to arthritis. HTN:  Taking amlodipine and losartan. BP higher today. Values 125/80 at home generally. He currently has a cold and is taking cold medications. Kidneys: has been variable, but overall stable    Lipids - controlled without statin. He prefers not to take   Seborrheic dermatitis - face. Improved with ketoconazole shampoo. Social:lives with wife ( who has been dx with pre-diabetes) and teenage grandson - [place]  [employer] football fan - likes David, Samina, Flakita Fiore, Lucie . Past Medical History:   Diagnosis Date    Arthritis     knee arthritis    Diabetes mellitus ([facility]     Does use hearing aid     Hearing loss     Has seen Dr. Jens Kaplan Hypertension     Obesity      Current Outpatient Medications   Medication Sig    semaglutide (OZEMPIC) 1 mg/0.75 mL (2 mg/1.5 mL) sub-q pen 1 mg by SubCUTAneous route every seven (7) days.  losartan (COZAAR) 50 mg tablet TAKE 1 TABLET TWICE A DAY    sildenafil citrate (VIAGRA) 50 mg tablet Take 1 Tab by mouth as needed.  sildenafil, antihypertensive, (REVATIO) 20 mg tablet Take 2-3 tablets daily as needed.     amLODIPine (NORVASC) 10 mg tablet TAKE 1 TABLET DAILY    metFORMIN (GLUCOPHAGE) 1,000 mg tablet TAKE 1 TABLET TWICE A DAY WITH MEALS    empagliflozin (JARDIANCE) 25 mg tablet Take 1 Tab by mouth daily.  BD ULTRA-FINE MINI PEN NEEDLE 31 gauge x 3/16\" ndle USE WITH INSULIN INJECTIONS DAILY    miscellaneous medical supply misc Order for diabetic shoes and inserts.  glipiZIDE (GLUCOTROL) 5 mg tablet Take 1 Tab by mouth two (2) times a day. (Patient taking differently: Take 2.5 mg by mouth two (2) times a day.)    ONETOUCH VERIO strip Monitor daily    UNIFINE PENTIPS PLUS 31 gauge x 1/4\" ndle FOR USE WITH INSULIN INJECT IONS DAILY    Insulin Needles, Disposable, (PEN NEEDLE) 32 x 5/32 \" ndle 1 each by SubCUTAneous route daily. No current facility-administered medications for this visit. Allergies   Allergen Reactions    Ace Inhibitors Cough       Review of Systems:  - Eyes: no blurry vision or double vision  - Cardiovascular: no chest pain  - Respiratory: no shortness of breath  - Musculoskeletal: no myalgias  - Neurological: no numbness/tingling in extremities    Physical Examination:  Visit Vitals  BP (!) 151/93 (BP 1 Location: Left arm, BP Patient Position: Sitting)   Ht 5' 7\" (1.702 m)   Wt 273 lb 9.6 oz (124.1 kg)   BMI 42.85 kg/m²     - General: pleasant, no distress, normal gait   HEENT: hearing intact, EOMI, clear sclera without icterus  - Cardiovascular: regular, normal rate   - Respiratory: normal effort  - Integumentary: no edema  - Psychiatric: normal mood and affect    Data Reviewed:     Component      Latest Ref Rng & Units 1/30/2019 10/24/2018 10/24/2018 10/24/2018     Creatinine, urine      Not Estab. mg/dL   105.7    Microalbumin, urine      Not Estab. ug/mL   76.7    Microalbumin/Creat.  Ratio      0.0 - 30.0 mg/g creat   72.6 (H)    Hemoglobin A1c, (calculated)      4.8 - 5.6 %    8.0 (H)   Estimated average glucose      mg/dL    183   TSH      0.450 - 4.500 uIU/mL  1.950     Hemoglobin A1c (POC)      % 7.7        Component      Latest Ref Rng & Units 10/24/2018          11:01 AM Glucose      65 - 99 mg/dL 84   BUN      8 - 27 mg/dL 15   Creatinine      0.76 - 1.27 mg/dL 1.25   GFR est non-AA      >59 mL/min/1.73 56 (L)   GFR est AA      >59 mL/min/1.73 65   BUN/Creatinine ratio      10 - 24 12   Sodium      134 - 144 mmol/L 146 (H)   Potassium      3.5 - 5.2 mmol/L 4.3   Chloride      96 - 106 mmol/L 105   CO2      20 - 29 mmol/L 26   Calcium      8.6 - 10.2 mg/dL 9.5   Protein, total      6.0 - 8.5 g/dL 8.0   Albumin      3.5 - 4.8 g/dL 4.5   GLOBULIN, TOTAL      1.5 - 4.5 g/dL 3.5   A-G Ratio      1.2 - 2.2 1.3   Bilirubin, total      0.0 - 1.2 mg/dL 0.5   Alk. phosphatase      39 - 117 IU/L 69   AST      0 - 40 IU/L 18   ALT (SGPT)      0 - 44 IU/L 21     Component      Latest Ref Rng & Units 7/25/2018          11:09 AM   Cholesterol, total      100 - 199 mg/dL 161   Triglyceride      0 - 149 mg/dL 112   HDL Cholesterol      >39 mg/dL 55   VLDL, calculated      5 - 40 mg/dL 22   LDL, calculated      0 - 99 mg/dL 84       Assessment/Plan:   1. Type 2 diabetes with nephropathy (HCC)   - improving. Continue diet and weight loss efforts. Glucoses are trending in the right directions  - continue metformin, jardiance and Ozempic + glipizide  - decrease or stop glipizide for values < 90.    2. Essential hypertension   - continue losartan and amlodipine. Will follow. May be higher today due to URI and decongestant medications. - continue losartan and amlodipine  - monitor at home. 3. BMI 40.0-44.9, adult ([facility]   - trying to optimize medications for weight   Would stop sulfonylurea if he has lower values   4. Seborrheic dermatitis - good response to ketoconazole. Reviewed how salicylic facial wash may help. Patient Instructions   Diabetes:  Try to avoid juices  Monitor starch intake  Continue efforts with weight loss    Medications:  Continue metformin  Continue Ozempic 1.0 mg weekly    Jardiance 10 mg - > 25 mg tablet - take 1/2 tablet daily  Continue glipizide 5 mg twice daily. Lower dose back down if you have values < 90    Goals for blood sugars:  Fasting  (less than 150)  Lunch, dinner, bedtime -  (less than 180). Blood pressure:  Continue  losartan to 50 mg twice daily  Continue amlodipine  Follow blood pressure at home    Kidneys:  Overall stable          Follow-up Disposition:  Return in about 4 months (around 5/30/2019).     Copy sent to:

## 2019-01-30 NOTE — PATIENT INSTRUCTIONS
Diabetes:  Try to avoid juices  Monitor starch intake  Continue efforts with weight loss    Medications:  Continue metformin  Continue Ozempic 1.0 mg weekly    Jardiance 10 mg - > 25 mg tablet - take 1/2 tablet daily  Continue glipizide 5 mg twice daily. Lower dose back down if you have values < 90    Goals for blood sugars:  Fasting  (less than 150)  Lunch, dinner, bedtime -  (less than 180). Blood pressure:  Continue  losartan to 50 mg twice daily  Continue amlodipine    Kidneys:  Has been stable. Skin:  Continue ketoconazole shampoo    I think Clean and Clear - acne triple clear (has salicylic acid) - facial cleanser will help if used nightly. May not need to use ketoconazole.

## 2019-02-01 ENCOUNTER — DOCUMENTATION ONLY (OUTPATIENT)
Dept: ENDOCRINOLOGY | Age: 74
End: 2019-02-01

## 2019-02-01 NOTE — PROGRESS NOTES
Ozempic was denied to be covered by insurance. Awaiting response from Dr. Jagruti Browning to see about further treatment.

## 2019-02-07 RX ORDER — AMLODIPINE BESYLATE 10 MG/1
TABLET ORAL
Qty: 90 TAB | Refills: 1 | Status: SHIPPED | OUTPATIENT
Start: 2019-02-07 | End: 2019-08-03 | Stop reason: SDUPTHER

## 2019-02-26 RX ORDER — BLOOD SUGAR DIAGNOSTIC
STRIP MISCELLANEOUS
Qty: 100 STRIP | Refills: 3 | Status: SHIPPED | OUTPATIENT
Start: 2019-02-26 | End: 2019-02-28 | Stop reason: SDUPTHER

## 2019-02-28 DIAGNOSIS — E11.21 TYPE 2 DIABETES MELLITUS WITH DIABETIC NEPHROPATHY, WITHOUT LONG-TERM CURRENT USE OF INSULIN (HCC): Primary | ICD-10-CM

## 2019-02-28 RX ORDER — BLOOD SUGAR DIAGNOSTIC
STRIP MISCELLANEOUS
Qty: 100 STRIP | Refills: 3 | Status: SHIPPED | OUTPATIENT
Start: 2019-02-28 | End: 2020-05-11

## 2019-03-14 RX ORDER — GLIPIZIDE 5 MG/1
TABLET ORAL
Qty: 180 TAB | Refills: 3 | Status: SHIPPED | OUTPATIENT
Start: 2019-03-14 | End: 2020-01-31 | Stop reason: SDUPTHER

## 2019-03-14 NOTE — TELEPHONE ENCOUNTER
Was in 's basket. Is 's patient.   Per OV 01/30/19: - continue metformin, jardiance and Ozempic + glipizide

## 2019-05-17 LAB
ALBUMIN SERPL-MCNC: 4.1 G/DL (ref 3.5–4.8)
ALBUMIN/CREAT UR: 68.8 MG/G CREAT (ref 0–30)
ALBUMIN/GLOB SERPL: 1.2 {RATIO} (ref 1.2–2.2)
ALP SERPL-CCNC: 68 IU/L (ref 39–117)
ALT SERPL-CCNC: 22 IU/L (ref 0–44)
AST SERPL-CCNC: 20 IU/L (ref 0–40)
BILIRUB SERPL-MCNC: 0.7 MG/DL (ref 0–1.2)
BUN SERPL-MCNC: 13 MG/DL (ref 8–27)
BUN/CREAT SERPL: 10 (ref 10–24)
CALCIUM SERPL-MCNC: 9.2 MG/DL (ref 8.6–10.2)
CHLORIDE SERPL-SCNC: 107 MMOL/L (ref 96–106)
CHOLEST SERPL-MCNC: 140 MG/DL (ref 100–199)
CO2 SERPL-SCNC: 22 MMOL/L (ref 20–29)
CREAT SERPL-MCNC: 1.3 MG/DL (ref 0.76–1.27)
CREAT UR-MCNC: 82 MG/DL
EST. AVERAGE GLUCOSE BLD GHB EST-MCNC: 180 MG/DL
GLOBULIN SER CALC-MCNC: 3.4 G/DL (ref 1.5–4.5)
GLUCOSE SERPL-MCNC: 137 MG/DL (ref 65–99)
HBA1C MFR BLD: 7.9 % (ref 4.8–5.6)
HDLC SERPL-MCNC: 50 MG/DL
INTERPRETATION, 910389: NORMAL
INTERPRETATION: NORMAL
LDLC SERPL CALC-MCNC: 72 MG/DL (ref 0–99)
Lab: NORMAL
MICROALBUMIN UR-MCNC: 56.4 UG/ML
PDF IMAGE, 910387: NORMAL
POTASSIUM SERPL-SCNC: 4 MMOL/L (ref 3.5–5.2)
PROT SERPL-MCNC: 7.5 G/DL (ref 6–8.5)
SODIUM SERPL-SCNC: 144 MMOL/L (ref 134–144)
TRIGL SERPL-MCNC: 91 MG/DL (ref 0–149)
VLDLC SERPL CALC-MCNC: 18 MG/DL (ref 5–40)

## 2019-05-20 ENCOUNTER — OFFICE VISIT (OUTPATIENT)
Dept: INTERNAL MEDICINE CLINIC | Age: 74
End: 2019-05-20

## 2019-05-20 VITALS
TEMPERATURE: 97.8 F | HEART RATE: 94 BPM | BODY MASS INDEX: 42.44 KG/M2 | OXYGEN SATURATION: 96 % | WEIGHT: 270.4 LBS | HEIGHT: 67 IN | RESPIRATION RATE: 20 BRPM | DIASTOLIC BLOOD PRESSURE: 73 MMHG | SYSTOLIC BLOOD PRESSURE: 136 MMHG

## 2019-05-20 DIAGNOSIS — M17.9 OSTEOARTHRITIS OF KNEE, UNSPECIFIED LATERALITY, UNSPECIFIED OSTEOARTHRITIS TYPE: ICD-10-CM

## 2019-05-20 DIAGNOSIS — N28.9 MILD RENAL INSUFFICIENCY: ICD-10-CM

## 2019-05-20 DIAGNOSIS — I10 ESSENTIAL HYPERTENSION: Primary | ICD-10-CM

## 2019-05-20 NOTE — PROGRESS NOTES
Chief Complaint Patient presents with  Hypertension 6 month follow up 1. Have you been to the ER, urgent care clinic since your last visit? No  
 Hospitalized since your last visit? No  
 
2. Have you seen or consulted any other health care providers outside of the [address] [place] [address] since your last visit?   No

## 2019-05-20 NOTE — PROGRESS NOTES
SUBJECTIVE:  
Mr. Nasrin Portillo is a 74 y.o. male who is here for follow up of routine medical issues. Previously he saw Dr. Toney Cam. Chief Complaint Patient presents with  Hypertension 6 month follow up [employer]ne[employer]. He has been diabetic since about 2005. He sees Dr. Edie Miles as well. His BP at home is running [id number] systolic. High here today. \"My main problem is my knees. \"  L > R. Continues to have knee pain in L knee--\"I went to see Dr. Pierre Mccormick, and he gave me a cortisone shot and said it was bone-on-bone there. \"   
Also stiffness in AM for a little while, a few minutes (less than 15)--back, hips. \"Gets better once I get moving around. \" At this time, he is otherwise doing well and has brought no other complaints to my attention today. For a list of the medical issues addressed today, see the assessment and plan below. PMH:  
Past Medical History:  
Diagnosis Date  Arthritis   
 knee arthritis  Diabetes mellitus ([facility] [address]  Does use hearing aid  Hearing loss Has seen Dr. Garlin Fothergill  Hypertension  Obesity Past Surgical History:  
Procedure Laterality Date  HX CATARACT REMOVAL  ~ 2009 Bilateral  
 HX ORTHOPAEDIC Left 12/13/2013  
 shoulder  [place] COLONOSCOPY FLX DX W/COLLJ SPEC WHEN PFRMD  5/15/2014 Dr. Gina De All: He is allergic to ace inhibitors. Current Outpatient Medications Medication Sig  
 glipiZIDE (GLUCOTROL) 5 mg tablet 1/2 tab twice per day  ONETOUCH VERIO strip Monitor daily  amLODIPine (NORVASC) 10 mg tablet TAKE 1 TABLET DAILY  semaglutide (OZEMPIC) 1 mg/0.75 mL (2 mg/1.5 mL) sub-q pen 1 mg by SubCUTAneous route every seven (7) days.  losartan (COZAAR) 50 mg tablet TAKE 1 TABLET TWICE A DAY  metFORMIN (GLUCOPHAGE) 1,000 mg tablet TAKE 1 TABLET TWICE A DAY WITH MEALS  empagliflozin (JARDIANCE) 25 mg tablet Take 1 Tab by mouth daily.  BD ULTRA-FINE MINI PEN NEEDLE 31 gauge x 3/16\" ndle USE WITH INSULIN INJECTIONS DAILY  miscellaneous medical supply misc Order for diabetic shoes and inserts.  UNIFINE PENTIPS PLUS 31 gauge x 1/4\" ndle FOR USE WITH INSULIN INJECT IONS DAILY  Insulin Needles, Disposable, (PEN NEEDLE) 32 x 5/32 \" ndle 1 each by SubCUTAneous route daily.  sildenafil citrate (VIAGRA) 50 mg tablet Take 1 Tab by mouth as needed.  sildenafil, antihypertensive, (REVATIO) 20 mg tablet Take 2-3 tablets daily as needed. No current facility-administered medications for this visit. FH:  Mother had lung cancer, also had HTN. Father of  of prostate cancer age 39. His sister has had breast cancer. His family history includes Heart Disease in his paternal grandmother; Hypertension in his mother; and brain aneurysm in his maternal grandfather. His maternal grandmother  of complications of childbirth. SH: He is retired . [employer] . . He reports that he quit smoking about 49 years ago. He has never used smokeless tobacco. He reports that he does not drink alcohol or use drugs. ROS: See above; Complete ROS otherwise negative. OBJECTIVE:  
Vitals:  
Visit Vitals /73 (BP 1 Location: Right arm, BP Patient Position: Sitting) Pulse 94 Temp 97.8 °F (36.6 °C) (Oral) Resp 20 Ht 5' 7\" (1.702 m) Wt 270 lb 6.4 oz (122.7 kg) SpO2 96% BMI 42.35 kg/m² Gen: Pleasant, obese 74 y.o. AA male in [facility]. HEENT: PERRLA. EOMI. OP moist and pink. TM: Pearly bilaterally. Neck: Supple. No LAD. HEART: RRR, No M/G/R.    LUNGS: CTAB No W/R. ABDOMEN: S, NT, ND, BS+. EXTREMITIES: Warm. No C/C/E.  MUSCULOSKELETAL: Normal ROM, muscle strength 5/5 all groups. NEURO: Alert and oriented x 3. Cranial nerves grossly intact. No focal sensory or motor deficits noted. SKIN: Warm. Dry. No rashes or other lesions noted. Lab Results Component Value Date/Time Sodium 144 05/16/2019 09:42 AM  
 Potassium 4.0 05/16/2019 09:42 AM  
 Chloride 107 (H) 05/16/2019 09:42 AM  
 CO2 22 05/16/2019 09:42 AM  
 Glucose 137 (H) 05/16/2019 09:42 AM  
 BUN 13 05/16/2019 09:42 AM  
 Creatinine 1.30 (H) 05/16/2019 09:42 AM  
 BUN/Creatinine ratio 10 05/16/2019 09:42 AM  
 GFR est AA 62 05/16/2019 09:42 AM  
 GFR est non-AA 53 (L) 05/16/2019 09:42 AM  
 Calcium 9.2 05/16/2019 09:42 AM  
 Bilirubin, total 0.7 05/16/2019 09:42 AM  
 ALT (SGPT) 22 05/16/2019 09:42 AM  
 AST (SGOT) 20 05/16/2019 09:42 AM  
 Alk. phosphatase 68 05/16/2019 09:42 AM  
 Protein, total 7.5 05/16/2019 09:42 AM  
 Albumin 4.1 05/16/2019 09:42 AM  
 A-G Ratio 1.2 05/16/2019 09:42 AM  
 
 
Lab Results Component Value Date/Time Cholesterol, total 140 05/16/2019 09:42 AM  
 HDL Cholesterol 50 05/16/2019 09:42 AM  
 LDL, calculated 72 05/16/2019 09:42 AM  
 Triglyceride 91 05/16/2019 09:42 AM  
 
  
Lab Results Component Value Date/Time Hemoglobin A1c 7.9 (H) 05/16/2019 09:42 AM  
 
 
Lab Results Component Value Date/Time WBC 9.6 08/11/2014 07:50 AM  
 HGB 12.7 08/11/2014 07:50 AM  
 HCT 38.7 08/11/2014 07:50 AM  
 PLATELET 287 45/92/3875 07:50 AM  
 MCV 85 08/11/2014 07:50 AM  
 
 
 
ASSESSMENT/ PLAN:  
 
1. HTN (hypertension): Up and down in the office; typically normal at home. Likely has \"white coat syndrome\". 2. Osteoarthritis: L knee pain. Inhibits ambulation at times--he has a handicap placard. Continue follow up with orthopedics. May take more tylenol (up to limit of 4000 mg / day) 3. Mild renal insufficiency: May be developing some CKD due to DM +/- HTN. For now, work on DM control and continue ARB. 4. Diabetes mellitus: Not controlled. As per Dr. Woods. 5. Obesity: Diet and exercise. Follow-up and Dispositions · Return in about 6 months (around 11/20/2019) for HTN.  
  
 
 
I have reviewed the patient's medications and risks/side effects/benefits were discussed. Diagnosis(-es) explained to patient and questions answered. Literature provided where appropriate. Discussed the patient's BMI with him. The BMI follow up plan is as follows:  
 
dietary management education, guidance, and counseling 
encourage exercise 
monitor weight 
prescribed dietary intake An After Visit Summary was printed and given to the patient.

## 2019-05-29 ENCOUNTER — OFFICE VISIT (OUTPATIENT)
Dept: ENDOCRINOLOGY | Age: 74
End: 2019-05-29

## 2019-05-29 VITALS
WEIGHT: 271.8 LBS | SYSTOLIC BLOOD PRESSURE: 139 MMHG | HEART RATE: 72 BPM | HEIGHT: 67 IN | DIASTOLIC BLOOD PRESSURE: 79 MMHG | BODY MASS INDEX: 42.66 KG/M2

## 2019-05-29 DIAGNOSIS — E11.21 TYPE 2 DIABETES MELLITUS WITH DIABETIC NEPHROPATHY, WITHOUT LONG-TERM CURRENT USE OF INSULIN (HCC): Primary | ICD-10-CM

## 2019-05-29 DIAGNOSIS — R80.9 MICROALBUMINURIA: ICD-10-CM

## 2019-05-29 DIAGNOSIS — N28.9 MILD RENAL INSUFFICIENCY: ICD-10-CM

## 2019-05-29 DIAGNOSIS — I10 ESSENTIAL HYPERTENSION: ICD-10-CM

## 2019-05-29 NOTE — PATIENT INSTRUCTIONS
Diabetes:  Try to avoid juices and sugared beverages and decrease sweet intake  Increase exercise. Monitor starch intake  Continue efforts with weight loss    Medications:  Continue metformin  Continue Ozempic 1.0 mg weekly  Jardiance   Continue glipizide 5 mg twice daily. Lower dose down if you have values < 90    Goals for blood sugars:  Fasting  (less than 150)  Lunch, dinner, bedtime -  (less than 180). Blood pressure:  Continue  losartan to 50 mg twice daily  Continue amlodipine    Kidneys:  Has been stable.

## 2019-05-29 NOTE — PROGRESS NOTES
History of Present Illness: Nasrin Portillo is a 74 y.o. male presents for follow-up of diabetes. Also has HTN. + microalbuminuria (improved 2015). Has CKD -overall trend has been stable     A1c 7.9    Current diabetes regimen:  Metformin 1 g BID  Ozempic 1.0 mg weekly. Glipizide 2.5 mg BID  Jardiance 25 mg daily. Glucoses:  Not checking recently  2 weeks ago - 125-130 fasting. 140-160. Diet:   'downfall are sweets and carbohydrates'     Weight down about 10 lbs over last year. Exercise is limited due to arthritis - just had steroid injection. Plans to resume going to gym. HTN:  Taking amlodipine and losartan. Kidneys: has been variable, but overall stable    Lipids - controlled without statin. He prefers not to take     Social:lives with wife ( who has been dx with pre-diabetes) and teenage grandson - [place]  [facility] football fan - likes Bengals, Redskins, Flakita Fiore, [place] . Past Medical History:   Diagnosis Date    Arthritis     knee arthritis    Diabetes mellitus ([facility] [address]     Does use hearing aid     Hearing loss     Has seen Dr. Neli Interiano Hypertension     Obesity      Current Outpatient Medications   Medication Sig    glipiZIDE (GLUCOTROL) 5 mg tablet 1/2 tab twice per day    ONETOUCH VERIO strip Monitor daily    amLODIPine (NORVASC) 10 mg tablet TAKE 1 TABLET DAILY    semaglutide (OZEMPIC) 1 mg/0.75 mL (2 mg/1.5 mL) sub-q pen 1 mg by SubCUTAneous route every seven (7) days.  losartan (COZAAR) 50 mg tablet TAKE 1 TABLET TWICE A DAY    sildenafil citrate (VIAGRA) 50 mg tablet Take 1 Tab by mouth as needed.  sildenafil, antihypertensive, (REVATIO) 20 mg tablet Take 2-3 tablets daily as needed.  metFORMIN (GLUCOPHAGE) 1,000 mg tablet TAKE 1 TABLET TWICE A DAY WITH MEALS    empagliflozin (JARDIANCE) 25 mg tablet Take 1 Tab by mouth daily.     BD ULTRA-FINE MINI PEN NEEDLE 31 gauge x 3/16\" ndle USE WITH INSULIN INJECTIONS DAILY    miscellaneous medical supply [facility] Order for diabetic shoes and inserts.  UNIFINE PENTIPS PLUS 31 gauge x 1/4\" ndle FOR USE WITH INSULIN INJECT IONS DAILY    Insulin Needles, Disposable, (PEN NEEDLE) 32 x 5/32 \" ndle 1 each by SubCUTAneous route daily. No current facility-administered medications for this visit. Allergies   Allergen Reactions    Ace Inhibitors Cough     Review of Systems:  - Eyes: no blurry vision or double vision  - Cardiovascular: no chest pain  - Respiratory: no shortness of breath  - Musculoskeletal:see HPI  - Neurological: no numbness/tingling in extremities    Physical Examination:  Visit Vitals  /79 (BP 1 Location: Left arm, BP Patient Position: Sitting)   Pulse 72   Ht 5' 7\" (1.702 m)   Wt 271 lb 12.8 oz (123.3 kg)   BMI 42.57 kg/m²   -   - General: pleasant, no distress, normal gait   HEENT: hearing intact, EOMI, clear sclera without icterus  - Cardiovascular: regular, normal rate   - Respiratory: normal effort  - Integumentary: no edema  - Psychiatric: normal mood and affect    Data Reviewed:   Lab Results   Component Value Date/Time    Hemoglobin A1c 7.9 05/16/2019 09:42 AM      Lab Results   Component Value Date/Time    Sodium 144 05/16/2019 09:42 AM    Potassium 4.0 05/16/2019 09:42 AM    Creatinine 1.30 05/16/2019 09:42 AM    Microalb/Creat ratio (ug/mg creat.) 68.8 05/16/2019 09:42 AM        Lab Results   Component Value Date/Time    Cholesterol, total 140 05/16/2019 09:42 AM    HDL Cholesterol 50 05/16/2019 09:42 AM    LDL, calculated 72 05/16/2019 09:42 AM    Triglyceride 91 05/16/2019 09:42 AM      Lab Results   Component Value Date/Time    TSH 1.950 10/24/2018 11:01 AM        Assessment/Plan:   1. Type 2 diabetes mellitus with diabetic nephropathy, without long-term current use of insulin ([address]   Not controlled on multiple medications  He and I had a discussion about his beverage intake.   I suspect that if he is able to just drink water and unsweetened beverages, such as tea or coffee, over the next few months, but I suspect his glucose control would be very good and he would lose a considerable amount of weight. Continue metformin, Jardiance, Ozempic  Continue glipizide, but decrease or stop should low values occur. 2. Essential hypertension   Continue losartan and amlodipine   3. BMI 40.0-44.9, adult ([facility] [address]   Encouraged improvements to beverage intake   4.      + microalbuminuria and mild renal insufficiency: Reviewed stability over the last 6 years  Patient Instructions   Diabetes:  Try to avoid juices and decrease sweet intake  Increase exercise. Monitor starch intake  Continue efforts with weight loss    Medications:  Continue metformin  Continue Ozempic 1.0 mg weekly  Jardiance   Continue glipizide 5 mg twice daily. Lower dose down if you have values < 90    Goals for blood sugars:  Fasting  (less than 150)  Lunch, dinner, bedtime -  (less than 180). Blood pressure:  Continue  losartan to 50 mg twice daily  Continue amlodipine    Kidneys:  Has been stable. Follow-up and Dispositions    · Return in about 4 months (around 9/29/2019).

## 2019-07-07 ENCOUNTER — ANESTHESIA EVENT (OUTPATIENT)
Dept: ENDOSCOPY | Age: 74
End: 2019-07-07
Payer: MEDICARE

## 2019-07-08 ENCOUNTER — HOSPITAL ENCOUNTER (OUTPATIENT)
Age: 74
Setting detail: OUTPATIENT SURGERY
Discharge: HOME | End: 2019-07-08
Attending: INTERNAL MEDICINE | Admitting: INTERNAL MEDICINE
Payer: MEDICARE

## 2019-07-08 ENCOUNTER — ANESTHESIA (OUTPATIENT)
Dept: ENDOSCOPY | Age: 74
End: 2019-07-08
Payer: MEDICARE

## 2019-07-08 VITALS
RESPIRATION RATE: 16 BRPM | HEART RATE: 75 BPM | BODY MASS INDEX: 43.19 KG/M2 | DIASTOLIC BLOOD PRESSURE: 79 MMHG | HEIGHT: 67 IN | TEMPERATURE: 98 F | OXYGEN SATURATION: 97 % | SYSTOLIC BLOOD PRESSURE: 145 MMHG | WEIGHT: 275.19 LBS

## 2019-07-08 PROCEDURE — 76060000031 HC ANESTHESIA FIRST 0.5 HR: Performed by: INTERNAL MEDICINE

## 2019-07-08 PROCEDURE — 74011250636 HC RX REV CODE- 250/636: Performed by: INTERNAL MEDICINE

## 2019-07-08 PROCEDURE — 76040000019: Performed by: INTERNAL MEDICINE

## 2019-07-08 PROCEDURE — 74011250636 HC RX REV CODE- 250/636

## 2019-07-08 RX ORDER — FLUMAZENIL 0.1 MG/ML
0.2 INJECTION INTRAVENOUS
Status: DISCONTINUED | OUTPATIENT
Start: 2019-07-08 | End: 2019-07-08 | Stop reason: SDUPTHER

## 2019-07-08 RX ORDER — DEXTROMETHORPHAN/PSEUDOEPHED 2.5-7.5/.8
1.2 DROPS ORAL
Status: DISCONTINUED | OUTPATIENT
Start: 2019-07-08 | End: 2019-07-08 | Stop reason: HOSPADM

## 2019-07-08 RX ORDER — EPINEPHRINE 0.1 MG/ML
1 INJECTION INTRACARDIAC; INTRAVENOUS
Status: DISCONTINUED | OUTPATIENT
Start: 2019-07-08 | End: 2019-07-08 | Stop reason: SDUPTHER

## 2019-07-08 RX ORDER — EPINEPHRINE 0.1 MG/ML
1 INJECTION INTRACARDIAC; INTRAVENOUS
Status: DISCONTINUED | OUTPATIENT
Start: 2019-07-08 | End: 2019-07-08 | Stop reason: HOSPADM

## 2019-07-08 RX ORDER — LIDOCAINE HYDROCHLORIDE 20 MG/ML
INJECTION, SOLUTION EPIDURAL; INFILTRATION; INTRACAUDAL; PERINEURAL AS NEEDED
Status: DISCONTINUED | OUTPATIENT
Start: 2019-07-08 | End: 2019-07-08 | Stop reason: HOSPADM

## 2019-07-08 RX ORDER — SODIUM CHLORIDE 9 MG/ML
75 INJECTION, SOLUTION INTRAVENOUS CONTINUOUS
Status: DISCONTINUED | OUTPATIENT
Start: 2019-07-08 | End: 2019-07-08 | Stop reason: HOSPADM

## 2019-07-08 RX ORDER — NALOXONE HYDROCHLORIDE 0.4 MG/ML
0.4 INJECTION, SOLUTION INTRAMUSCULAR; INTRAVENOUS; SUBCUTANEOUS
Status: DISCONTINUED | OUTPATIENT
Start: 2019-07-08 | End: 2019-07-08 | Stop reason: SDUPTHER

## 2019-07-08 RX ORDER — SODIUM CHLORIDE 0.9 % (FLUSH) 0.9 %
5-40 SYRINGE (ML) INJECTION EVERY 8 HOURS
Status: DISCONTINUED | OUTPATIENT
Start: 2019-07-08 | End: 2019-07-08 | Stop reason: SDUPTHER

## 2019-07-08 RX ORDER — DEXTROMETHORPHAN/PSEUDOEPHED 2.5-7.5/.8
1.2 DROPS ORAL
Status: DISCONTINUED | OUTPATIENT
Start: 2019-07-08 | End: 2019-07-08 | Stop reason: SDUPTHER

## 2019-07-08 RX ORDER — NALOXONE HYDROCHLORIDE 0.4 MG/ML
0.4 INJECTION, SOLUTION INTRAMUSCULAR; INTRAVENOUS; SUBCUTANEOUS
Status: DISCONTINUED | OUTPATIENT
Start: 2019-07-08 | End: 2019-07-08 | Stop reason: HOSPADM

## 2019-07-08 RX ORDER — SODIUM CHLORIDE 0.9 % (FLUSH) 0.9 %
5-40 SYRINGE (ML) INJECTION EVERY 8 HOURS
Status: DISCONTINUED | OUTPATIENT
Start: 2019-07-08 | End: 2019-07-08 | Stop reason: HOSPADM

## 2019-07-08 RX ORDER — ATROPINE SULFATE 0.1 MG/ML
0.5 INJECTION INTRAVENOUS
Status: DISCONTINUED | OUTPATIENT
Start: 2019-07-08 | End: 2019-07-08 | Stop reason: HOSPADM

## 2019-07-08 RX ORDER — FENTANYL CITRATE 50 UG/ML
25 INJECTION, SOLUTION INTRAMUSCULAR; INTRAVENOUS
Status: DISCONTINUED | OUTPATIENT
Start: 2019-07-08 | End: 2019-07-08 | Stop reason: HOSPADM

## 2019-07-08 RX ORDER — PROPOFOL 10 MG/ML
INJECTION, EMULSION INTRAVENOUS AS NEEDED
Status: DISCONTINUED | OUTPATIENT
Start: 2019-07-08 | End: 2019-07-08 | Stop reason: HOSPADM

## 2019-07-08 RX ORDER — ATROPINE SULFATE 0.1 MG/ML
0.5 INJECTION INTRAVENOUS
Status: DISCONTINUED | OUTPATIENT
Start: 2019-07-08 | End: 2019-07-08 | Stop reason: SDUPTHER

## 2019-07-08 RX ORDER — SODIUM CHLORIDE 0.9 % (FLUSH) 0.9 %
5-40 SYRINGE (ML) INJECTION AS NEEDED
Status: DISCONTINUED | OUTPATIENT
Start: 2019-07-08 | End: 2019-07-08 | Stop reason: HOSPADM

## 2019-07-08 RX ORDER — MIDAZOLAM HYDROCHLORIDE 1 MG/ML
.25-5 INJECTION, SOLUTION INTRAMUSCULAR; INTRAVENOUS
Status: DISCONTINUED | OUTPATIENT
Start: 2019-07-08 | End: 2019-07-08 | Stop reason: HOSPADM

## 2019-07-08 RX ORDER — SODIUM CHLORIDE 0.9 % (FLUSH) 0.9 %
5-40 SYRINGE (ML) INJECTION AS NEEDED
Status: DISCONTINUED | OUTPATIENT
Start: 2019-07-08 | End: 2019-07-08 | Stop reason: SDUPTHER

## 2019-07-08 RX ORDER — FLUMAZENIL 0.1 MG/ML
0.2 INJECTION INTRAVENOUS
Status: DISCONTINUED | OUTPATIENT
Start: 2019-07-08 | End: 2019-07-08 | Stop reason: HOSPADM

## 2019-07-08 RX ADMIN — SODIUM CHLORIDE 75 ML/HR: 900 INJECTION, SOLUTION INTRAVENOUS at 07:35

## 2019-07-08 RX ADMIN — PROPOFOL 80 MG: 10 INJECTION, EMULSION INTRAVENOUS at 07:55

## 2019-07-08 RX ADMIN — LIDOCAINE HYDROCHLORIDE 40 MG: 20 INJECTION, SOLUTION EPIDURAL; INFILTRATION; INTRACAUDAL; PERINEURAL at 07:46

## 2019-07-08 RX ADMIN — PROPOFOL 100 MG: 10 INJECTION, EMULSION INTRAVENOUS at 07:46

## 2019-07-08 NOTE — PROCEDURES
NAME:  Anders Nevarez   :   1945   MRN:   [id number]     Date/Time:  2019 8:06 AM    Colonoscopy Operative Report    Procedure Type:   Colonoscopy --screening     Indications:     Personal history of colon polyps (screening only)  Pre-operative Diagnosis: see indication above  Post-operative Diagnosis:  See findings below  :  Yonathan Christine MD  Referring Provider: Cindy Fagan MD    Exam:  Airway: clear, no airway problems anticipated  Heart: RRR, without gallops or rubs  Lungs: clear bilaterally without wheezes, crackles, or rhonchi  Abdomen: soft, nontender, nondistended, bowel sounds present  Mental Status: awake, alert and oriented to person, place and time    Sedation:  MAC anesthesia Propofol 350mg IV  Procedure Details:  After informed consent was obtained with all risks and benefits of procedure explained and preoperative exam completed, the patient was taken to the endoscopy suite and placed in the left lateral decubitus position. Upon sequential sedation as per above, a digital rectal exam was performed demonstrating no hemorrhoids. The Olympus videocolonoscope  was inserted in the rectum and carefully advanced to the cecum, which was identified by the ileocecal valve and appendiceal orifice. The quality of preparation was adequate. The colonoscope was slowly withdrawn with careful evaluation between folds. Retroflexion in the rectum was completed demonstrating no hemorrhoids. Findings:     -Normal colon mucosa without masses, polyps, or inflammation    Specimen Removed:  None. Complications: None. EBL:  None. Impression:    -Normal colon mucosa without masses, polyps, or inflammation    Recommendations: --No further colonoscopy indicated. High fiber diet. Resume normal medication(s). Discharge Disposition:  Home in the company of a  when able to ambulate.     Yonathan Christine MD

## 2019-07-08 NOTE — ANESTHESIA POSTPROCEDURE EVALUATION
Procedure(s):  COLONOSCOPY.    total IV anesthesia    Anesthesia Post Evaluation        Patient location during evaluation: PACU  Note status: Adequate. Level of consciousness: responsive to verbal stimuli and sleepy but conscious  Pain management: satisfactory to patient  Airway patency: patent  Anesthetic complications: no  Cardiovascular status: acceptable  Respiratory status: acceptable  Hydration status: acceptable  Comments: +Post-Anesthesia Evaluation and Assessment    Patient: Julian Lima MRN: [id number]  SSN: [id number]   YOB: 1945  Age: 66 y.o. Sex: male      Cardiovascular Function/Vital Signs    /72   Pulse 86   Temp 36.7 °C (98 °F)   Resp 16   Ht 5' 7\" (1.702 m)   Wt 124.8 kg (275 lb 3 oz)   SpO2 98%   BMI 43.10 kg/m²     Patient is status post Procedure(s):  COLONOSCOPY. Nausea/Vomiting: Controlled. Postoperative hydration reviewed and adequate. Pain:  Pain Scale 1: Numeric (0 - 10) (07/08/19 1812)  Pain Intensity 1: 0 (07/08/19 5494)   Managed. Neurological Status: At baseline. Mental Status and Level of Consciousness: Arousable. Pulmonary Status:   O2 Device: Room air (07/08/19 4120)   Adequate oxygenation and airway patent. Complications related to anesthesia: None    Post-anesthesia assessment completed. No concerns. Signed By: Daniel Sandhu MD    7/8/2019  Post anesthesia nausea and vomiting:  controlled      Vitals Value Taken Time   /72 7/8/2019  8:09 AM   Temp     Pulse 84 7/8/2019  8:11 AM   Resp 14 7/8/2019  8:11 AM   SpO2 97 % 7/8/2019  8:11 AM   Vitals shown include unvalidated device data.

## 2019-07-08 NOTE — ANESTHESIA PREPROCEDURE EVALUATION
Anesthetic History   No history of anesthetic complications            Review of Systems / Medical History  Patient summary reviewed, nursing notes reviewed and pertinent labs reviewed    Pulmonary          Smoker      Comments: Former 5 pack year smoker   Neuro/Psych   Within defined limits           Cardiovascular    Hypertension: well controlled              Exercise tolerance: <4 METS     GI/Hepatic/Renal         Renal disease: CRI      Comments: History of polyps Endo/Other    Diabetes: well controlled, type 2    Morbid obesity and arthritis     Other Findings   Comments: History of colon polyps         Physical Exam    Airway  Mallampati: III  TM Distance: > 6 cm  Neck ROM: normal range of motion   Mouth opening: Normal     Cardiovascular  Regular rate and rhythm,  S1 and S2 normal,  no murmur, click, rub, or gallop  Rhythm: regular  Rate: normal         Dental    Dentition: Poor dentition and Bridges     Pulmonary      Decreased breath sounds: bibasilar           Abdominal  GI exam deferred       Other Findings            Anesthetic Plan    ASA: 3  Anesthesia type: total IV anesthesia          Induction: Intravenous  Anesthetic plan and risks discussed with: Patient

## 2019-07-08 NOTE — H&P
Gastroenterology Outpatient History and Physical    Patient: Aelxia Prado    Physician: Jennifer Chavarria MD    Chief Complaint: pers hx colon polyps  History of Present Illness: 74yo M with pers hx colon polyps.   Last colonoscopy 5/15/14 with small int hemorrhoids only    History:  Past Medical History:   Diagnosis Date    Arthritis     knees, feet, hands    Diabetes mellitus ([facility] [address]     Does use hearing aid     Hearing loss     Has seen Dr. Paul Offer Hypertension     Obesity       Past Surgical History:   Procedure Laterality Date    HX CATARACT REMOVAL Bilateral ~     Bilateral    HX ROTATOR CUFF REPAIR Left 2013    shoulder    HX TONSILLECTOMY  89    as a child    [place] COLONOSCOPY FLX DX W/COLLJ SPEC WHEN PFRMD  5/15/2014    Dr. Brandy Ornelas History     Socioeconomic History    Marital status:      Spouse name: Not on file    Number of children: Not on file    Years of education: Not on file    Highest education level: Not on file   Tobacco Use    Smoking status: Former Smoker     Packs/day: 1.00     Years: 5.00     Pack years: 5.00     Last attempt to quit: 1970     Years since quittin.5    Smokeless tobacco: Never Used   Substance and Sexual Activity    Alcohol use: No    Drug use: No    Sexual activity: Yes     Partners: Female      Family History   Problem Relation Age of Onset    Hypertension Mother     Cancer Mother         lung    Cancer Father 39        prostate    Other Maternal Grandmother          as result of complications of childbirth when baby was 8 mos old   Anna Specter Cancer Sister         half sister-breast CA in remission    Other Maternal Grandfather         aneurysm    Heart Disease Paternal Grandmother       Patient Active Problem List   Diagnosis Code    Osteoarthritis M19.90    HTN (hypertension) I10    Diabetes mellitus ([facility] [address] E11.9    Obesity E66.9    Mild renal insufficiency N28.9    History of colon polyps Z86.010    Microalbuminuria R80.9    Obesity, morbid ([facility] E66.01    Type 2 diabetes with nephropathy ([facility] E11.21    BMI 40.0-44.9, adult ([place] [address] Z68.41       Allergies: Allergies   Allergen Reactions    Ace Inhibitors Cough     Medications:   Prior to Admission medications    Medication Sig Start Date End Date Taking? Authorizing Provider   glipiZIDE (GLUCOTROL) 5 mg tablet 1/2 tab twice per day 3/14/19  Yes Gloria Roper MD   [facility] VERIO strip Monitor daily 2/28/19  Yes Gloria Roper MD   amLODIPine (NORVASC) 10 mg tablet TAKE 1 TABLET DAILY 2/7/19  Yes Corin Ramey MD   semaglutide (OZEMPIC) 1 mg/0.75 mL (2 mg/1.5 mL) sub-q pen 1 mg by SubCUTAneous route every seven (7) days. Patient taking differently: 1 mg by SubCUTAneous route every seven (7) days. Wednesdays 1/7/19  Yes Gloria Roper MD   losartan (COZAAR) 50 mg tablet TAKE 1 TABLET TWICE A DAY 12/13/18  Yes Gloria Roper MD   metFORMIN (GLUCOPHAGE) 1,000 mg tablet TAKE 1 TABLET TWICE A DAY WITH MEALS 8/1/18  Yes Gloria Roper MD   empagliflozin (JARDIANCE) 25 mg tablet Take 1 Tab by mouth daily. Patient taking differently: Take 12.5 mg by mouth daily. 7/25/18  Yes Gloria Roper MD   BD ULTRA-FINE MINI PEN NEEDLE 31 gauge x 3/16\" ndle USE WITH INSULIN INJECTIONS DAILY 5/7/18  Yes Gloria Roper MD   miscellaneous medical supply misc Order for diabetic shoes and inserts. 10/17/17  Yes Gloria Roper MD   UNIFINE PENTIPS PLUS 31 gauge x 1/4\" ndle FOR USE WITH INSULIN INJECT IONS DAILY 12/6/15  Yes Gloria Roper MD   Insulin Needles, Disposable, (PEN NEEDLE) 32 x 5/32 \" ndle 1 each by SubCUTAneous route daily. 11/18/14  Yes Gloria Roper MD   sildenafil citrate (VIAGRA) 50 mg tablet Take 1 Tab by mouth as needed. 10/24/18   Gloria Roper MD     Physical Exam:   Vital Signs: Blood pressure 136/75, height 5' 7\" (1.702 m), weight 124.8 kg (275 lb 3 oz), SpO2 99 %.   General: well developed, well nourished   HEENT: unremarkable   Heart: regular rhythm no mumur    Lungs: clear   Abdominal:  benign   Neurological: unremarkable   Extremities: no edema     Findings/Diagnosis: pers hx colon polyps  Plan of Care/Planned Procedure: colonoscopy with conscious/deep sedation    Signed:  Martha Newsome MD 7/8/2019

## 2019-07-08 NOTE — DISCHARGE INSTRUCTIONS
Ana De Anda  [id number]  1945    COLON DISCHARGE INSTRUCTIONS  Discomfort:  Redness at IV site- apply warm compress to area; if redness or soreness persist- contact your physician  There may be a slight amount of blood passed from the rectum  Gaseous discomfort- walking, belching will help relieve any discomfort  You may not operate a vehicle for 12 hours  You may not engage in an occupation involving machinery or appliances for rest of today  You may not drink alcoholic beverages for at least 12 hours  Avoid making any critical decisions for at least 24 hour  DIET:   High fiber diet. - however -  remember your colon is empty and a heavy meal will produce gas. Avoid these foods:  vegetables, fried / greasy foods, carbonated drinks for today  MEDICATION:         ACTIVITY:  You may not resume your normal daily activities until tomorrow AM; it is recommended that you spend the remainder of the day resting -  avoid any strenuous activity. CALL M.D.   ANY SIGN OF:   Increasing pain, nausea, vomiting  Abdominal distension (swelling)  New increased bleeding (oral or rectal)  Fever (chills)  IMPRESSION:  -Normal colon mucosa without masses, polyps, or inflammation    Follow-up Instructions:   Call Dr. Chuck Cai if questions arise regarding your procedure  Telephone # [phone]  No repeat colonoscopy indicated    Srikanth Grewal MD

## 2019-08-04 RX ORDER — EMPAGLIFLOZIN 25 MG/1
TABLET, FILM COATED ORAL
Qty: 90 TAB | Refills: 3 | Status: SHIPPED | OUTPATIENT
Start: 2019-08-04 | End: 2020-01-31 | Stop reason: CLARIF

## 2019-08-04 RX ORDER — AMLODIPINE BESYLATE 10 MG/1
TABLET ORAL
Qty: 90 TAB | Refills: 1 | Status: SHIPPED | OUTPATIENT
Start: 2019-08-04 | End: 2020-03-02

## 2019-09-11 ENCOUNTER — OFFICE VISIT (OUTPATIENT)
Dept: ENDOCRINOLOGY | Age: 74
End: 2019-09-11

## 2019-09-11 VITALS
HEIGHT: 67 IN | HEART RATE: 79 BPM | WEIGHT: 271.8 LBS | DIASTOLIC BLOOD PRESSURE: 74 MMHG | SYSTOLIC BLOOD PRESSURE: 137 MMHG | BODY MASS INDEX: 42.66 KG/M2

## 2019-09-11 DIAGNOSIS — E11.21 TYPE 2 DIABETES MELLITUS WITH DIABETIC NEPHROPATHY, WITHOUT LONG-TERM CURRENT USE OF INSULIN (HCC): Primary | ICD-10-CM

## 2019-09-11 DIAGNOSIS — I10 ESSENTIAL HYPERTENSION: ICD-10-CM

## 2019-09-11 DIAGNOSIS — N18.30 CKD (CHRONIC KIDNEY DISEASE) STAGE 3, GFR 30-59 ML/MIN (HCC): ICD-10-CM

## 2019-09-11 LAB — HBA1C MFR BLD HPLC: 7.2 %

## 2019-09-11 NOTE — PATIENT INSTRUCTIONS
Diabetes:  Continue efforts to try to decrease/avoid sugared beverages/juices and sweets    Increase exercise. Monitor starch intake  Continue efforts with weight loss    Medications:  Continue metformin  Continue Ozempic 1.0 mg weekly  Jardiance   Continue glipizide 5 mg twice daily. Lower dose down if you have values < 90    Goals for blood sugars:  Fasting  (less than 150)  Lunch, dinner, bedtime -  (less than 180). Blood pressure:  Continue  losartan to 50 mg twice daily  Continue amlodipine    Kidneys:  Has been stable.

## 2019-09-11 NOTE — PROGRESS NOTES
History of Present Illness: Yanick Bryan is a 66 y.o. male presents for follow-up of diabetes. Also has HTN. + microalbuminuria (improved 2015). Has CKD -overall trend has been stable from 2012-> 2019    A1c 7.2 prior to today's visit with POC hemoglobin A1c    Current diabetes regimen:  Metformin 1 g BID  Ozempic 1.0 mg weekly. Glipizide 2.5 mg BID  Jardiance 25 mg daily. Glucoses: monitors periodically  90-100s fasting - this is when he typically monitors. Up to 160 2 hours following meal    Diet:   Trying to limit fruit juices. Dilutes juice with water  Still main area for improvement remains intake of sweets and desserts. Overall stable, but weight down about 10 lbs over last 2 years and down about 15 lbs from 5 years ago. Exercise: arthritis is limited. Goes to gym - once every 2 weeks. Was not going at all. HTN:  Taking amlodipine and losartan. Kidneys: has been variable, but overall stable over last 7 years. Lipids - controlled without statin. He prefers not to take     Social:lives with wife ( who has been dx with pre-diabetes) and teenage grandson - [place]  [employer] football fan - likes [facility], [place], [facility], [facility], Hospitals in [place] . Past Medical History:   Diagnosis Date    Arthritis     knees, feet, hands    Diabetes mellitus ([address]     Does use hearing aid     Hearing loss     Has seen Dr. Baron Cifuentes Hypertension     Obesity      Current Outpatient Medications   Medication Sig    amLODIPine (NORVASC) 10 mg tablet TAKE 1 TABLET DAILY    JARDIANCE 25 mg tablet TAKE 1 TABLET DAILY    glipiZIDE (GLUCOTROL) 5 mg tablet 1/2 tab twice per day    ONETOUCH VERIO strip Monitor daily    semaglutide (OZEMPIC) 1 mg/0.75 mL (2 mg/1.5 mL) sub-q pen 1 mg by SubCUTAneous route every seven (7) days. (Patient taking differently: 1 mg by SubCUTAneous route every seven (7) days.  Wednesdays)    losartan (COZAAR) 50 mg tablet TAKE 1 TABLET TWICE A DAY    sildenafil citrate (VIAGRA) 50 mg tablet Take 1 Tab by mouth as needed.  metFORMIN (GLUCOPHAGE) 1,000 mg tablet TAKE 1 TABLET TWICE A DAY WITH MEALS    BD ULTRA-FINE MINI PEN NEEDLE 31 gauge x 3/16\" ndle USE WITH INSULIN INJECTIONS DAILY    miscellaneous medical supply misc Order for diabetic shoes and inserts.  UNIFINE PENTIPS PLUS 31 gauge x 1/4\" ndle FOR USE WITH INSULIN INJECT IONS DAILY    Insulin Needles, Disposable, (PEN NEEDLE) 32 x 5/32 \" ndle 1 each by SubCUTAneous route daily. No current facility-administered medications for this visit. Allergies   Allergen Reactions    Ace Inhibitors Cough       Review of Systems:  - Eyes: no blurry vision or double vision  - Cardiovascular: no chest pain  - Respiratory: no shortness of breath  - Musculoskeletal: see HPI   - Neurological: no numbness/tingling in extremities    Physical Examination:  Visit Vitals  /74 (BP 1 Location: Right arm, BP Patient Position: Sitting)   Pulse 79   Ht 5' 7\" (1.702 m)   Wt 271 lb 12.8 oz (123.3 kg)   BMI 42.57 kg/m²   -   - General: pleasant, no distress, normal gait   HEENT: hearing intact, EOMI, clear sclera without icterus  - Cardiovascular: regular, normal rate   - Respiratory: normal effort  - Integumentary: no edema   Diabetic foot exam:          Right Foot:   Visual Exam: normal  + flat feet. Uses insoles.     Pulse DP: 2+ (normal)   Filament test: normal sensation        - Psychiatric: normal mood and affect    Data Reviewed:   Lab Results   Component Value Date/Time    Hemoglobin A1c 7.9 05/16/2019 09:42 AM      Lab Results   Component Value Date/Time    Sodium 144 05/16/2019 09:42 AM    Potassium 4.0 05/16/2019 09:42 AM    Creatinine 1.30 05/16/2019 09:42 AM    Microalb/Creat ratio (ug/mg creat.) 68.8 05/16/2019 09:42 AM        Lab Results   Component Value Date/Time    Cholesterol, total 140 05/16/2019 09:42 AM    HDL Cholesterol 50 05/16/2019 09:42 AM    LDL, calculated 72 05/16/2019 09:42 AM    Triglyceride 91 05/16/2019 09:42 AM      Lab Results   Component Value Date/Time    TSH 1.Juan C 10/24/2018 11:01 AM        Assessment/Plan:   1. Type 2 diabetes mellitus with diabetic nephropathy, without long-term current use of insulin ([facility])   Not at goal, but making progress  No medication changes today. Encouraged further efforts with lifestyle. Recommend he continue efforts to decrease/avoid sugary beverages and sweets  Encouraged efforts with exercise  Advised him to lower glipizide dosing and ultimately stop if he has hypoglycemia  Continue Ozempic, metformin, Jardiance   2. Essential hypertension   Come continue amlodipine and losartan   3. BMI 40.0-44.9, adult ([facility])   Encouraged limiting calories from beverages and desserts and sweets. Discussed importance of consuming bulk of calories during daytime hours and limiting calories later in the day   4. CKD (chronic kidney disease) stage 3, GFR 30-59 ml/min ([facility])   Overall very stable over the last 7 years. Continue efforts with blood pressure and glucose control     Patient Instructions   Diabetes:  Continue efforts to try to decrease/avoid sugared beverages/juices and sweets    Increase exercise. Monitor starch intake  Continue efforts with weight loss    Medications:  Continue metformin  Continue Ozempic 1.0 mg weekly  Jardiance   Continue glipizide 5 mg twice daily. Lower dose down if you have values < 90    Goals for blood sugars:  Fasting  (less than 150)  Lunch, dinner, bedtime -  (less than 180). Blood pressure:  Continue  losartan to 50 mg twice daily  Continue amlodipine    Kidneys:  Has been stable.

## 2019-09-19 RX ORDER — METFORMIN HYDROCHLORIDE 1000 MG/1
TABLET ORAL
Qty: 180 TAB | Refills: 4 | Status: SHIPPED | OUTPATIENT
Start: 2019-09-19 | End: 2020-11-11

## 2019-11-20 ENCOUNTER — OFFICE VISIT (OUTPATIENT)
Dept: INTERNAL MEDICINE CLINIC | Age: 74
End: 2019-11-20

## 2019-11-20 VITALS
TEMPERATURE: 97.7 F | HEIGHT: 67 IN | DIASTOLIC BLOOD PRESSURE: 74 MMHG | HEART RATE: 97 BPM | SYSTOLIC BLOOD PRESSURE: 140 MMHG | BODY MASS INDEX: 43.07 KG/M2 | OXYGEN SATURATION: 97 % | RESPIRATION RATE: 14 BRPM | WEIGHT: 274.4 LBS

## 2019-11-20 DIAGNOSIS — Z00.00 MEDICARE ANNUAL WELLNESS VISIT, SUBSEQUENT: Primary | ICD-10-CM

## 2019-11-20 DIAGNOSIS — I10 ESSENTIAL HYPERTENSION: ICD-10-CM

## 2019-11-20 DIAGNOSIS — M17.9 OSTEOARTHRITIS OF KNEE, UNSPECIFIED LATERALITY, UNSPECIFIED OSTEOARTHRITIS TYPE: ICD-10-CM

## 2019-11-20 DIAGNOSIS — E66.01 OBESITY, MORBID (HCC): ICD-10-CM

## 2019-11-20 DIAGNOSIS — Z23 ENCOUNTER FOR IMMUNIZATION: ICD-10-CM

## 2019-11-20 NOTE — PROGRESS NOTES
This is the Subsequent Medicare Annual Wellness Exam, performed 12 months or more after the Initial AWV or the last Subsequent AWV    I have reviewed the patient's medical history in detail and updated the computerized patient record. History     Patient Active Problem List   Diagnosis Code    Osteoarthritis M19.90    HTN (hypertension) I10    Diabetes mellitus ([facility] E11.9    Obesity E66.9    Mild renal insufficiency N28.9    History of colon polyps Z86.010    Microalbuminuria R80.9    Obesity, morbid (HCC) E66.01    Type 2 diabetes with nephropathy ([facility] E11.21    BMI 40.0-44.9, adult ([facility] Z68.41     Past Medical History:   Diagnosis Date    Arthritis     knees, feet, hands    Diabetes mellitus ([facility]     Does use hearing aid     Hearing loss     Has seen Dr. Lauren Beckham Hypertension     Obesity       Past Surgical History:   Procedure Laterality Date    COLONOSCOPY N/A 7/8/2019    [facility]COPY    COLONOSCOPY,DIAGNOSTIC  7/8/2019         HX CATARACT REMOVAL Bilateral ~ 2009    Bilateral    HX ROTATOR CUFF REPAIR Left 12/13/2013    shoulder    HX TONSILLECTOMY  1948    as a child    IN COLONOSCOPY FLX DX W/COLLJ SPEC WHEN PFRMD  5/15/2014    Dr. Nori Tejada     Current Outpatient Medications   Medication Sig Dispense Refill    metFORMIN (GLUCOPHAGE) 1,000 mg tablet TAKE 1 TABLET TWICE A DAY WITH MEALS 180 Tab 4    amLODIPine (NORVASC) 10 mg tablet TAKE 1 TABLET DAILY 90 Tab 1    JARDIANCE 25 mg tablet TAKE 1 TABLET DAILY 90 Tab 3    glipiZIDE (GLUCOTROL) 5 mg tablet 1/2 tab twice per day 180 Tab 3    ONETOUCH VERIO strip Monitor daily 100 Strip 3    semaglutide (OZEMPIC) 1 mg/0.75 mL (2 mg/1.5 mL) sub-q pen 1 mg by SubCUTAneous route every seven (7) days. (Patient taking differently: 1 mg by SubCUTAneous route every seven (7) days.  Wednesdays) 9 mL 3    losartan (COZAAR) 50 mg tablet TAKE 1 TABLET TWICE A  Tab 3    sildenafil citrate (VIAGRA) 50 mg tablet Take 1 Tab by mouth as needed. 30 Tab 10    BD ULTRA-FINE MINI PEN NEEDLE 31 gauge x 3/16\" ndle USE WITH INSULIN INJECTIONS DAILY 100 Pen Needle 3    miscellaneous medical supply misc Order for diabetic shoes and inserts. 1 Each 0    UNIFINE PENTIPS PLUS 31 gauge x 1/4\" ndle FOR USE WITH INSULIN INJECT IONS DAILY 100 Each 3    Insulin Needles, Disposable, (PEN NEEDLE) 32 x 5/32 \" ndle 1 each by SubCUTAneous route daily. 100 each 3     Allergies   Allergen Reactions    Ace Inhibitors Cough       Family History   Problem Relation Age of Onset    Hypertension Mother     Cancer Mother         lung    Cancer Father 39        prostate    Other Maternal Grandmother          as result of complications of childbirth when baby was 8 mos old   [address] [place] [address] Cancer Sister         half sister-breast CA in remission    Other Maternal Grandfather         aneurysm    Heart Disease Paternal Grandmother      Social History     Tobacco Use    Smoking status: Former Smoker     Packs/day: 1.00     Years: 5.00     Pack years: 5.00     Last attempt to quit: 1970     Years since quittin.9    Smokeless tobacco: Never Used   Substance Use Topics    Alcohol use: No       Depression Risk Factor Screening:     3 most recent PHQ Screens 2019   Little interest or pleasure in doing things Not at all   Feeling down, depressed, irritable, or hopeless Not at all   Total Score PHQ 2 0       Alcohol Risk Factor Screening (MALE > 65): Do you average more 1 drink per night or more than 7 drinks a week: No    In the past three months have you have had more than 4 drinks containing alcohol on one occasion: No      Functional Ability and Level of Safety:   Hearing: The patient wears hearing aids. Activities of Daily Living: The home contains: no safety equipment. Patient does total self care    Ambulation: with no difficulty    Fall Risk:  Fall Risk Assessment, last 12 mths 2019   Able to walk?  Yes Fall in past 12 months? No       Abuse Screen:  Patient is not abused    Cognitive Screening   Has your family/caregiver stated any concerns about your memory: no  Cognitive Screening: Normal    Patient Care Team   Patient Care Team:  Katie Santana MD as PCP - General (Internal Medicine)  Katie Santana MD as PCP - [facility]  Aida Chiu MD (Ophthalmology)  Sienna Voss MD (Gastroenterology)  Angelia Abreu MD as Consulting Provider (Endocrinology)  Paulino Kaiser MD (Orthopedic Surgery)  Dr. Nuzhat Lozano (Urology)  Dr. Nelly Holloway ([address])  Ray Peter MD (Podiatry)    Assessment/Plan   Education and counseling provided:  Are appropriate based on today's review and evaluation        Health Maintenance Due   Topic Date Due    Shingrix Vaccine Age 49> (1 of 2) 06/30/1993    Influenza Age 5 to Adult  08/01/2019    MEDICARE YEARLY EXAM  11/20/2019       Discussed the patient's BMI with him. The BMI follow up plan is as follows:     dietary management education, guidance, and counseling  encourage exercise  monitor weight  prescribed dietary intake    An After Visit Summary was printed and given to the patient.

## 2019-11-20 NOTE — PROGRESS NOTES
1. Have you been to the ER, urgent care clinic since your last visit? Hospitalized since your last visit?no    2. Have you seen or consulted any other health care providers outside of the [address] [place] [address] since your last visit? Include any pap smears or colon screening.  no

## 2019-11-20 NOTE — PROGRESS NOTES
SUBJECTIVE:   Mr. Salinas White is a 66 y.o. male who is here for follow up of routine medical issues. Previously he saw Dr. Augusto Orellana. Chief Complaint   Patient presents with    Hypertension     pt here today for a routine f.u    Diabetes    Immunization/Injection     pt wants a flu shot       He still exercises with Silver Sneakers. He has been diabetic since about 2005. He sees Dr. Vinny Marrufo as well. His BP at home is running 112K-713E systolic. High here today. \"My main problem is my knees. \"  L > R. Continues to have knee pain in L knee--\"I went to see Dr. Ana Laura Cast, and he gave me a cortisone shot and said it was bone-on-bone there. \"    Also stiffness in AM for a little while, a few minutes (less than 15)--back, hips. \"Gets better once I get moving around. \"   At this time, he is otherwise doing well and has brought no other complaints to my attention today. For a list of the medical issues addressed today, see the assessment and plan below. PMH:   Past Medical History:   Diagnosis Date    Arthritis     knees, feet, hands    Diabetes mellitus ([address]     Does use hearing aid     Hearing loss     Has seen Dr. Antonella Crockett Hypertension     Obesity        Past Surgical History:   Procedure Laterality Date    COLONOSCOPY N/A 7/8/2019    COL[facility]ae Louis [facility] at hospitals ENDOSCOPY    COLONOSCOPY,DIAGNOSTIC  7/8/2019         HX CATARACT REMOVAL Bilateral ~ 2009    Bilateral    HX ROTATOR CUFF REPAIR Left 12/13/2013    shoulder    HX TONSILLECTOMY  1948    as a child    VT COLONOSCOPY FLX DX W/COLLJ SPEC WHEN PFRMD  5/15/2014    Dr. Aravind Garnett       All: He is allergic to ace inhibitors.    Current Outpatient Medications   Medication Sig    metFORMIN (GLUCOPHAGE) 1,000 mg tablet TAKE 1 TABLET TWICE A DAY WITH MEALS    amLODIPine (NORVASC) 10 mg tablet TAKE 1 TABLET DAILY    JARDIANCE 25 mg tablet TAKE 1 TABLET DAILY    glipiZIDE (GLUCOTROL) 5 mg tablet 1/2 tab twice per day   [facility] VERIO strip Monitor daily    semaglutide (OZEMPIC) 1 mg/0.75 mL (2 mg/1.5 mL) sub-q pen 1 mg by SubCUTAneous route every seven (7) days. (Patient taking differently: 1 mg by SubCUTAneous route every seven (7) days. )    losartan (COZAAR) 50 mg tablet TAKE 1 TABLET TWICE A DAY    sildenafil citrate (VIAGRA) 50 mg tablet Take 1 Tab by mouth as needed.  BD ULTRA-FINE MINI PEN NEEDLE 31 gauge x 3/16\" ndle USE WITH INSULIN INJECTIONS DAILY    miscellaneous medical supply misc Order for diabetic shoes and inserts.  UNIFINE PENTIPS PLUS 31 gauge x 1/4\" ndle FOR USE WITH INSULIN INJECT IONS DAILY    Insulin Needles, Disposable, (PEN NEEDLE) 32 x 5/32 \" ndle 1 each by SubCUTAneous route daily. No current facility-administered medications for this visit. FH:  Mother had lung cancer, also had HTN. Father of  of prostate cancer age 39. His sister has had breast cancer. His family history includes Heart Disease in his paternal grandmother; Hypertension in his mother; and brain aneurysm in his maternal grandfather. His maternal grandmother  of complications of childbirth. SH: He is retired . [employer] . . He reports that he quit smoking about 49 years ago. He has a 5.00 pack-year smoking history. He has never used smokeless tobacco. He reports that he does not drink alcohol or use drugs. ROS: See above; Complete ROS otherwise negative. OBJECTIVE:   Vitals:   Visit Vitals  /74 (BP 1 Location: Left arm, BP Patient Position: Sitting)   Pulse 97   Temp 97.7 °F (36.5 °C) (Oral)   Resp 14   Ht 5' 7\" (1.702 m)   Wt 274 lb 6.4 oz (124.5 kg)   SpO2 97%   BMI 42.98 kg/m²      Gen: Pleasant, obese 66 y.o. AA male in NAD. HEENT: PERRLA. EOMI. OP moist and pink. TM: Pearly bilaterally. Neck: Supple. No LAD. HEART: RRR, No M/G/R.    LUNGS: CTAB No W/R. ABDOMEN: S, NT, ND, BS+. EXTREMITIES: Warm.  No C/C/E.  MUSCULOSKELETAL: Normal ROM, muscle strength 5/5 all groups. NEURO: Alert and oriented x 3. Cranial nerves grossly intact. No focal sensory or motor deficits noted. SKIN: Warm. Dry. No rashes or other lesions noted. Lab Results   Component Value Date/Time    Sodium 144 05/16/2019 09:42 AM    Potassium 4.0 05/16/2019 09:42 AM    Chloride 107 (H) 05/16/2019 09:42 AM    CO2 22 05/16/2019 09:42 AM    Glucose 137 (H) 05/16/2019 09:42 AM    BUN 13 05/16/2019 09:42 AM    Creatinine 1.30 (H) 05/16/2019 09:42 AM    BUN/Creatinine ratio 10 05/16/2019 09:42 AM    GFR est AA 62 05/16/2019 09:42 AM    GFR est non-AA 53 (L) 05/16/2019 09:42 AM    Calcium 9.2 05/16/2019 09:42 AM    Bilirubin, total 0.7 05/16/2019 09:42 AM    ALT (SGPT) 22 05/16/2019 09:42 AM    AST (SGOT) 20 05/16/2019 09:42 AM    Alk. phosphatase 68 05/16/2019 09:42 AM    Protein, total 7.5 05/16/2019 09:42 AM    Albumin 4.1 05/16/2019 09:42 AM    A-G Ratio 1.2 05/16/2019 09:42 AM       Lab Results   Component Value Date/Time    Cholesterol, total 140 05/16/2019 09:42 AM    HDL Cholesterol 50 05/16/2019 09:42 AM    LDL, calculated 72 05/16/2019 09:42 AM    Triglyceride 91 05/16/2019 09:42 AM        Lab Results   Component Value Date/Time    Hemoglobin A1c 7.9 (H) 05/16/2019 09:42 AM       Lab Results   Component Value Date/Time    WBC 9.6 08/11/2014 07:50 AM    HGB 12.7 08/11/2014 07:50 AM    HCT 38.7 08/11/2014 07:50 AM    PLATELET 678 46/67/8609 07:50 AM    MCV 85 08/11/2014 07:50 AM         ASSESSMENT/ PLAN:     1. HTN (hypertension): Up and down in the office; typically normal at home. Likely has \"white coat syndrome\". 2. Osteoarthritis: L knee pain. Inhibits ambulation at times--he has a handicap placard. Continue follow up with orthopedics. May take more tylenol (up to limit of 4000 mg / day)  3. Mild renal insufficiency: May be developing some CKD due to DM +/- HTN. For now, work on DM control and continue ARB. 4. Diabetes mellitus: Not controlled. As per Dr. Yuko Ortiz.    5. Obesity: Diet and exercise. Follow-up and Dispositions    · Return in about 6 months (around 5/20/2020) for HTN. I have reviewed the patient's medications and risks/side effects/benefits were discussed. Diagnosis(-es) explained to patient and questions answered. Literature provided where appropriate. Discussed the patient's BMI with him. The BMI follow up plan is as follows:     dietary management education, guidance, and counseling  encourage exercise  monitor weight  prescribed dietary intake    An After Visit Summary was printed and given to the patient.

## 2019-11-20 NOTE — PATIENT INSTRUCTIONS
Office Policies Phone calls/patient messages: Please allow up to 24 hours for someone in the office to contact you about your call or message. Be mindful your provider may be out of the office or your message may require further review. We encourage you to use [employer] Lifeboat for your messages as this is a faster, more efficient way to communicate with our office Medication Refills: 
         
Prescription medications require 48-72 business hours to process. We encourage you to use [employer] for your refills. For controlled medications: Please allow 72 business hours to process. Certain medications may require you to  a written prescription at our office. NO narcotic/controlled medications will be prescribed after 4pm Monday through Friday or on weekends Form/Paperwork Completion: 
         
Please note a $25 fee may incur for all paperwork for completed by our providers. We ask that you allow 7-10 business days. Pre-payment is due prior to picking up/faxing the completed form. You may also download your forms to [employer] to have your doctor print off. Medicare Wellness Visit, Male The best way to live healthy is to have a lifestyle where you eat a well-balanced diet, exercise regularly, limit alcohol use, and quit all forms of tobacco/nicotine, if applicable. Regular preventive services are another way to keep healthy. Preventive services (vaccines, screening tests, monitoring & exams) can help personalize your care plan, which helps you manage your own care. Screening tests can find health problems at the earliest stages, when they are easiest to treat. [facility] follows the current, evidence-based guidelines published by the [facility] (USPSTF) when recommending preventive services for our patients.  Because we follow these guidelines, sometimes recommendations change over time as research supports it. (For example, a prostate screening blood test is no longer routinely recommended for men with no symptoms). Of course, you and your doctor may decide to screen more often for some diseases, based on your risk and co-morbidities (chronic disease you are already diagnosed with). Preventive services for you include: - Medicare offers their members a free annual wellness visit, which is time for you and your primary care provider to discuss and plan for your preventive service needs. Take advantage of this benefit every year! 
-All adults over age 72 should receive the recommended pneumonia vaccines. Current USPSTF guidelines recommend a series of two vaccines for the best pneumonia protection.  
-All adults should have a flu vaccine yearly and tetanus vaccine every 10 years. 
-All adults age 48 and older should receive the shingles vaccines (series of two vaccines). -All adults age 38-68 who are overweight should have a diabetes screening test once every three years.  
-Other screening tests & preventive services for persons with diabetes include: an eye exam to screen for diabetic retinopathy, a kidney function test, a foot exam, and stricter control over your cholesterol.  
-Cardiovascular screening for adults with routine risk involves an electrocardiogram (ECG) at intervals determined by the provider.  
-Colorectal cancer screening should be done for adults age 54-65 with no increased risk factors for colorectal cancer. There are a number of acceptable methods of screening for this type of cancer. Each test has its own benefits and drawbacks. Discuss with your provider what is most appropriate for you during your annual wellness visit.  The different tests include: colonoscopy (considered the best screening method), a fecal occult blood test, a fecal DNA test, and sigmoidoscopy. 
-All adults born between [facility] should be screened once for Hepatitis C. 
 -An Abdominal Aortic Aneurysm (AAA) Screening is recommended for men age 73-68 who has ever smoked in their lifetime. Here is a list of your current Health Maintenance items (your personalized list of preventive services) with a due date: 
Health Maintenance Due Topic Date Due  Shingles Vaccine (1 of 2) 06/30/1993  Flu Vaccine  08/01/2019 [address] [place] [address] Annual Well Visit  11/20/2019 Body Mass Index: Care Instructions Your Care Instructions Body mass index (BMI) can help you see if your weight is raising your risk for health problems. It uses a formula to compare how much you weigh with how tall you are. · A BMI lower than 18.5 is considered underweight. · A BMI between 18.5 and 24.9 is considered healthy. · A BMI between 25 and 29.9 is considered overweight. A BMI of 30 or higher is considered obese. If your BMI is in the normal range, it means that you have a lower risk for weight-related health problems. If your BMI is in the overweight or obese range, you may be at increased risk for weight-related health problems, such as high blood pressure, heart disease, stroke, arthritis or joint pain, and diabetes. If your BMI is in the underweight range, you may be at increased risk for health problems such as fatigue, lower protection (immunity) against illness, muscle loss, bone loss, hair loss, and hormone problems. BMI is just one measure of your risk for weight-related health problems. You may be at higher risk for health problems if you are not active, you eat an unhealthy diet, or you drink too much alcohol or use tobacco products. Follow-up care is a key part of your treatment and safety. Be sure to make and go to all appointments, and call your doctor if you are having problems. It's also a good idea to know your test results and keep a list of the medicines you take. How can you care for yourself at home? · Practice healthy eating habits.  This includes eating plenty of fruits, vegetables, whole grains, lean protein, and low-fat dairy. · If your doctor recommends it, get more exercise. Walking is a good choice. Bit by bit, increase the amount you walk every day. Try for at least 30 minutes on most days of the week. · Do not smoke. Smoking can increase your risk for health problems. If you need help quitting, talk to your doctor about stop-smoking programs and medicines. These can increase your chances of quitting for good. · Limit alcohol to 2 drinks a day for men and 1 drink a day for women. Too much alcohol can cause health problems. If you have a BMI higher than 25 · Your doctor may do other tests to check your risk for weight-related health problems. This may include measuring the distance around your waist. A waist measurement of more than 40 inches in men or 35 inches in women can increase the risk of weight-related health problems. · Talk with your doctor about steps you can take to stay healthy or improve your health. You may need to make lifestyle changes to lose weight and stay healthy, such as changing your diet and getting regular exercise. If you have a BMI lower than 18.5 · Your doctor may do other tests to check your risk for health problems. · Talk with your doctor about steps you can take to stay healthy or improve your health. You may need to make lifestyle changes to gain or maintain weight and stay healthy, such as getting more healthy foods in your diet and doing exercises to build muscle. Where can you learn more? Go to http://milka-pilar.info/. Enter S176 in the search box to learn more about \"Body Mass Index: Care Instructions. \" Current as of: October 13, 2016 Content Version: 11.4 © 0793-0393 Healthwise, [employer]. Care instructions adapted under license by [employer] (which disclaims liability or warranty for this information).  If you have questions about a medical condition or this instruction, always ask your healthcare professional. [place] [address] any warranty or liability for your use of this information.

## 2020-01-14 LAB
ALBUMIN SERPL-MCNC: 4.1 G/DL (ref 3.5–4.8)
ALBUMIN/CREAT UR: 33 MG/G CREAT (ref 0–30)
ALBUMIN/GLOB SERPL: 1.2 {RATIO} (ref 1.2–2.2)
ALP SERPL-CCNC: 79 IU/L (ref 39–117)
ALT SERPL-CCNC: 21 IU/L (ref 0–44)
AST SERPL-CCNC: 16 IU/L (ref 0–40)
BILIRUB SERPL-MCNC: 1.1 MG/DL (ref 0–1.2)
BUN SERPL-MCNC: 15 MG/DL (ref 8–27)
BUN/CREAT SERPL: 12 (ref 10–24)
CALCIUM SERPL-MCNC: 9.1 MG/DL (ref 8.6–10.2)
CHLORIDE SERPL-SCNC: 103 MMOL/L (ref 96–106)
CHOLEST SERPL-MCNC: 153 MG/DL (ref 100–199)
CO2 SERPL-SCNC: 22 MMOL/L (ref 20–29)
CREAT SERPL-MCNC: 1.24 MG/DL (ref 0.76–1.27)
CREAT UR-MCNC: 102.5 MG/DL
EST. AVERAGE GLUCOSE BLD GHB EST-MCNC: 229 MG/DL
GLOBULIN SER CALC-MCNC: 3.3 G/DL (ref 1.5–4.5)
GLUCOSE SERPL-MCNC: 129 MG/DL (ref 65–99)
HBA1C MFR BLD: 9.6 % (ref 4.8–5.6)
HDLC SERPL-MCNC: 51 MG/DL
INTERPRETATION, 910389: NORMAL
INTERPRETATION: NORMAL
LDLC SERPL CALC-MCNC: 77 MG/DL (ref 0–99)
Lab: NORMAL
MICROALBUMIN UR-MCNC: 33.8 UG/ML
PDF IMAGE, 910387: NORMAL
POTASSIUM SERPL-SCNC: 4.1 MMOL/L (ref 3.5–5.2)
PROT SERPL-MCNC: 7.4 G/DL (ref 6–8.5)
SODIUM SERPL-SCNC: 143 MMOL/L (ref 134–144)
TRIGL SERPL-MCNC: 126 MG/DL (ref 0–149)
VLDLC SERPL CALC-MCNC: 25 MG/DL (ref 5–40)

## 2020-01-15 ENCOUNTER — OFFICE VISIT (OUTPATIENT)
Dept: ENDOCRINOLOGY | Age: 75
End: 2020-01-15

## 2020-01-15 VITALS
BODY MASS INDEX: 41.88 KG/M2 | HEIGHT: 67 IN | DIASTOLIC BLOOD PRESSURE: 63 MMHG | WEIGHT: 266.8 LBS | SYSTOLIC BLOOD PRESSURE: 140 MMHG | HEART RATE: 83 BPM

## 2020-01-15 DIAGNOSIS — E11.29 TYPE 2 DIABETES MELLITUS WITH MICROALBUMINURIA, WITHOUT LONG-TERM CURRENT USE OF INSULIN (HCC): Primary | ICD-10-CM

## 2020-01-15 DIAGNOSIS — N18.30 CKD (CHRONIC KIDNEY DISEASE) STAGE 3, GFR 30-59 ML/MIN (HCC): ICD-10-CM

## 2020-01-15 DIAGNOSIS — I10 ESSENTIAL HYPERTENSION: ICD-10-CM

## 2020-01-15 DIAGNOSIS — R80.9 TYPE 2 DIABETES MELLITUS WITH MICROALBUMINURIA, WITHOUT LONG-TERM CURRENT USE OF INSULIN (HCC): Primary | ICD-10-CM

## 2020-01-15 NOTE — PROGRESS NOTES
History of Present Illness: Katelin Aragon is a 66 y.o. male presents for follow-up of diabetes. Also has HTN. + microalbuminuria (improved 2015). Has CKD -overall trend has been stable from 2012-> 2019    A1c 8.9 with pre-lab A1c, up from  7.2 in fall. Current diabetes regimen:  Metformin 1 g BID  Ozempic 1.0 mg weekly. Glipizide 2.5 mg BID  Jardiance 25 mg daily. Glucoses:   No checking often  Values were higher - 150 fasting. Diet:   Diet was worse over holidays  May have worsened since he and wife took over custody of grandchildren- ages 3 and 9. Was having more sweat and pies over holidays. He will avoid these now. Weight is down about 5 lbs vs last visit. Exercise: arthritis is limiting. HTN:  Taking amlodipine and losartan. Kidneys: has been variable, but overall stable over last 7 years. Lipids - controlled without statin. He prefers not to take     Social:  lives with wife ( who has been dx with pre-diabetes) and teenage grandson   - just assumed custody of 3 and 8 yo grandchildren    [employer] football fan - likes [employer], [employer], [employer], [employer]. Of remaining 4 teams, cheering for Packers. Past Medical History:   Diagnosis Date    Arthritis     knees, feet, hands    Diabetes mellitus ([address]     Does use hearing aid     Hearing loss     Has seen Dr. Pauline Quach Hypertension     Obesity      Current Outpatient Medications   Medication Sig    semaglutide (OZEMPIC) 1 mg/dose (2 mg/1.5 mL) sub-q pen 1 mg by SubCUTAneous route every seven (7) days.     metFORMIN (GLUCOPHAGE) 1,000 mg tablet TAKE 1 TABLET TWICE A DAY WITH MEALS    amLODIPine (NORVASC) 10 mg tablet TAKE 1 TABLET DAILY    JARDIANCE 25 mg tablet TAKE 1 TABLET DAILY    glipiZIDE (GLUCOTROL) 5 mg tablet 1/2 tab twice per day    ONETOUCH VERIO strip Monitor daily    losartan (COZAAR) 50 mg tablet TAKE 1 TABLET TWICE A DAY    BD ULTRA-FINE MINI PEN NEEDLE 31 gauge x 3/16\" ndle USE WITH INSULIN INJECTIONS DAILY    miscellaneous medical supply misc Order for diabetic shoes and inserts.  UNIFINE PENTIPS PLUS 31 gauge x 1/4\" ndle FOR USE WITH INSULIN INJECT IONS DAILY    Insulin Needles, Disposable, (PEN NEEDLE) 32 x 5/32 \" ndle 1 each by SubCUTAneous route daily.  sildenafil citrate (VIAGRA) 50 mg tablet Take 1 Tab by mouth as needed. No current facility-administered medications for this visit. Allergies   Allergen Reactions    Ace Inhibitors Cough       Review of Systems:  - Eyes: no blurry vision or double vision  - Cardiovascular: no chest pain  - Respiratory: no shortness of breath  - Musculoskeletal: + arthralgias  - Neurological: no numbness/tingling in extremities    Physical Examination:  Visit Vitals  /63 (BP 1 Location: Left arm, BP Patient Position: Sitting)   Pulse 83   Ht 5' 7\" (1.702 m)   Wt 266 lb 12.8 oz (121 kg)   BMI 41.79 kg/m²   -   - General: pleasant, no distress, normal gait   HEENT: hearing intact, EOMI, clear sclera without icterus  - Cardiovascular: regular, normal rate   - Respiratory: normal effort  - Integumentary: no edema  - Psychiatric: normal mood and affect    Data Reviewed:   Lab Results   Component Value Date/Time    Hemoglobin A1c 9.6 01/13/2020 09:18 AM      Lab Results   Component Value Date/Time    Sodium 143 01/13/2020 09:18 AM    Potassium 4.1 01/13/2020 09:18 AM    Creatinine 1.24 01/13/2020 09:18 AM    Microalb/Creat ratio (ug/mg creat.) 33.0 01/13/2020 09:18 AM        Lab Results   Component Value Date/Time    Cholesterol, total 153 01/13/2020 09:18 AM    HDL Cholesterol 51 01/13/2020 09:18 AM    LDL, calculated 77 01/13/2020 09:18 AM    Triglyceride 126 01/13/2020 09:18 AM      Lab Results   Component Value Date/Time    TSH 1.950 10/24/2018 11:01 AM        Assessment/Plan:   1.  Type 2 diabetes mellitus with microalbuminuria, without long-term current use of insulin (HCC)   - not controlled   - as A1c was near goal in fall, encouraged dietary improvements and increase in monitoring  -recommended he work on increasing exercise  Avoid sugared beverages and juices    Increase glipizide to 5 mg bid  Continue Ozempic, metformin, Jardiance, pioglitazone   2. Essential hypertension   - continue amlodipine and losartan   3. BMI 40.0-44.9, adult ([address]   - encouraged smaller portions, avoidance of liquid calories. 4. CKD (chronic kidney disease) stage 3, GFR 30-59 ml/min (HCC)   - stable  - BP and diabetes control      Patient Instructions   Diabetes: Work on efforts to try to decrease/avoid sugared beverages/juices and sweets  Increase exercise. Continue efforts with weight loss    Medications:  Continue metformin  Continue Ozempic 1.0 mg weekly  Jardiance (Invokana, Farxiga, Steglatro)     Continue glipizide 5 mg twice daily. Lower dose down if you have values < 90    Goals for blood sugars:  Fasting  (less than 150)  Lunch, dinner, bedtime -  (less than 180).     Blood pressure:  Continue  losartan to 50 mg twice daily  Continue amlodipine

## 2020-01-15 NOTE — PATIENT INSTRUCTIONS
Diabetes: Work on efforts to try to decrease/avoid sugared beverages/juices and sweets Increase exercise. Continue efforts with weight loss Medications: 
Continue metformin Continue Ozempic 1.0 mg weekly Jardiance (Jose Valenzuela, [address] [place] [address]) Continue glipizide 5 mg twice daily. Lower dose down if you have values < 90 
 
Goals for blood sugars: 
Fasting  (less than 150) Lunch, dinner, bedtime -  (less than 180). Blood pressure: 
Continue  losartan to 50 mg twice daily Continue amlodipine

## 2020-01-31 ENCOUNTER — OFFICE VISIT (OUTPATIENT)
Dept: INTERNAL MEDICINE CLINIC | Age: 75
End: 2020-01-31

## 2020-01-31 VITALS
TEMPERATURE: 97.8 F | DIASTOLIC BLOOD PRESSURE: 69 MMHG | RESPIRATION RATE: 18 BRPM | HEART RATE: 80 BPM | OXYGEN SATURATION: 98 % | WEIGHT: 270 LBS | BODY MASS INDEX: 42.38 KG/M2 | HEIGHT: 67 IN | SYSTOLIC BLOOD PRESSURE: 130 MMHG

## 2020-01-31 DIAGNOSIS — E11.29 TYPE 2 DIABETES MELLITUS WITH MICROALBUMINURIA, WITHOUT LONG-TERM CURRENT USE OF INSULIN (HCC): Primary | ICD-10-CM

## 2020-01-31 DIAGNOSIS — R80.9 TYPE 2 DIABETES MELLITUS WITH MICROALBUMINURIA, WITHOUT LONG-TERM CURRENT USE OF INSULIN (HCC): Primary | ICD-10-CM

## 2020-01-31 RX ORDER — GLIPIZIDE 5 MG/1
5 TABLET ORAL 2 TIMES DAILY
Qty: 180 TAB | Refills: 3 | Status: SHIPPED | OUTPATIENT
Start: 2020-01-31 | End: 2020-02-06 | Stop reason: SDUPTHER

## 2020-01-31 RX ORDER — LOSARTAN POTASSIUM 50 MG/1
50 TABLET ORAL 2 TIMES DAILY
Qty: 180 TAB | Refills: 3 | Status: SHIPPED | OUTPATIENT
Start: 2020-01-31 | End: 2021-03-12

## 2020-01-31 NOTE — PATIENT INSTRUCTIONS
Office Policies Phone calls/patient messages: Please allow up to 24 hours for someone in the office to contact you about your call or message. Be mindful your provider may be out of the office or your message may require further review. We encourage you to use [employer]Burst for your messages as this is a faster, more efficient way to communicate with our office Medication Refills: 
         
Prescription medications require 48-72 business hours to process. We encourage you to use [employer] for your refills. For controlled medications: Please allow 72 business hours to process. Certain medications may require you to  a written prescription at our office. NO narcotic/controlled medications will be prescribed after 4pm Monday through Friday or on weekends Form/Paperwork Completion: 
         
Please note a $25 fee may incur for all paperwork for completed by our providers. We ask that you allow 7-10 business days. Pre-payment is due prior to picking up/faxing the completed form. You may also download your forms to [employer] to have your doctor print off. 
 
 
1. Have you been to the ER, urgent care clinic since your last visit? Hospitalized since your last visit?no 2. Have you seen or consulted any other health care providers outside of the [address] [place] [address] since your last visit? Include any pap smears or colon screening.  no

## 2020-01-31 NOTE — PROGRESS NOTES
SUBJECTIVE  Mr. Belgica Grider presents today acutely for     Chief Complaint   Patient presents with    Medication Refill     pt here today for medicaiton refill     Medication Evaluation     pt's insurance no longer covers jardianace      His insurance company is no longer covering jardiance. Dr. Marline Feliz has retired, and the patient needs us to help manage his DM in the meantime. Gluc running low 100s. He needs refills on glipizide, which was just increased to 5 mg bid. BP at home has been 120s / 70s. OBJECTIVE  Visit Vitals  /69 (BP 1 Location: Left arm, BP Patient Position: Sitting)   Pulse 80   Temp 97.8 °F (36.6 °C) (Oral)   Resp 18   Ht 5' 7\" (1.702 m)   Wt 270 lb (122.5 kg)   SpO2 98%   BMI 42.29 kg/m²     Gen: Pleasant 66 y.o.  male in NAD.   HEENT: PERRLA. EOMI. OP moist and pink.  Neck: Supple.  No LAD.  HEART: RRR, No M/G/R.   LUNGS: CTAB No W/R.   ABDOMEN: S, NT, ND, BS+.   EXTREMITIES: Warm. No C/C/E. MUSCULOSKELETAL: Normal ROM, muscle strength 5/5 all groups. NEURO: Alert and oriented x 3.  Cranial nerves grossly intact.  No focal sensory or motor deficits noted. SKIN: Warm. Dry. No rashes or other lesions noted. Lab Results   Component Value Date/Time    Hemoglobin A1c 9.6 (H) 01/13/2020 09:18 AM    Hemoglobin A1c (POC) 7.2 09/11/2019 11:02 AM       ASSESSMENT   Diabetes mellitus, uncontrolled. PLAN  Orders Placed This Encounter    losartan (COZAAR) 50 mg tablet     Sig: Take 1 Tab by mouth two (2) times a day. Dispense:  180 Tab     Refill:  3    dapagliflozin (FARXIGA) 10 mg tab tablet     Sig: Take 1 Tab by mouth daily. Dispense:  90 Tab     Refill:  3    glipiZIDE (GLUCOTROL) 5 mg tablet     Sig: Take 1 Tab by mouth two (2) times a day. 1/2 tab twice per day     Dispense:  180 Tab     Refill:  3       I have reviewed with the patient details of the assessment and plan and all questions were answered. Relevant patient education was performed.     Keep scheduled appt. Labs ordered.

## 2020-03-02 RX ORDER — AMLODIPINE BESYLATE 10 MG/1
TABLET ORAL
Qty: 90 TAB | Refills: 4 | Status: SHIPPED | OUTPATIENT
Start: 2020-03-02 | End: 2021-03-11 | Stop reason: SDUPTHER

## 2020-04-11 ENCOUNTER — TELEPHONE (OUTPATIENT)
Dept: INTERNAL MEDICINE CLINIC | Age: 75
End: 2020-04-11

## 2020-04-11 DIAGNOSIS — H81.10 BENIGN PAROXYSMAL POSITIONAL VERTIGO, UNSPECIFIED LATERALITY: Primary | ICD-10-CM

## 2020-04-11 RX ORDER — MECLIZINE HCL 12.5 MG 12.5 MG/1
12.5 TABLET ORAL
Qty: 30 TAB | Refills: 1 | Status: SHIPPED | OUTPATIENT
Start: 2020-04-11 | End: 2020-06-02 | Stop reason: SDUPTHER

## 2020-04-11 NOTE — TELEPHONE ENCOUNTER
On call message: Patient calls in to report waking up dizzy and now has nausea. The dizziness started after he turned his head in the bed this morning. He denies any temperature, chest pain, or sob. He reports a glucose level this morning of 128 and his blood presure was 149/72. I am sending a prescription to his pharmacy for meclizine.

## 2020-05-29 ENCOUNTER — TELEPHONE (OUTPATIENT)
Dept: INTERNAL MEDICINE CLINIC | Age: 75
End: 2020-05-29

## 2020-05-29 DIAGNOSIS — I10 ESSENTIAL HYPERTENSION: Primary | ICD-10-CM

## 2020-05-29 DIAGNOSIS — E66.01 CLASS 3 SEVERE OBESITY DUE TO EXCESS CALORIES WITH BODY MASS INDEX (BMI) OF 40.0 TO 44.9 IN ADULT, UNSPECIFIED WHETHER SERIOUS COMORBIDITY PRESENT (HCC): ICD-10-CM

## 2020-05-29 DIAGNOSIS — R80.9 MICROALBUMINURIA: ICD-10-CM

## 2020-05-29 DIAGNOSIS — E11.21 TYPE 2 DIABETES WITH NEPHROPATHY (HCC): ICD-10-CM

## 2020-05-29 NOTE — TELEPHONE ENCOUNTER
Phone encounter received from [facility] that patient was on his way to have labs done at [facility] fax #[phone]. Labs were ordered per verbal read back  Per  for appointment on 6/2/20.

## 2020-05-29 NOTE — TELEPHONE ENCOUNTER
Please fax lab orders to fax #[phone] [facility]) [facility] as pt is going now to get those drawn. Thanks.

## 2020-05-30 LAB
ALBUMIN SERPL-MCNC: 4.5 G/DL (ref 3.7–4.7)
ALBUMIN/CREAT UR: 63 MG/G CREAT (ref 0–29)
ALBUMIN/GLOB SERPL: 1.3 {RATIO} (ref 1.2–2.2)
ALP SERPL-CCNC: 70 IU/L (ref 39–117)
ALT SERPL-CCNC: 20 IU/L (ref 0–44)
AST SERPL-CCNC: 16 IU/L (ref 0–40)
BASOPHILS # BLD AUTO: 0.1 X10E3/UL (ref 0–0.2)
BASOPHILS NFR BLD AUTO: 1 %
BILIRUB SERPL-MCNC: 1.1 MG/DL (ref 0–1.2)
BUN SERPL-MCNC: 13 MG/DL (ref 8–27)
BUN/CREAT SERPL: 11 (ref 10–24)
CALCIUM SERPL-MCNC: 9.8 MG/DL (ref 8.6–10.2)
CHLORIDE SERPL-SCNC: 104 MMOL/L (ref 96–106)
CHOLEST SERPL-MCNC: 156 MG/DL (ref 100–199)
CO2 SERPL-SCNC: 24 MMOL/L (ref 20–29)
CREAT SERPL-MCNC: 1.2 MG/DL (ref 0.76–1.27)
CREAT UR-MCNC: 171.3 MG/DL
EOSINOPHIL # BLD AUTO: 0.1 X10E3/UL (ref 0–0.4)
EOSINOPHIL NFR BLD AUTO: 1 %
ERYTHROCYTE [DISTWIDTH] IN BLOOD BY AUTOMATED COUNT: 14.6 % (ref 11.6–15.4)
EST. AVERAGE GLUCOSE BLD GHB EST-MCNC: 177 MG/DL
GLOBULIN SER CALC-MCNC: 3.4 G/DL (ref 1.5–4.5)
GLUCOSE SERPL-MCNC: 118 MG/DL (ref 65–99)
HBA1C MFR BLD: 7.8 % (ref 4.8–5.6)
HCT VFR BLD AUTO: 45.1 % (ref 37.5–51)
HDLC SERPL-MCNC: 59 MG/DL
HGB BLD-MCNC: 14.6 G/DL (ref 13–17.7)
IMM GRANULOCYTES # BLD AUTO: 0 X10E3/UL (ref 0–0.1)
IMM GRANULOCYTES NFR BLD AUTO: 1 %
LDLC SERPL CALC-MCNC: 73 MG/DL (ref 0–99)
LYMPHOCYTES # BLD AUTO: 2.4 X10E3/UL (ref 0.7–3.1)
LYMPHOCYTES NFR BLD AUTO: 29 %
MCH RBC QN AUTO: 28.6 PG (ref 26.6–33)
MCHC RBC AUTO-ENTMCNC: 32.4 G/DL (ref 31.5–35.7)
MCV RBC AUTO: 88 FL (ref 79–97)
MICROALBUMIN UR-MCNC: 107.8 UG/ML
MONOCYTES # BLD AUTO: 0.7 X10E3/UL (ref 0.1–0.9)
MONOCYTES NFR BLD AUTO: 9 %
NEUTROPHILS # BLD AUTO: 4.9 X10E3/UL (ref 1.4–7)
NEUTROPHILS NFR BLD AUTO: 59 %
PLATELET # BLD AUTO: 228 X10E3/UL (ref 150–450)
POTASSIUM SERPL-SCNC: 4.1 MMOL/L (ref 3.5–5.2)
PROT SERPL-MCNC: 7.9 G/DL (ref 6–8.5)
RBC # BLD AUTO: 5.1 X10E6/UL (ref 4.14–5.8)
SODIUM SERPL-SCNC: 144 MMOL/L (ref 134–144)
TRIGL SERPL-MCNC: 118 MG/DL (ref 0–149)
VLDLC SERPL CALC-MCNC: 24 MG/DL (ref 5–40)
WBC # BLD AUTO: 8.3 X10E3/UL (ref 3.4–10.8)

## 2020-06-02 ENCOUNTER — VIRTUAL VISIT (OUTPATIENT)
Dept: INTERNAL MEDICINE CLINIC | Age: 75
End: 2020-06-02

## 2020-06-02 VITALS — HEART RATE: 76 BPM | SYSTOLIC BLOOD PRESSURE: 126 MMHG | DIASTOLIC BLOOD PRESSURE: 77 MMHG | TEMPERATURE: 97.7 F

## 2020-06-02 DIAGNOSIS — H81.10 BENIGN PAROXYSMAL POSITIONAL VERTIGO, UNSPECIFIED LATERALITY: ICD-10-CM

## 2020-06-02 DIAGNOSIS — N28.9 MILD RENAL INSUFFICIENCY: ICD-10-CM

## 2020-06-02 DIAGNOSIS — Z13.31 SCREENING FOR DEPRESSION: ICD-10-CM

## 2020-06-02 DIAGNOSIS — Z00.00 MEDICARE ANNUAL WELLNESS VISIT, SUBSEQUENT: Primary | ICD-10-CM

## 2020-06-02 DIAGNOSIS — M17.9 OSTEOARTHRITIS OF KNEE, UNSPECIFIED LATERALITY, UNSPECIFIED OSTEOARTHRITIS TYPE: ICD-10-CM

## 2020-06-02 DIAGNOSIS — E11.21 TYPE 2 DIABETES WITH NEPHROPATHY (HCC): ICD-10-CM

## 2020-06-02 DIAGNOSIS — I10 ESSENTIAL HYPERTENSION: ICD-10-CM

## 2020-06-02 RX ORDER — DICLOFENAC SODIUM 10 MG/G
2 GEL TOPICAL 4 TIMES DAILY
Qty: 100 G | Refills: 3 | Status: SHIPPED | OUTPATIENT
Start: 2020-06-02

## 2020-06-02 RX ORDER — MECLIZINE HCL 12.5 MG 12.5 MG/1
12.5 TABLET ORAL
Qty: 30 TAB | Refills: 1 | Status: SHIPPED | OUTPATIENT
Start: 2020-06-02 | End: 2020-06-12

## 2020-06-02 NOTE — PROGRESS NOTES
This is the Subsequent Medicare Annual Wellness Exam, performed 12 months or more after the Initial AWV or the last Subsequent AWV    I have reviewed the patient's medical history in detail and updated the computerized patient record. History     Patient Active Problem List   Diagnosis Code    Osteoarthritis M19.90    HTN (hypertension) I10    Diabetes mellitus ([facility] E11.9    Obesity E66.9    Mild renal insufficiency N28.9    History of colon polyps Z86.010    Microalbuminuria R80.9    Obesity, morbid (HCC) E66.01    Type 2 diabetes with nephropathy (HCC) E11.21    BMI 40.0-44.9, adult ([facility] Z68.41     Past Medical History:   Diagnosis Date    Arthritis     knees, feet, hands    Diabetes mellitus ([facility]     Does use hearing aid     Hearing loss     Has seen Dr. Prather So Hypertension     Obesity       Past Surgical History:   Procedure Laterality Date    COLONOSCOPY N/A 7/8/2019    COLONOSCOPY performed by Oma Murphy MD at \[facility]\"" ENDOSCOPY    COLONOSCOPY,DIAGNOSTIC  7/8/2019         HX CATARACT REMOVAL Bilateral ~ 2009    Bilateral    HX ROTATOR CUFF REPAIR Left 12/13/2013    shoulder    HX TONSILLECTOMY  1950    as a child    WA COLONOSCOPY FLX DX W/COLLJ SPEC WHEN PFRMD  5/15/2014    Dr. Israel Weems     Current Outpatient Medications   Medication Sig Dispense Refill    meclizine (ANTIVERT) 12.5 mg tablet Take 1 Tab by mouth three (3) times daily as needed for Dizziness or Nausea for up to 10 days. 30 Tab 1    OneTouch Verio strip TEST BLOOD SUGAR EVERY DAY MUST ESTABLISH WITH NEW ENDOCRINOLOGIST OR CONTACT PCP FOR FURTHER REFILLS 100 Strip 1    amLODIPine (NORVASC) 10 mg tablet TAKE 1 TABLET DAILY 90 Tab 4    glipiZIDE (GLUCOTROL) 5 mg tablet Take 1 Tab by mouth two (2) times a day. 180 Tab 3    losartan (COZAAR) 50 mg tablet Take 1 Tab by mouth two (2) times a day. 180 Tab 3    dapagliflozin (FARXIGA) 10 mg tab tablet Take 1 Tab by mouth daily.  90 Tab 3    semaglutide (OZEMPIC) 1 mg/dose (2 mg/1.5 mL) sub-q pen 1 mg by SubCUTAneous route every seven (7) days. 9 mL 4    metFORMIN (GLUCOPHAGE) 1,000 mg tablet TAKE 1 TABLET TWICE A DAY WITH MEALS 180 Tab 4    sildenafil citrate (VIAGRA) 50 mg tablet Take 1 Tab by mouth as needed. 30 Tab 10    BD ULTRA-FINE MINI PEN NEEDLE 31 gauge x 3/16\" ndle USE WITH INSULIN INJECTIONS DAILY 100 Pen Needle 3    miscellaneous medical supply misc Order for diabetic shoes and inserts. 1 Each 0    UNIFINE PENTIPS PLUS 31 gauge x 1/4\" ndle FOR USE WITH INSULIN INJECT IONS DAILY 100 Each 3    Insulin Needles, Disposable, (PEN NEEDLE) 32 x 5/32 \" ndle 1 each by SubCUTAneous route daily. 100 each 3     Allergies   Allergen Reactions    Ace Inhibitors Cough       Family History   Problem Relation Age of Onset    Hypertension Mother     Cancer Mother         lung    Cancer Father 39        prostate    Other Maternal Grandmother          as result of complications of childbirth when baby was 8 mos old   [address] [place] [address] Cancer Sister         half sister-breast CA in remission    Other Maternal Grandfather         aneurysm    Heart Disease Paternal Grandmother      Social History     Tobacco Use    Smoking status: Former Smoker     Packs/day: 1.00     Years: 5.00     Pack years: 5.00     Last attempt to quit: 1970     Years since quittin.4    Smokeless tobacco: Never Used   Substance Use Topics    Alcohol use: No       Depression Risk Factor Screening:     3 most recent PHQ Screens 2020   Little interest or pleasure in doing things Not at all   Feeling down, depressed, irritable, or hopeless Not at all   Total Score PHQ 2 0       Alcohol Risk Factor Screening (MALE > 65): Do you average more 1 drink per night or more than 7 drinks a week: No    In the past three months have you have had more than 4 drinks containing alcohol on one occasion: No      Functional Ability and Level of Safety:   Hearing: The patient wears hearing aids. [address] Lives with wife and children. Activities of Daily Living: The home contains: no safety equipment. Patient does total self care    Ambulation: with no difficulty    Fall Risk:  Fall Risk Assessment, last 12 mths 6/2/2020   Able to walk? Yes   Fall in past 12 months? No       Abuse Screen:  Patient is not abused    Cognitive Screening   Has your family/caregiver stated any concerns about your memory: no  Cognitive Screening: Normal    Patient Care Team   Patient Care Team:  Pascual Man MD as PCP - General (Internal Medicine)  Pascual Man MD [facility] PCP [facility]  Shannan Blackmon MD (Ophthalmology)  Aristides Rudd MD (Gastroenterology)  Candance Schiller, MD as Consulting Provider (Endocrinology)  Petey Tejada MD (Orthopedic Surgery)  Dr. Alberto Miles (Urology)  Dr. Manas Arnold ([address] [place] [address])  Ely Cody DPM (Podiatry)    Assessment/Plan   Education and counseling provided:  Are appropriate based on today's review and evaluation    Diagnoses and all orders for this visit:    1. Benign paroxysmal positional vertigo, unspecified laterality  -     meclizine (ANTIVERT) 12.5 mg tablet; Take 1 Tab by mouth three (3) times daily as needed for Dizziness or Nausea for up to 10 days.         Health Maintenance Due   Topic Date Due    Shingrix Vaccine Age 49> (1 of 2) 06/30/1993

## 2020-06-02 NOTE — PATIENT INSTRUCTIONS
Office Policies Phone calls/patient messages: Please allow up to 24 hours for someone in the office to contact you about your call or message. Be mindful your provider may be out of the office or your message may require further review. We encourage you to use [employer] for your messages as this is a faster, more efficient way to communicate with our office Medication Refills: 
         
Prescription medications require 48-72 business hours to process. We encourage you to use [employer] for your refills. For controlled medications: Please allow 72 business hours to process. Certain medications may require you to  a written prescription at our office. NO narcotic/controlled medications will be prescribed after 4pm Monday through Friday or on weekends Form/Paperwork Completion: 
         
Please note a $25 fee may incur for all paperwork for completed by our providers. We ask that you allow 7-10 business days. Pre-payment is due prior to picking up/faxing the completed form. You may also download your forms to [employer] to have your doctor print off. 
 
 
1. Have you been to the ER, urgent care clinic since your last visit? Hospitalized since your last visit?no 2. Have you seen or consulted any other health care providers outside of the [address] [place] [address] since your last visit? Include any pap smears or colon screening. no 
Medicare Wellness Visit, Male The best way to live healthy is to have a lifestyle where you eat a well-balanced diet, exercise regularly, limit alcohol use, and quit all forms of tobacco/nicotine, if applicable. Regular preventive services are another way to keep healthy. Preventive services (vaccines, screening tests, monitoring & exams) can help personalize your care plan, which helps you manage your own care. Screening tests can find health problems at the earliest stages, when they are easiest to treat. [facility] follows the current, evidence-based guidelines published by the [facility] States Ruel Marquez ([facility]) when recommending preventive services for our patients. Because we follow these guidelines, sometimes recommendations change over time as research supports it. (For example, a prostate screening blood test is no longer routinely recommended for men with no symptoms). Of course, you and your doctor may decide to screen more often for some diseases, based on your risk and co-morbidities (chronic disease you are already diagnosed with). Preventive services for you include: - Medicare offers their members a free annual wellness visit, which is time for you and your primary care provider to discuss and plan for your preventive service needs. Take advantage of this benefit every year! 
-All adults over age 72 should receive the recommended pneumonia vaccines. Current USPSTF guidelines recommend a series of two vaccines for the best pneumonia protection.  
-All adults should have a flu vaccine yearly and tetanus vaccine every 10 years. 
-All adults age 48 and older should receive the shingles vaccines (series of two vaccines). -All adults age 38-68 who are overweight should have a diabetes screening test once every three years.  
-Other screening tests & preventive services for persons with diabetes include: an eye exam to screen for diabetic retinopathy, a kidney function test, a foot exam, and stricter control over your cholesterol.  
-Cardiovascular screening for adults with routine risk involves an electrocardiogram (ECG) at intervals determined by the provider.  
-Colorectal cancer screening should be done for adults age 54-65 with no increased risk factors for colorectal cancer. There are a number of acceptable methods of screening for this type of cancer. Each test has its own benefits and drawbacks.  Discuss with your provider what is most appropriate for you during your annual wellness visit. The different tests include: colonoscopy (considered the best screening method), a fecal occult blood test, a fecal DNA test, and sigmoidoscopy. 
-All adults born between [facility] should be screened once for Hepatitis C. 
-An Abdominal Aortic Aneurysm (AAA) Screening is recommended for men age 73-68 who has ever smoked in their lifetime. Here is a list of your current Health Maintenance items (your personalized list of preventive services) with a due date: 
Health Maintenance Due Topic Date Due  Shingles Vaccine (1 of 2) 06/30/1993

## 2020-06-02 NOTE — PROGRESS NOTES
Ben Cohen is a 66 y.o. male who was seen by synchronous (real-time) audio-video technology on 6/2/2020. Consent: Ben Cohen, who was seen by synchronous (real-time) audio-video technology, and/or his healthcare decision maker, is aware that this patient-initiated, Telehealth encounter on 6/2/2020 is a billable service, with coverage as determined by his insurance carrier. He is aware that he may receive a bill and has provided verbal consent to proceed: Yes. Subjective:   Ben Cohen is a 66 y.o. 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 Ben Cohen is a 66 y.o. male who is here for follow up of routine medical issues. Previously he saw Dr. Killian Merritt. 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 \"  Gluc 102. He has been diabetic since about 2005. He sees Dr. Temi Gunderson as well. His BP at home is running [id number] systolic. \"My main problem is my knees. \"  L > R. Continues to have knee pain in L knee--\"I went to see Dr. Erum Radford, and he gave me a cortisone shot and said it was bone-on-bone there. \"    Also stiffness in AM for a little while, a few minutes (less than 15)--back, hips. \"Gets better once I get moving around. \"   At this time, he is otherwise doing well and has brought no other complaints to my attention today. For a list of the medical issues addressed today, see the assessment and plan below.     PMH:   Past Medical History:   Diagnosis Date    Arthritis     knees, feet, hands    Diabetes mellitus ([address]     Does use hearing aid     Hearing loss Has seen Dr. Fran Quintanilla Hypertension     Obesity        Past Surgical History:   Procedure Laterality Date    COLONOSCOPY N/A 2019    [facility]  [facility]         HX CATARACT REMOVAL Bilateral ~     Bilateral    HX ROTATOR CUFF REPAIR Left 2013    shoulder    HX TONSILLECTOMY  1948    as a child    [place] COLONOSCOPY FLX DX W/COLLJ SPEC WHEN PFRMD  5/15/2014    Dr. Neela Murphy       All: He is allergic to ace inhibitors. Current Outpatient Medications   Medication Sig    OneTouch Verio strip TEST BLOOD SUGAR EVERY DAY MUST ESTABLISH WITH NEW ENDOCRINOLOGIST OR CONTACT PCP FOR FURTHER REFILLS    amLODIPine (NORVASC) 10 mg tablet TAKE 1 TABLET DAILY    glipiZIDE (GLUCOTROL) 5 mg tablet Take 1 Tab by mouth two (2) times a day.  losartan (COZAAR) 50 mg tablet Take 1 Tab by mouth two (2) times a day.  dapagliflozin (FARXIGA) 10 mg tab tablet Take 1 Tab by mouth daily.  semaglutide (OZEMPIC) 1 mg/dose (2 mg/1.5 mL) sub-q pen 1 mg by SubCUTAneous route every seven (7) days.  metFORMIN (GLUCOPHAGE) 1,000 mg tablet TAKE 1 TABLET TWICE A DAY WITH MEALS    sildenafil citrate (VIAGRA) 50 mg tablet Take 1 Tab by mouth as needed.  BD ULTRA-FINE MINI PEN NEEDLE 31 gauge x 3/16\" ndle USE WITH INSULIN INJECTIONS DAILY    miscellaneous medical supply misc Order for diabetic shoes and inserts.  UNIFINE PENTIPS PLUS 31 gauge x 1/4\" ndle FOR USE WITH INSULIN INJECT IONS DAILY    Insulin Needles, Disposable, (PEN NEEDLE) 32 x 5/32 \" ndle 1 each by SubCUTAneous route daily. No current facility-administered medications for this visit. FH:  Mother had lung cancer, also had HTN. Father of  of prostate cancer age 39. His sister has had breast cancer. His family history includes Heart Disease in his paternal grandmother; Hypertension in his mother; and brain aneurysm in his maternal grandfather.   His maternal grandmother  of complications of childbirth. SH: He is retired . [employer] . . He reports that he quit smoking about 50 years ago. He has a 5.00 pack-year smoking history. He has never used smokeless tobacco. He reports that he does not drink alcohol or use drugs. ROS: See above; Complete ROS otherwise negative. OBJECTIVE:   Vitals: There were no vitals taken for this visit. Gen: Pleasant, obese 66 y.o. AA male in NAD. HEENT: PERRLA. EOMI. OP moist and pink. TM: Pearly bilaterally. Neck: Supple. No LAD. HEART: RRR, No M/G/R.    LUNGS: CTAB No W/R. ABDOMEN: S, NT, ND, BS+. EXTREMITIES: Warm. No C/C/E.  MUSCULOSKELETAL: Normal ROM, muscle strength 5/5 all groups. NEURO: Alert and oriented x 3. Cranial nerves grossly intact. No focal sensory or motor deficits noted. SKIN: Warm. Dry. No rashes or other lesions noted. Lab Results   Component Value Date/Time    Sodium 144 2020 10:03 AM    Potassium 4.1 2020 10:03 AM    Chloride 104 2020 10:03 AM    CO2 24 2020 10:03 AM    Glucose 118 (H) 2020 10:03 AM    BUN 13 2020 10:03 AM    Creatinine 1.20 2020 10:03 AM    BUN/Creatinine ratio 11 2020 10:03 AM    GFR est AA 67 2020 10:03 AM    GFR est non-AA 58 (L) 2020 10:03 AM    Calcium 9.8 2020 10:03 AM    Bilirubin, total 1.1 2020 10:03 AM    ALT (SGPT) 20 2020 10:03 AM    Alk.  phosphatase 70 2020 10:03 AM    Protein, total 7.9 2020 10:03 AM    Albumin 4.5 2020 10:03 AM    A-G Ratio 1.3 2020 10:03 AM       Lab Results   Component Value Date/Time    Cholesterol, total 156 2020 10:03 AM    HDL Cholesterol 59 2020 10:03 AM    LDL, calculated 73 2020 10:03 AM    Triglyceride 118 2020 10:03 AM        Lab Results   Component Value Date/Time    Hemoglobin A1c 7.8 (H) 2020 10:03 AM       Lab Results   Component Value Date/Time    WBC 8.3 2020 10:03 AM    HGB 14.6 05/29/2020 10:03 AM    HCT 45.1 05/29/2020 10:03 AM    PLATELET 872 98/96/9277 10:03 AM    MCV 88 05/29/2020 10:03 AM         ASSESSMENT/ PLAN:     1. HTN (hypertension): Typically normal at home. Likely has \"white coat syndrome\". 2. Osteoarthritis: L knee pain. Inhibits ambulation at times--he has a handicap placard. Continue follow up with orthopedics. May take more tylenol (up to limit of 4000 mg / day)  3. Mild renal insufficiency: May be developing some CKD due to DM +/- HTN. For now, work on DM control and continue ARB. 4. Diabetes mellitus: Not controlled. As per Dr. Daniella Kim. 5. Obesity: Diet and exercise. I have reviewed the patient's medications and risks/side effects/benefits were discussed. Diagnosis(-es) explained to patient and questions answered. Literature provided where appropriate. Follow-up and Dispositions    · Return in about 6 months (around 12/2/2020) for HTN. Discussed the patient's BMI with him. The BMI follow up plan is as follows:     dietary management education, guidance, and counseling  encourage exercise  monitor weight  prescribed dietary intake    An After Visit Summary was printed and given to the patient. Objective:   Vital Signs: (As obtained by patient/caregiver at home)  There were no vitals taken for this visit.      [INSTRUCTIONS:  \"[x]\" Indicates a positive item  \"[]\" Indicates a negative item  -- DELETE ALL ITEMS NOT EXAMINED]    Constitutional: [x] Appears well-developed and well-nourished [x] No apparent distress      [] Abnormal -     Mental status: [x] Alert and awake  [x] Oriented to person/place/time [x] Able to follow commands    [] Abnormal -     Eyes:   EOM    [x]  Normal    [] Abnormal -   Sclera  [x]  Normal    [] Abnormal -          Discharge [x]  None visible   [] Abnormal -     HENT: [x] Normocephalic, atraumatic  [] Abnormal -   [x] Mouth/Throat: Mucous membranes are moist    External Ears [x] Normal [] Abnormal -    Neck: [x] No visualized mass [] Abnormal -     Pulmonary/Chest: [x] Respiratory effort normal   [x] No visualized signs of difficulty breathing or respiratory distress        [] Abnormal -      Musculoskeletal:   [x] Normal gait with no signs of ataxia         [x] Normal range of motion of neck        [] Abnormal -     Neurological:        [x] No Facial Asymmetry (Cranial nerve 7 motor function) (limited exam due to video visit)          [x] No gaze palsy        [] Abnormal -          Skin:        [x] No significant exanthematous lesions or discoloration noted on facial skin         [] Abnormal -            Psychiatric:       [x] Normal Affect [] Abnormal -        [x] No Hallucinations    Other pertinent observable physical exam findings:-        We discussed the expected course, resolution and complications of the diagnosis(es) in detail. Medication risks, benefits, costs, interactions, and alternatives were discussed as indicated. I advised him to contact the office if his condition worsens, changes or fails to improve as anticipated. He expressed understanding with the diagnosis(es) and plan. Pamella Mooney is a 66 y.o. male who was evaluated by a video visit encounter for concerns as above. Patient identification was verified prior to start of the visit. A caregiver was present when appropriate. Due to this being a TeleHealth encounter (During [employer] public [facility] emergency), evaluation of the following organ systems was limited: Vitals/Constitutional/EENT/Resp/CV/GI//MS/Neuro/Skin/Heme-Lymph-Imm. Pursuant to the emergency declaration under the [facility], 1135 waiver authority and the [employer] Act, this Virtual  Visit was conducted, with patient's (and/or legal guardian's) consent, to reduce the patient's risk of exposure to COVID-19 and provide necessary medical care.      Services were provided through a video synchronous discussion virtually to substitute for in-person clinic visit. Patient and provider were located at their individual homes.       Juan C Skelton MD

## 2020-11-11 RX ORDER — METFORMIN HYDROCHLORIDE 1000 MG/1
TABLET ORAL
Qty: 180 TAB | Refills: 3 | Status: SHIPPED | OUTPATIENT
Start: 2020-11-11 | End: 2020-11-12 | Stop reason: SDUPTHER

## 2020-11-11 NOTE — TELEPHONE ENCOUNTER
Will forward refill for metformin to PCP as patient is no longer under Dr. Rodo Correa care since he left on 1/19/20 and has not established with another physician in our practice.

## 2020-11-13 RX ORDER — METFORMIN HYDROCHLORIDE 1000 MG/1
1000 TABLET ORAL 2 TIMES DAILY WITH MEALS
Qty: 180 TAB | Refills: 3 | Status: SHIPPED | OUTPATIENT
Start: 2020-11-13 | End: 2021-12-24

## 2020-12-02 ENCOUNTER — VIRTUAL VISIT (OUTPATIENT)
Dept: INTERNAL MEDICINE CLINIC | Age: 75
End: 2020-12-02
Payer: MEDICARE

## 2020-12-02 DIAGNOSIS — E11.21 TYPE 2 DIABETES WITH NEPHROPATHY (HCC): Primary | ICD-10-CM

## 2020-12-02 DIAGNOSIS — H81.10 BENIGN PAROXYSMAL POSITIONAL VERTIGO, UNSPECIFIED LATERALITY: ICD-10-CM

## 2020-12-02 DIAGNOSIS — I10 ESSENTIAL HYPERTENSION: ICD-10-CM

## 2020-12-02 DIAGNOSIS — N28.9 MILD RENAL INSUFFICIENCY: ICD-10-CM

## 2020-12-02 DIAGNOSIS — M17.9 OSTEOARTHRITIS OF KNEE, UNSPECIFIED LATERALITY, UNSPECIFIED OSTEOARTHRITIS TYPE: ICD-10-CM

## 2020-12-02 PROCEDURE — 1101F PT FALLS ASSESS-DOCD LE1/YR: CPT | Performed by: INTERNAL MEDICINE

## 2020-12-02 PROCEDURE — G8417 CALC BMI ABV UP PARAM F/U: HCPCS | Performed by: INTERNAL MEDICINE

## 2020-12-02 PROCEDURE — G8427 DOCREV CUR MEDS BY ELIG CLIN: HCPCS | Performed by: INTERNAL MEDICINE

## 2020-12-02 PROCEDURE — G8432 DEP SCR NOT DOC, RNG: HCPCS | Performed by: INTERNAL MEDICINE

## 2020-12-02 PROCEDURE — 99214 OFFICE O/P EST MOD 30 MIN: CPT | Performed by: INTERNAL MEDICINE

## 2020-12-02 PROCEDURE — G8536 NO DOC ELDER MAL SCRN: HCPCS | Performed by: INTERNAL MEDICINE

## 2020-12-02 PROCEDURE — G8756 NO BP MEASURE DOC: HCPCS | Performed by: INTERNAL MEDICINE

## 2020-12-02 PROCEDURE — 3051F HG A1C>EQUAL 7.0%<8.0%: CPT | Performed by: INTERNAL MEDICINE

## 2020-12-02 NOTE — PROGRESS NOTES
Prabhu Yanes is a 66 y.o. male who was seen by synchronous (real-time) audio-video technology on 12/2/2020. Consent: Prabhu Yanes, who was seen by synchronous (real-time) audio-video technology, and/or his healthcare decision maker, is aware that this patient-initiated, Telehealth encounter on 12/2/2020 is a billable service, with coverage as determined by his insurance carrier. He is aware that he may receive a bill and has provided verbal consent to proceed: Yes. Subjective:   Prabhu Yanes is a 66 y.o. male who was seen for Hypertension    SUBJECTIVE:   Mr. Prabhu Yanes is a 66 y.o. male who is here for follow up of routine medical issues. Previously he saw Dr. Jennifer Smallwood. Chief Complaint   Patient presents with    Hypertension       Vertigo \"now and then\": He has been to see Dr. Aden Merino. \"He said it was crystals in my inner ear. \" Referred him to Dr. Watters Sarah Dec 18. Gluc 136 this morning. He has been diabetic since about 2005. He no longer sees Dr. Wilman Waller. \"I have a problem with Farxiga--I can taste it, I can smell it in my sweat and in my urine. \"   He had eye exam with Dr. Maury Callejas last month. His BP at home is 129/83 this morning. Knee pain L > R. Continues to have knee pain in L knee--Previously he saw Dr. Brandon Gutierrez. \"he gave me a cortisone shot and said it was bone-on-bone there. \"    Also stiffness in AM for a little while, a few minutes (less than 15)--back, hips. \"Gets better once I get moving around. \"   At this time, he is otherwise doing well and has brought no other complaints to my attention today. For a list of the medical issues addressed today, see the assessment and plan below.     PMH:   Past Medical History:   Diagnosis Date    Arthritis     knees, feet, hands    Diabetes mellitus ([address]     Does use hearing aid     Hearing loss     Has seen Dr. Alix Correa Hypertension     Obesity        Past Surgical History:   Procedure Laterality Date    COLONOSCOPY N/A 7/8/2019 [facility]  2019         HX CATARACT REMOVAL Bilateral ~     Bilateral    HX ROTATOR CUFF REPAIR Left 2013    shoulder    HX TONSILLECTOMY  1948    as a child    [place] COLONOSCOPY FLX DX W/COLLJ SPEC WHEN PFRMD  5/15/2014    Dr. Miroslava Quiroz       All: He is allergic to ace inhibitors. Current Outpatient Medications   Medication Sig    metFORMIN (GLUCOPHAGE) 1,000 mg tablet Take 1 Tab by mouth two (2) times daily (with meals).  diclofenac (VOLTAREN) 1 % gel Apply 2 g to affected area four (4) times daily.  OneTouch Verio strip TEST BLOOD SUGAR EVERY DAY MUST ESTABLISH WITH NEW ENDOCRINOLOGIST OR CONTACT PCP FOR FURTHER REFILLS    amLODIPine (NORVASC) 10 mg tablet TAKE 1 TABLET DAILY    glipiZIDE (GLUCOTROL) 5 mg tablet Take 1 Tab by mouth two (2) times a day.  losartan (COZAAR) 50 mg tablet Take 1 Tab by mouth two (2) times a day.  dapagliflozin (FARXIGA) 10 mg tab tablet Take 1 Tab by mouth daily.  semaglutide (OZEMPIC) 1 mg/dose (2 mg/1.5 mL) sub-q pen 1 mg by SubCUTAneous route every seven (7) days.  sildenafil citrate (VIAGRA) 50 mg tablet Take 1 Tab by mouth as needed.  BD ULTRA-FINE MINI PEN NEEDLE 31 gauge x 3/16\" ndle USE WITH INSULIN INJECTIONS DAILY    miscellaneous medical supply misc Order for diabetic shoes and inserts.  UNIFINE PENTIPS PLUS 31 gauge x 1/4\" ndle FOR USE WITH INSULIN INJECT IONS DAILY    Insulin Needles, Disposable, (PEN NEEDLE) 32 x 5/32 \" ndle 1 each by SubCUTAneous route daily. No current facility-administered medications for this visit. FH:  Mother had lung cancer, also had HTN. Father of  of prostate cancer age 39. His sister has had breast cancer. His family history includes Heart Disease in his paternal grandmother; Hypertension in his mother; and brain aneurysm in his maternal grandfather.   His maternal grandmother  of complications of childbirth. SH: He is retired . [employer] . . He reports that he quit smoking about 50 years ago. He has a 5.00 pack-year smoking history. He has never used smokeless tobacco. He reports that he does not drink alcohol or use drugs. ROS: See above; Complete ROS otherwise negative. OBJECTIVE:   Vitals: There were no vitals taken for this visit. Gen: Pleasant, obese 66 y.o. AA male in NAD. Lab Results   Component Value Date/Time    Sodium 144 05/29/2020 10:03 AM    Potassium 4.1 05/29/2020 10:03 AM    Chloride 104 05/29/2020 10:03 AM    CO2 24 05/29/2020 10:03 AM    Glucose 118 (H) 05/29/2020 10:03 AM    BUN 13 05/29/2020 10:03 AM    Creatinine 1.20 05/29/2020 10:03 AM    BUN/Creatinine ratio 11 05/29/2020 10:03 AM    GFR est AA 67 05/29/2020 10:03 AM    GFR est non-AA 58 (L) 05/29/2020 10:03 AM    Calcium 9.8 05/29/2020 10:03 AM    Bilirubin, total 1.1 05/29/2020 10:03 AM    ALT (SGPT) 20 05/29/2020 10:03 AM    Alk. phosphatase 70 05/29/2020 10:03 AM    Protein, total 7.9 05/29/2020 10:03 AM    Albumin 4.5 05/29/2020 10:03 AM    A-G Ratio 1.3 05/29/2020 10:03 AM       Lab Results   Component Value Date/Time    Cholesterol, total 156 05/29/2020 10:03 AM    HDL Cholesterol 59 05/29/2020 10:03 AM    LDL, calculated 73 05/29/2020 10:03 AM    Triglyceride 118 05/29/2020 10:03 AM        Lab Results   Component Value Date/Time    Hemoglobin A1c 7.8 (H) 05/29/2020 10:03 AM       Lab Results   Component Value Date/Time    WBC 8.3 05/29/2020 10:03 AM    HGB 14.6 05/29/2020 10:03 AM    HCT 45.1 05/29/2020 10:03 AM    PLATELET 605 72/38/5128 10:03 AM    MCV 88 05/29/2020 10:03 AM         ASSESSMENT/ PLAN:     1. HTN (hypertension): Typically normal at home. Likely has \"white coat syndrome\". 2. Osteoarthritis: L knee pain. Inhibits ambulation at times--he has a handicap placard. Continue follow up with orthopedics. May take more tylenol (up to limit of 4000 mg / day)  3.  Mild renal insufficiency: May be developing some CKD due to DM +/- HTN. For now, work on DM control and continue ARB. 4. Diabetes mellitus: Not controlled. As per Dr. Kam Frye. 5. Obesity: Diet and exercise. Follow-up and Dispositions    · Return in about 6 months (around 6/2/2021) for DM. Objective:   Vital Signs: (As obtained by patient/caregiver at home)  There were no vitals taken for this visit. [INSTRUCTIONS:  \"[x]\" Indicates a positive item  \"[]\" Indicates a negative item  -- DELETE ALL ITEMS NOT EXAMINED]    Constitutional: [x] Appears well-developed and well-nourished [x] No apparent distress      [] Abnormal -     Mental status: [x] Alert and awake  [x] Oriented to person/place/time [x] Able to follow commands    [] Abnormal -     Eyes:   EOM    [x]  Normal    [] Abnormal -   Sclera  [x]  Normal    [] Abnormal -          Discharge [x]  None visible   [] Abnormal -     HENT: [x] Normocephalic, atraumatic  [] Abnormal -   [x] Mouth/Throat: Mucous membranes are moist    External Ears [x] Normal  [] Abnormal -    Neck: [x] No visualized mass [] Abnormal -     Pulmonary/Chest: [x] Respiratory effort normal   [x] No visualized signs of difficulty breathing or respiratory distress        [] Abnormal -      Musculoskeletal:   [x] Normal gait with no signs of ataxia         [x] Normal range of motion of neck        [] Abnormal -     Neurological:        [x] No Facial Asymmetry (Cranial nerve 7 motor function) (limited exam due to video visit)          [x] No gaze palsy        [] Abnormal -          Skin:        [x] No significant exanthematous lesions or discoloration noted on facial skin         [] Abnormal -            Psychiatric:       [x] Normal Affect [] Abnormal -        [x] No Hallucinations    Other pertinent observable physical exam findings:-        We discussed the expected course, resolution and complications of the diagnosis(es) in detail.   Medication risks, benefits, costs, interactions, and alternatives were discussed as indicated. I advised him to contact the office if his condition worsens, changes or fails to improve as anticipated. He expressed understanding with the diagnosis(es) and plan. Juana Kurtz is a 66 y.o. male who was evaluated by a video visit encounter for concerns as above. Patient identification was verified prior to start of the visit. A caregiver was present when appropriate. Due to this being a TeleHealth encounter (During [employer] public health emergency), evaluation of the following organ systems was limited: Vitals/Constitutional/EENT/Resp/CV/GI//MS/Neuro/Skin/Heme-Lymph-Imm. Pursuant to the emergency declaration under the [facility], 1135 waiver authority and the [employer] Act, this Virtual  Visit was conducted, with patient's (and/or legal guardian's) consent, to reduce the patient's risk of exposure to COVID-19 and provide necessary medical care. Services were provided through a video synchronous discussion virtually to substitute for in-person clinic visit. Patient and provider were located at their individual homes.       Flaco Torrez MD

## 2020-12-02 NOTE — PROGRESS NOTES
Reviewed record in preparation for visit and have obtained necessary documentation. Identified pt with two pt identifiers(name and ). Chief Complaint   Patient presents with    Hypertension       Health Maintenance Due   Topic Date Due    Shingles Vaccine (1 of 2) 1993    Yearly Flu Vaccine (1) 2020    Diabetic Foot Care  2020    Glaucoma Screening   2020    Eye Exam  2020    Hemoglobin A1C    2020       Mr. Cody Blakely has a reminder for a \"due or due soon\" health maintenance. I have asked that he discuss this further with his primary care provider for follow-up on this health maintenance. Coordination of Care Questionnaire:  :     1) Have you been to an emergency room, urgent care clinic since your last visit? no   Hospitalized since your last visit? no             2) Have you seen or consulted any other health care providers outside of [address] [place] [address] since your last visit? no  (Include any pap smears or colon screenings in this section.)    3) In the event something were to happen to you and you were unable to speak on your behalf, do you have an Advance Directive/ Living Will in place stating your wishes? NO    Do you have an Advance Directive on file? no    4) Are you interested in receiving information on Advance Directives? NO    Patient is accompanied by self I have received verbal consent from Divya Pelayo to discuss any/all medical information while they are present in the room.

## 2021-01-17 NOTE — TELEPHONE ENCOUNTER
Will forward refill for ozempic to PCP as patient is no longer under Dr. Jasmyne Gay care since he left on 1/19/20 and has not established with another physician in our practice.

## 2021-01-18 RX ORDER — SEMAGLUTIDE 1.34 MG/ML
INJECTION, SOLUTION SUBCUTANEOUS
Qty: 9 ML | Refills: 3 | Status: SHIPPED | OUTPATIENT
Start: 2021-01-18 | End: 2022-01-23

## 2021-01-25 ENCOUNTER — IMMUNIZATION (OUTPATIENT)
Dept: INTERNAL MEDICINE CLINIC | Age: 76
End: 2021-01-25
Payer: MEDICARE

## 2021-01-25 DIAGNOSIS — Z23 ENCOUNTER FOR IMMUNIZATION: Primary | ICD-10-CM

## 2021-01-25 PROCEDURE — 91301 COVID-19, MRNA, LNP-S, PF, 100MCG/0.5ML DOSE(MODERNA): CPT | Performed by: FAMILY MEDICINE

## 2021-01-25 PROCEDURE — 0011A PR IMM ADMN SARSCOV2 100 MCG/0.5 ML 1ST DOSE: CPT | Performed by: FAMILY MEDICINE

## 2021-02-04 RX ORDER — ACYCLOVIR 800 MG/1
800 TABLET ORAL
Qty: 35 TAB | Refills: 0 | Status: SHIPPED | OUTPATIENT
Start: 2021-02-04 | End: 2021-02-04 | Stop reason: SDUPTHER

## 2021-02-04 RX ORDER — ACYCLOVIR 800 MG/1
800 TABLET ORAL
Qty: 35 TAB | Refills: 0 | Status: SHIPPED | OUTPATIENT
Start: 2021-02-04 | End: 2021-02-11

## 2021-02-22 ENCOUNTER — IMMUNIZATION (OUTPATIENT)
Dept: INTERNAL MEDICINE CLINIC | Age: 76
End: 2021-02-22
Payer: MEDICARE

## 2021-02-22 DIAGNOSIS — Z23 ENCOUNTER FOR IMMUNIZATION: Primary | ICD-10-CM

## 2021-02-22 PROCEDURE — 91301 COVID-19, MRNA, LNP-S, PF, 100MCG/0.5ML DOSE(MODERNA): CPT | Performed by: FAMILY MEDICINE

## 2021-02-22 PROCEDURE — 0012A COVID-19, MRNA, LNP-S, PF, 100MCG/0.5ML DOSE(MODERNA): CPT | Performed by: FAMILY MEDICINE

## 2021-03-11 NOTE — TELEPHONE ENCOUNTER
Requested Prescriptions     Pending Prescriptions Disp Refills    amLODIPine (NORVASC) 10 mg tablet 90 Tab 4     Future Appointments:  No future appointments.      Last Appointment With Me:  12/2/2020     Requested Prescriptions     Pending Prescriptions Disp Refills    amLODIPine (NORVASC) 10 mg tablet 90 Tab 4

## 2021-03-12 RX ORDER — LOSARTAN POTASSIUM 50 MG/1
TABLET ORAL
Qty: 180 TAB | Refills: 3 | Status: SHIPPED | OUTPATIENT
Start: 2021-03-12 | End: 2022-04-12

## 2021-03-12 RX ORDER — GLIPIZIDE 5 MG/1
TABLET ORAL
Qty: 180 TAB | Refills: 3 | Status: SHIPPED | OUTPATIENT
Start: 2021-03-12 | End: 2022-04-12

## 2021-03-12 RX ORDER — AMLODIPINE BESYLATE 10 MG/1
10 TABLET ORAL DAILY
Qty: 90 TAB | Refills: 4 | Status: SHIPPED | OUTPATIENT
Start: 2021-03-12 | End: 2022-04-12

## 2021-06-16 LAB
ALBUMIN SERPL-MCNC: 4.1 G/DL (ref 3.7–4.7)
ALBUMIN/GLOB SERPL: 1.1 {RATIO} (ref 1.2–2.2)
ALP SERPL-CCNC: 71 IU/L (ref 48–121)
ALT SERPL-CCNC: 15 IU/L (ref 0–44)
AST SERPL-CCNC: 12 IU/L (ref 0–40)
BASOPHILS # BLD AUTO: 0 X10E3/UL (ref 0–0.2)
BASOPHILS NFR BLD AUTO: 1 %
BILIRUB SERPL-MCNC: 1 MG/DL (ref 0–1.2)
BUN SERPL-MCNC: 17 MG/DL (ref 8–27)
BUN/CREAT SERPL: 12 (ref 10–24)
CALCIUM SERPL-MCNC: 9 MG/DL (ref 8.6–10.2)
CHLORIDE SERPL-SCNC: 108 MMOL/L (ref 96–106)
CHOLEST SERPL-MCNC: 160 MG/DL (ref 100–199)
CO2 SERPL-SCNC: 25 MMOL/L (ref 20–29)
CREAT SERPL-MCNC: 1.44 MG/DL (ref 0.76–1.27)
EOSINOPHIL # BLD AUTO: 0 X10E3/UL (ref 0–0.4)
EOSINOPHIL NFR BLD AUTO: 1 %
ERYTHROCYTE [DISTWIDTH] IN BLOOD BY AUTOMATED COUNT: 14.1 % (ref 11.6–15.4)
EST. AVERAGE GLUCOSE BLD GHB EST-MCNC: 189 MG/DL
GLOBULIN SER CALC-MCNC: 3.7 G/DL (ref 1.5–4.5)
GLUCOSE SERPL-MCNC: 123 MG/DL (ref 65–99)
HBA1C MFR BLD: 8.2 % (ref 4.8–5.6)
HCT VFR BLD AUTO: 43.7 % (ref 37.5–51)
HDLC SERPL-MCNC: 53 MG/DL
HGB BLD-MCNC: 14 G/DL (ref 13–17.7)
IMM GRANULOCYTES # BLD AUTO: 0 X10E3/UL (ref 0–0.1)
IMM GRANULOCYTES NFR BLD AUTO: 0 %
LDLC SERPL CALC-MCNC: 90 MG/DL (ref 0–99)
LYMPHOCYTES # BLD AUTO: 1.8 X10E3/UL (ref 0.7–3.1)
LYMPHOCYTES NFR BLD AUTO: 26 %
MCH RBC QN AUTO: 28.9 PG (ref 26.6–33)
MCHC RBC AUTO-ENTMCNC: 32 G/DL (ref 31.5–35.7)
MCV RBC AUTO: 90 FL (ref 79–97)
MONOCYTES # BLD AUTO: 0.5 X10E3/UL (ref 0.1–0.9)
MONOCYTES NFR BLD AUTO: 8 %
NEUTROPHILS # BLD AUTO: 4.3 X10E3/UL (ref 1.4–7)
NEUTROPHILS NFR BLD AUTO: 64 %
PLATELET # BLD AUTO: 201 X10E3/UL (ref 150–450)
POTASSIUM SERPL-SCNC: 4.3 MMOL/L (ref 3.5–5.2)
PROT SERPL-MCNC: 7.8 G/DL (ref 6–8.5)
RBC # BLD AUTO: 4.85 X10E6/UL (ref 4.14–5.8)
SODIUM SERPL-SCNC: 145 MMOL/L (ref 134–144)
TRIGL SERPL-MCNC: 92 MG/DL (ref 0–149)
VLDLC SERPL CALC-MCNC: 17 MG/DL (ref 5–40)
WBC # BLD AUTO: 6.6 X10E3/UL (ref 3.4–10.8)

## 2021-06-18 ENCOUNTER — OFFICE VISIT (OUTPATIENT)
Dept: INTERNAL MEDICINE CLINIC | Age: 76
End: 2021-06-18
Payer: MEDICARE

## 2021-06-18 VITALS
HEIGHT: 67 IN | BODY MASS INDEX: 40.56 KG/M2 | RESPIRATION RATE: 16 BRPM | WEIGHT: 258.4 LBS | OXYGEN SATURATION: 96 % | DIASTOLIC BLOOD PRESSURE: 82 MMHG | SYSTOLIC BLOOD PRESSURE: 157 MMHG | HEART RATE: 94 BPM

## 2021-06-18 DIAGNOSIS — E11.21 TYPE 2 DIABETES MELLITUS WITH DIABETIC NEPHROPATHY, WITHOUT LONG-TERM CURRENT USE OF INSULIN (HCC): ICD-10-CM

## 2021-06-18 DIAGNOSIS — Z00.00 MEDICARE ANNUAL WELLNESS VISIT, SUBSEQUENT: Primary | ICD-10-CM

## 2021-06-18 PROCEDURE — 1101F PT FALLS ASSESS-DOCD LE1/YR: CPT | Performed by: INTERNAL MEDICINE

## 2021-06-18 PROCEDURE — G8536 NO DOC ELDER MAL SCRN: HCPCS | Performed by: INTERNAL MEDICINE

## 2021-06-18 PROCEDURE — G0439 PPPS, SUBSEQ VISIT: HCPCS | Performed by: INTERNAL MEDICINE

## 2021-06-18 PROCEDURE — G8510 SCR DEP NEG, NO PLAN REQD: HCPCS | Performed by: INTERNAL MEDICINE

## 2021-06-18 PROCEDURE — G8754 DIAS BP LESS 90: HCPCS | Performed by: INTERNAL MEDICINE

## 2021-06-18 PROCEDURE — G8753 SYS BP > OR = 140: HCPCS | Performed by: INTERNAL MEDICINE

## 2021-06-18 PROCEDURE — G8427 DOCREV CUR MEDS BY ELIG CLIN: HCPCS | Performed by: INTERNAL MEDICINE

## 2021-06-18 PROCEDURE — G8417 CALC BMI ABV UP PARAM F/U: HCPCS | Performed by: INTERNAL MEDICINE

## 2021-06-18 RX ORDER — BLOOD SUGAR DIAGNOSTIC
STRIP MISCELLANEOUS
Qty: 100 STRIP | Refills: 1 | Status: SHIPPED | OUTPATIENT
Start: 2021-06-18 | End: 2022-02-08 | Stop reason: SDUPTHER

## 2021-06-18 NOTE — PROGRESS NOTES
This is the Subsequent Medicare Annual Wellness Exam, performed 12 months or more after the Initial AWV or the last Subsequent AWV    I have reviewed the patient's medical history in detail and updated the computerized patient record. History     Patient Active Problem List   Diagnosis Code    Osteoarthritis M19.90    HTN (hypertension) I10    Diabetes mellitus ([facility] [address] E11.9    Obesity E66.9    Mild renal insufficiency N28.9    History of colon polyps Z86.010    Microalbuminuria R80.9    Obesity, morbid (HCC) E66.01    Type 2 diabetes with nephropathy ([facility] [address] E11.21    BMI 40.0-44.9, adult ([facility] [address] Z68.41     Past Medical History:   Diagnosis Date    Arthritis     knees, feet, hands    Diabetes mellitus ([facility] [address]     Does use hearing aid     Hearing loss     Has seen Dr. Josiah Martinez Hypertension     Obesity     Vertigo     BPPV; Has seen ENT Dr. Isabella Valerio, then Dr. Danny Lazo      Past Surgical History:   Procedure Laterality Date    COLONOSCOPY N/A 7/8/2019    COLON[facility]lyce Thurman [facility]COPY    COLONOSCOPY,DIAGNOSTIC  7/8/2019         HX CATARACT REMOVAL Bilateral ~ 2009    Bilateral    HX ROTATOR CUFF REPAIR Left 12/13/2013    shoulder    HX TONSILLECTOMY  1950    as a child    [place] COLONOSCOPY FLX DX W/COLLJ SPEC WHEN PFRMD  5/15/2014    Dr. Erich Lorenzo     Current Outpatient Medications   Medication Sig Dispense Refill    OneTouch Verio test strips strip TEST BLOOD SUGAR EVERY DAY MUST ESTABLISH WITH NEW ENDOCRINOLOGIST OR CONTACT PCP FOR FURTHER REFILLS 100 Strip 1    glipiZIDE (GLUCOTROL) 5 mg tablet TAKE 1 TABLET TWICE A  Tab 3    losartan (COZAAR) 50 mg tablet TAKE 1 TABLET TWICE A  Tab 3    amLODIPine (NORVASC) 10 mg tablet Take 1 Tab by mouth daily. 90 Tab 4    Ozempic 1 mg/dose (2 mg/1.5 mL) sub-q pen INJECT 1 MG UNDER THE SKIN EVERY 7 DAYS 9 mL 3    canagliflozin (INVOKANA) 300 mg tablet Take 1 Tab by mouth Daily (before breakfast).  90 Tab 3    metFORMIN (GLUCOPHAGE) 1,000 mg tablet Take 1 Tab by mouth two (2) times daily (with meals). 180 Tab 3    diclofenac (VOLTAREN) 1 % gel Apply 2 g to affected area four (4) times daily. 100 g 3    sildenafil citrate (VIAGRA) 50 mg tablet Take 1 Tab by mouth as needed. 30 Tab 10    BD ULTRA-FINE MINI PEN NEEDLE 31 gauge x 3/16\" ndle USE WITH INSULIN INJECTIONS DAILY 100 Pen Needle 3    miscellaneous medical supply misc Order for diabetic shoes and inserts. 1 Each 0    UNIFINE PENTIPS PLUS 31 gauge x 1/4\" ndle FOR USE WITH INSULIN INJECT IONS DAILY 100 Each 3    Insulin Needles, Disposable, (PEN NEEDLE) 32 x 5/32 \" ndle 1 each by SubCUTAneous route daily. 100 each 3     Allergies   Allergen Reactions    Ace Inhibitors Cough       Family History   Problem Relation Age of Onset    Hypertension Mother     Cancer Mother         lung    Cancer Father 39        prostate    Other Maternal Grandmother          as result of complications of childbirth when baby was 8 mos old   Ciprius.Deck Cancer Sister         half sister-breast CA in remission    Other Maternal Grandfather         aneurysm    Heart Disease Paternal Grandmother      Social History     Tobacco Use    Smoking status: Former Smoker     Packs/day: 1.00     Years: 5.00     Pack years: 5.00     Quit date: 1970     Years since quittin.4    Smokeless tobacco: Never Used   Substance Use Topics    Alcohol use: No       Depression Risk Factor Screening:     3 most recent PHQ Screens 2021   Little interest or pleasure in doing things Not at all   Feeling down, depressed, irritable, or hopeless Not at all   Total Score PHQ 2 0       Alcohol Risk Factor Screening (MALE > 65): Do you average more 1 drink per night or more than 7 drinks a week: No    In the past three months have you have had more than 4 drinks containing alcohol on one occasion: No      Functional Ability and Level of Safety:   Hearing: The patient wears hearing aids.     Activities of Daily Living: The home contains: no safety equipment. Patient does total self care    Ambulation: with no difficulty    Fall Risk:  Fall Risk Assessment, last 12 mths 6/18/2021   Able to walk? Yes   Fall in past 12 months? 0   Do you feel unsteady? 0   Are you worried about falling 0       Abuse Screen:  Patient is not abused    Cognitive Screening   Has your family/caregiver stated any concerns about your memory: no  Cognitive Screening: Normal    Patient Care Team   Patient Care Team:  Ramo Goodman MD as PCP - General (Internal Medicine)  Ramo Goodman MD as PCP - REHABILITATION [facility] Empaneled Provider  Tyrese Mcdonnell MD (Ophthalmology)  Jonathan Rome MD (Gastroenterology)  Yossi Cody MD as Consulting Provider (Endocrinology)  Neela Rabago MD (Orthopedic Surgery)  Dr. Mehnaz Pleitez (Urology)  Dr. Brody North ([address])  Gwen Miranda DPM (Podiatry)    Assessment/Plan   Education and counseling provided:  Are appropriate based on today's review and evaluation        Health Maintenance Due   Topic Date Due    Hepatitis C Screening  Never done    Shingrix Vaccine Age 50> (1 of 2) Never done    MICROALBUMIN Q1  05/29/2021    Medicare Yearly Exam  06/03/2021       Discussed the patient's BMI with him. The BMI follow up plan is as follows:     dietary management education, guidance, and counseling  encourage exercise  monitor weight  prescribed dietary intake    An After Visit Summary was printed and given to the patient.

## 2021-06-18 NOTE — PROGRESS NOTES
SUBJECTIVE:   Mr. Aurora Baires is a 66 y.o. male who is here for follow up of routine medical issues. Previously he saw Dr. Jeff Javier. Chief Complaint   Patient presents with    Diabetes     6 month follow up     \"That medicine for the shingles you gave me seemed to do the trick. It started working right away\"  No residual pain. \"My diet is off; Haven't been always taking meds. \"  He doesn't like fingersticks, and is interested in continuous glucose monitor. Gluc 136 this morning. He has been diabetic since about 2005. He no longer sees Dr. Yesenia Anguiano. He has had eye exam with Dr. Yosvany Evans, has some sign of retinopathy. His BP at home is 129/83 this morning. Knee pain L > R. Continues to have knee pain in L knee--Previously he saw Dr. Tacos Valladares. \"he gave me a cortisone shot and said it was bone-on-bone there. \"    Also stiffness in AM for a little while, a few minutes (less than 15)--back, hips. \"Gets better once I get moving around. \"   At this time, he is otherwise doing well and has brought no other complaints to my attention today. For a list of the medical issues addressed today, see the assessment and plan below. PMH:   Past Medical History:   Diagnosis Date    Arthritis     knees, feet, hands    Diabetes mellitus ([address]     Does use hearing aid     Hearing loss     Has seen Dr. Ayana Garcia Hypertension     Obesity        Past Surgical History:   Procedure Laterality Date    COLONOSCOPY N/A 7/8/2019    COL[facility]aushal Arizmendi [facility] at [facility] ENDOSCOPY    COLONOSCOPY,DIAGNOSTIC  7/8/2019         HX CATARACT REMOVAL Bilateral ~ 2009    Bilateral    HX ROTATOR CUFF REPAIR Left 12/13/2013    shoulder    HX TONSILLECTOMY  1950    as a child    [place] COLONOSCOPY FLX DX W/COLLJ SPEC WHEN PFRMD  5/15/2014    Dr. Ingris Pacheco       All: He is allergic to ace inhibitors.    Current Outpatient Medications   Medication Sig    OneTouch Verio test strips strip TEST BLOOD SUGAR EVERY DAY MUST ESTABLISH WITH NEW ENDOCRINOLOGIST OR CONTACT PCP FOR FURTHER REFILLS    glipiZIDE (GLUCOTROL) 5 mg tablet TAKE 1 TABLET TWICE A DAY    losartan (COZAAR) 50 mg tablet TAKE 1 TABLET TWICE A DAY    amLODIPine (NORVASC) 10 mg tablet Take 1 Tab by mouth daily.  Ozempic 1 mg/dose (2 mg/1.5 mL) sub-q pen INJECT 1 MG UNDER THE SKIN EVERY 7 DAYS    canagliflozin (INVOKANA) 300 mg tablet Take 1 Tab by mouth Daily (before breakfast).  metFORMIN (GLUCOPHAGE) 1,000 mg tablet Take 1 Tab by mouth two (2) times daily (with meals).  diclofenac (VOLTAREN) 1 % gel Apply 2 g to affected area four (4) times daily.  sildenafil citrate (VIAGRA) 50 mg tablet Take 1 Tab by mouth as needed.  BD ULTRA-FINE MINI PEN NEEDLE 31 gauge x 3/16\" ndle USE WITH INSULIN INJECTIONS DAILY    miscellaneous medical supply misc Order for diabetic shoes and inserts.  dapagliflozin (FARXIGA) 10 mg tab tablet Take 1 Tab by mouth daily.  UNIFINE PENTIPS PLUS 31 gauge x 1/4\" ndle FOR USE WITH INSULIN INJECT IONS DAILY    Insulin Needles, Disposable, (PEN NEEDLE) 32 x 5/32 \" ndle 1 each by SubCUTAneous route daily. No current facility-administered medications for this visit. FH:  Mother had lung cancer, also had HTN. Father of  of prostate cancer age 39. His sister has had breast cancer. His family history includes Heart Disease in his paternal grandmother; Hypertension in his mother; and brain aneurysm in his maternal grandfather. His maternal grandmother  of complications of childbirth. SH: He is retired . [employer] . . He reports that he quit smoking about 51 years ago. He has a 5.00 pack-year smoking history. He has never used smokeless tobacco. He reports that he does not drink alcohol and does not use drugs. ROS: See above; Complete ROS otherwise negative.      OBJECTIVE:   Vitals:   Visit Vitals  BP (!) 157/82 (BP 1 Location: Left arm, BP Patient Position: Sitting)   Pulse 94   Resp 16   Ht 5' 7\" (1.702 m)   Wt 258 lb 6.4 oz (117.2 kg)   SpO2 96%   BMI 40.47 kg/m²      Gen: Pleasant, obese 66 y.o. AA male in NAD. HEENT: PERRLA. EOMI. OP moist and pink.  Neck: Supple.  No LAD.  HEART: RRR, No M/G/R.   LUNGS: CTAB No W/R.   ABDOMEN: S, NT, ND, BS+.   EXTREMITIES: Warm. No C/C/E. MUSCULOSKELETAL: Normal ROM, muscle strength 5/5 all groups. NEURO: Alert and oriented x 3.  Cranial nerves grossly intact.  No focal sensory or motor deficits noted. SKIN: Warm. Dry. No rashes or other lesions noted. Lab Results   Component Value Date/Time    Sodium 145 (H) 06/15/2021 10:49 AM    Potassium 4.3 06/15/2021 10:49 AM    Chloride 108 (H) 06/15/2021 10:49 AM    CO2 25 06/15/2021 10:49 AM    Glucose 123 (H) 06/15/2021 10:49 AM    BUN 17 06/15/2021 10:49 AM    Creatinine 1.44 (H) 06/15/2021 10:49 AM    BUN/Creatinine ratio 12 06/15/2021 10:49 AM    GFR est AA 54 (L) 06/15/2021 10:49 AM    GFR est non-AA 47 (L) 06/15/2021 10:49 AM    Calcium 9.0 06/15/2021 10:49 AM    Bilirubin, total 1.0 06/15/2021 10:49 AM    ALT (SGPT) 15 06/15/2021 10:49 AM    Alk. phosphatase 71 06/15/2021 10:49 AM    Protein, total 7.8 06/15/2021 10:49 AM    Albumin 4.1 06/15/2021 10:49 AM    A-G Ratio 1.1 (L) 06/15/2021 10:49 AM       Lab Results   Component Value Date/Time    Cholesterol, total 160 06/15/2021 10:49 AM    HDL Cholesterol 53 06/15/2021 10:49 AM    LDL, calculated 90 06/15/2021 10:49 AM    LDL, calculated 73 05/29/2020 10:03 AM    Triglyceride 92 06/15/2021 10:49 AM        Lab Results   Component Value Date/Time    Hemoglobin A1c 8.2 (H) 06/15/2021 10:49 AM       Lab Results   Component Value Date/Time    WBC 6.6 06/15/2021 10:49 AM    HGB 14.0 06/15/2021 10:49 AM    HCT 43.7 06/15/2021 10:49 AM    PLATELET 655 56/28/4469 10:49 AM    MCV 90 06/15/2021 10:49 AM         ASSESSMENT/ PLAN:     1. HTN (hypertension): Typically normal at home. Didn't take med today also. Likely has \"white coat syndrome\".    2. Osteoarthritis: L knee pain. Inhibits ambulation at times--he has a handicap placard. Continue follow up with orthopedics. May take more tylenol (up to limit of 4000 mg / day)  3. Mild renal insufficiency: May be developing some CKD due to DM +/- HTN. For now, work on DM control and continue ARB. 4. Diabetes mellitus: Not controlled. 5. Obesity: Diet and exercise. Follow-up and Dispositions    · Return in about 6 months (around 12/18/2021) for DM; Also set up visit with Arti Jara, pharmacist, to [facility] or [facility].

## 2021-06-18 NOTE — PATIENT INSTRUCTIONS
Medicare Wellness Visit, Male The best way to live healthy is to have a lifestyle where you eat a well-balanced diet, exercise regularly, limit alcohol use, and quit all forms of tobacco/nicotine, if applicable. Regular preventive services are another way to keep healthy. Preventive services (vaccines, screening tests, monitoring & exams) can help personalize your care plan, which helps you manage your own care. Screening tests can find health problems at the earliest stages, when they are easiest to treat. [facility] follows the current, evidence-based guidelines published by the [facility] Ruel Jimmy ([facility]) when recommending preventive services for our patients. Because we follow these guidelines, sometimes recommendations change over time as research supports it. (For example, a prostate screening blood test is no longer routinely recommended for men with no symptoms). Of course, you and your doctor may decide to screen more often for some diseases, based on your risk and co-morbidities (chronic disease you are already diagnosed with). Preventive services for you include: - Medicare offers their members a free annual wellness visit, which is time for you and your primary care provider to discuss and plan for your preventive service needs. Take advantage of this benefit every year! 
-All adults over age 72 should receive the recommended pneumonia vaccines. Current USPSTF guidelines recommend a series of two vaccines for the best pneumonia protection.  
-All adults should have a flu vaccine yearly and tetanus vaccine every 10 years. 
-All adults age 48 and older should receive the shingles vaccines (series of two vaccines).       
-All adults age 38-68 who are overweight should have a diabetes screening test once every three years.  
-Other screening tests & preventive services for persons with diabetes include: an eye exam to screen for diabetic retinopathy, a kidney function test, a foot exam, and stricter control over your cholesterol.  
-Cardiovascular screening for adults with routine risk involves an electrocardiogram (ECG) at intervals determined by the provider.  
-Colorectal cancer screening should be done for adults age 54-65 with no increased risk factors for colorectal cancer. There are a number of acceptable methods of screening for this type of cancer. Each test has its own benefits and drawbacks. Discuss with your provider what is most appropriate for you during your annual wellness visit. The different tests include: colonoscopy (considered the best screening method), a fecal occult blood test, a fecal DNA test, and sigmoidoscopy. 
-All adults born between [facility] should be screened once for Hepatitis C. 
-An Abdominal Aortic Aneurysm (AAA) Screening is recommended for men age 73-68 who has ever smoked in their lifetime. Here is a list of your current Health Maintenance items (your personalized list of preventive services) with a due date: 
Health Maintenance Due Topic Date Due  
 Hepatitis C Test  Never done  Shingles Vaccine (1 of 2) Never done  Albumin Urine Test  05/29/2021

## 2021-07-05 NOTE — PROGRESS NOTES
Pharmacy Progress Note - Medication Management     S/O: Mr. Laisha Croft 64 y.o. male, with a PMH of T2DM w/ nephropathy, HTN, Osteoarthritis, referred by Dr. Antwan Avalos MD for CGM evaluation.     - Last A1c was 8.2% (June 2021), 7.8% (May 2020)     Current DM regimen:  Glipizide 5 mg BID  Invokana 300 mg daily  Metformin 1 gm BID  Ozempic 1 mg weekly Sunday    Losartan 50 mg BID  Norvasc 10 mg daily    Reports hx of using Megan 10 day CGM system ~ 2 yrs ago. Stopped d/t cost  Interested in CGM again if possible. Reports to checking SMBG at home. Not very consistent. [facility] Medicare Advantage. Denies HA/CP  Reports white coat syndrome. Reports home readings - SBP runs 120-130s. Wt Readings from Last 3 Encounters:   07/06/21 257 lb (116.6 kg)   06/18/21 258 lb 6.4 oz (117.2 kg)   01/31/20 270 lb (122.5 kg)     BP Readings from Last 3 Encounters:   07/06/21 (!) 171/82   06/18/21 (!) 157/82   06/02/20 126/77     Pulse Readings from Last 3 Encounters:   07/06/21 86   06/18/21 94   06/02/20 76       Past Medical History:   Diagnosis Date    Arthritis     knees, feet, hands    Diabetes mellitus ([address]     Does use hearing aid     Hearing loss     Has seen Dr. Devyn Mello Hypertension     Obesity     Vertigo     BPPV; Has seen ENT Dr. Anderson Stein, then Dr. Cleotilde Severance   Allergen Reactions    Ace Inhibitors Cough     Current Outpatient Medications   Medication Sig    OneTouch Verio test strips strip TEST BLOOD SUGAR EVERY DAY MUST ESTABLISH WITH NEW ENDOCRINOLOGIST OR CONTACT PCP FOR FURTHER REFILLS    glipiZIDE (GLUCOTROL) 5 mg tablet TAKE 1 TABLET TWICE A DAY    losartan (COZAAR) 50 mg tablet TAKE 1 TABLET TWICE A DAY    amLODIPine (NORVASC) 10 mg tablet Take 1 Tab by mouth daily.  Ozempic 1 mg/dose (2 mg/1.5 mL) sub-q pen INJECT 1 MG UNDER THE SKIN EVERY 7 DAYS    canagliflozin (INVOKANA) 300 mg tablet Take 1 Tab by mouth Daily (before breakfast).     metFORMIN (GLUCOPHAGE) 1,000 mg tablet Take 1 Tab by mouth two (2) times daily (with meals).  diclofenac (VOLTAREN) 1 % gel Apply 2 g to affected area four (4) times daily.  sildenafil citrate (VIAGRA) 50 mg tablet Take 1 Tab by mouth as needed.  BD ULTRA-FINE MINI PEN NEEDLE 31 gauge x 3/16\" ndle USE WITH INSULIN INJECTIONS DAILY    miscellaneous medical supply misc Order for diabetic shoes and inserts. No current facility-administered medications for this visit. Lab Results   Component Value Date/Time    WBC 6.6 06/15/2021 10:49 AM    HGB 14.0 06/15/2021 10:49 AM    HCT 43.7 06/15/2021 10:49 AM    PLATELET 236 25/86/3212 10:49 AM    MCV 90 06/15/2021 10:49 AM     Lab Results   Component Value Date/Time    Sodium 145 (H) 06/15/2021 10:49 AM    Potassium 4.3 06/15/2021 10:49 AM    Chloride 108 (H) 06/15/2021 10:49 AM    CO2 25 06/15/2021 10:49 AM    Glucose 123 (H) 06/15/2021 10:49 AM    BUN 17 06/15/2021 10:49 AM    Creatinine 1.44 (H) 06/15/2021 10:49 AM    BUN/Creatinine ratio 12 06/15/2021 10:49 AM    GFR est AA 54 (L) 06/15/2021 10:49 AM    GFR est non-AA 47 (L) 06/15/2021 10:49 AM    Calcium 9.0 06/15/2021 10:49 AM    Bilirubin, total 1.0 06/15/2021 10:49 AM    Alk. phosphatase 71 06/15/2021 10:49 AM    Protein, total 7.8 06/15/2021 10:49 AM    Albumin 4.1 06/15/2021 10:49 AM    A-G Ratio 1.1 (L) 06/15/2021 10:49 AM    ALT (SGPT) 15 06/15/2021 10:49 AM     Lab Results   Component Value Date/Time    Microalb/Creat ratio (ug/mg creat.) 63 (H) 05/29/2020 10:03 AM     Lab Results   Component Value Date/Time    Hemoglobin A1c 8.2 (H) 06/15/2021 10:49 AM    Hemoglobin A1c 7.8 (H) 05/29/2020 10:03 AM    Hemoglobin A1c 9.6 (H) 01/13/2020 09:18 AM       Estimated Creatinine Clearance: 51.6 mL/min (A) (by C-G formula based on SCr of 1.44 mg/dL (H)). A/P:  - Per ADA, Pt's last A1c was above goal <7%. - Recommend for patient to monitor home BP. Bring readings to future visit. - Will see if we can get Megan 2 CGM covered. - Pt endorsed understanding of the information provided. All questions were answered at this time. No orders of the defined types were placed in this encounter. Thank you for the consult,  Rubi Paulino, PharmD, BCACP, [facility]      Medication reconciliation was completed today. There are no discontinued medications. For [place] in place:  Yes   Recommendation Provided To: Patient/Caregiver: 1 via In person   Intervention Detail: New Rx: 2, reason: Improve Adherence and Patient Preference   Total # of Interventions Accepted: 2   Time Spent (min): 30

## 2021-07-06 ENCOUNTER — OFFICE VISIT (OUTPATIENT)
Dept: INTERNAL MEDICINE CLINIC | Age: 76
End: 2021-07-06

## 2021-07-06 VITALS
DIASTOLIC BLOOD PRESSURE: 82 MMHG | HEIGHT: 67 IN | SYSTOLIC BLOOD PRESSURE: 171 MMHG | WEIGHT: 257 LBS | HEART RATE: 86 BPM | OXYGEN SATURATION: 97 % | BODY MASS INDEX: 40.34 KG/M2

## 2021-07-06 DIAGNOSIS — E11.21 TYPE 2 DIABETES WITH NEPHROPATHY (HCC): Primary | ICD-10-CM

## 2021-07-19 ENCOUNTER — DOCUMENTATION ONLY (OUTPATIENT)
Dept: INTERNAL MEDICINE CLINIC | Age: 76
End: 2021-07-19

## 2021-07-19 NOTE — PROGRESS NOTES
Pharmacy Progress Note - CGM Access    Megan 2 CGM DME form submitted to [employer] [facility] for Mr. Tin Mendieta 64 y.o. [address]. Verdict pending. Thank you for the consult,  Maday CyrD, BCACP, [address] in place: Yes   Recommendation Provided To:  Other: 1   Intervention Detail: New Rx: 2, reason: Needs Additional Therapy and Patient Preference

## 2021-07-20 ENCOUNTER — TELEPHONE (OUTPATIENT)
Dept: INTERNAL MEDICINE CLINIC | Age: 76
End: 2021-07-20

## 2021-07-20 NOTE — TELEPHONE ENCOUNTER
Pharmacy Progress Note - Telephone Encounter    S/O: Mr. Michael Benítez 64 y.o. male, referred by Dr. Renu Tamayo MD, was contacted via an outbound telephone call to discuss his CGM today. Verified patients identifiers (name & ) per HIPAA policy. A/P:  - Discuss updates w/ patient. Megan CGM order submitted to [employer]. Verdict pending.   - Patient endorses understanding to the provided information. All questions answered at this time. Thank you,  [facility], PharmD, BCACP, [address] in place:  Yes   Recommendation Provided To: Patient/Caregiver: 1 via Telephone

## 2021-10-08 ENCOUNTER — TELEPHONE (OUTPATIENT)
Dept: INTERNAL MEDICINE CLINIC | Age: 76
End: 2021-10-08

## 2021-10-08 NOTE — TELEPHONE ENCOUNTER
Given fever and cough, I would recommend staying home as he could still have COVID, despite negative rapid test. Also he could have some other communicable virus.    BJF

## 2021-10-08 NOTE — TELEPHONE ENCOUNTER
Identified patient 2 identifiers verified. Patient has a dry cough. Rapid test was negative, waiting on the other test results for COVID. Patient has had a low grade fever and runny nose. No traveling, no exposure, no one else in the house has had symptoms. [address] daughter had the same symptoms and tested negative for COVID 10/4/21. Patient wants to know if  He still has to be in quarantine? He has a wedding too attend on Saturday.

## 2021-10-08 NOTE — TELEPHONE ENCOUNTER
#[phone] pt states that he has COVID symptoms. He went to [facility] and had a negative rapid test.   Pt then did the long COVID test and is waiting on results. Pt states he has a wedding to go to tomorrow and needs advise as to what to do. He has a cough and other symptoms he will discuss with you. Please call to advise.

## 2021-10-11 ENCOUNTER — TELEPHONE (OUTPATIENT)
Dept: INTERNAL MEDICINE CLINIC | Age: 76
End: 2021-10-11

## 2021-10-11 NOTE — TELEPHONE ENCOUNTER
Advice for COVID:  Drink plenty of fluids. Get plenty of rest.  Tylenol ok for fever and headaches. Take Vit C and Zinc.  Robitissin DM or Mucinex DM okay for cough. Get a pulse oximeter and thermometer  ER if O2 sat <90, temp > 101.5, HR > 125.   BF

## 2021-10-11 NOTE — TELEPHONE ENCOUNTER
Placed call to pt. Two pt identifiers confirmed. Message from Dr. Juan Lara relayed to pt. Pt verbalized understanding of information discussed w/ no further questions at this time.

## 2021-10-11 NOTE — TELEPHONE ENCOUNTER
Patient states he needs a call back in reference to going to [facility] on Friday, 10/8/21 & getting Testing for COVID. Patient states he tested Negative on the Rapid Test but was advised on PCR test that he was Positive. Patient states he would like to discuss Medication treatments. Please call to discuss & advise.  Thank you

## 2021-10-13 ENCOUNTER — TELEPHONE (OUTPATIENT)
Dept: INTERNAL MEDICINE CLINIC | Age: 76
End: 2021-10-13

## 2021-10-13 NOTE — TELEPHONE ENCOUNTER
----- Message from Jose Johnston sent at 10/13/2021 12:36 PM EDT -----  Regarding: DR Keen/telephone  General Message/Vendor Calls    Caller's first and last name:Niall Banks Blake      Reason for call:Monoclonal Antibody treatment      Callback required yes/no and why:yes to discuss treatment      Best contact number(s):[phone]      Details to clarify the request:Pt is requesting a call back to discuss Monoclonal Antibody Treatment.  Pt advised he tested COVID positive on 10/10/21 Vasiliy and is concerned due to underlying health conditions, wife's COVID positive and small grandchildren in the home      Message from [facility]

## 2021-10-14 NOTE — TELEPHONE ENCOUNTER
Okay, let's try to get him in to one of the [facility] centers for infusion. There is a prefilled form somewhere for me to sign--completed.    BJF

## 2021-10-18 ENCOUNTER — VIRTUAL VISIT (OUTPATIENT)
Dept: INTERNAL MEDICINE CLINIC | Age: 76
End: 2021-10-18
Payer: MEDICARE

## 2021-10-18 DIAGNOSIS — Z11.59 ENCOUNTER FOR HEPATITIS C SCREENING TEST FOR LOW RISK PATIENT: ICD-10-CM

## 2021-10-18 DIAGNOSIS — M17.9 OSTEOARTHRITIS OF KNEE, UNSPECIFIED LATERALITY, UNSPECIFIED OSTEOARTHRITIS TYPE: ICD-10-CM

## 2021-10-18 DIAGNOSIS — I10 ESSENTIAL HYPERTENSION: ICD-10-CM

## 2021-10-18 DIAGNOSIS — E66.01 OBESITY, CLASS III, BMI 40-49.9 (MORBID OBESITY) (HCC): ICD-10-CM

## 2021-10-18 DIAGNOSIS — E11.21 TYPE 2 DIABETES MELLITUS WITH DIABETIC NEPHROPATHY, WITHOUT LONG-TERM CURRENT USE OF INSULIN (HCC): Primary | ICD-10-CM

## 2021-10-18 PROCEDURE — G8427 DOCREV CUR MEDS BY ELIG CLIN: HCPCS | Performed by: INTERNAL MEDICINE

## 2021-10-18 PROCEDURE — G8432 DEP SCR NOT DOC, RNG: HCPCS | Performed by: INTERNAL MEDICINE

## 2021-10-18 PROCEDURE — 1101F PT FALLS ASSESS-DOCD LE1/YR: CPT | Performed by: INTERNAL MEDICINE

## 2021-10-18 PROCEDURE — G8756 NO BP MEASURE DOC: HCPCS | Performed by: INTERNAL MEDICINE

## 2021-10-18 PROCEDURE — G8536 NO DOC ELDER MAL SCRN: HCPCS | Performed by: INTERNAL MEDICINE

## 2021-10-18 PROCEDURE — G8417 CALC BMI ABV UP PARAM F/U: HCPCS | Performed by: INTERNAL MEDICINE

## 2021-10-18 PROCEDURE — 99214 OFFICE O/P EST MOD 30 MIN: CPT | Performed by: INTERNAL MEDICINE

## 2021-10-18 NOTE — PROGRESS NOTES
Yonathan Jackman is a 64 y.o. male  HIPAA verified by two patient identifiers. Health Maintenance Due   Topic    Hepatitis C Screening     Shingrix Vaccine Age 50> (1 of 2)    MICROALBUMIN Q1     Flu Vaccine (1)    Foot Exam Q1      Chief Complaint   Patient presents with    Follow-up     4 month follow up    Diabetes     Patient-Reported Vitals 10/18/2021   Patient-Reported Weight 245   Patient-Reported Height 57   Patient-Reported Pulse 81   Patient-Reported Temperature 97.7   Patient-Reported SpO2 95   Patient-Reported Systolic  580   Patient-Reported Diastolic 61       Pain Scale: /10  Pain Location:             1. Have you been to the ER, urgent care clinic since your last visit? Hospitalized since your last visit? No    2. Have you seen or consulted any other health care providers outside of the [address] [place] [address] since your last visit? Include any pap smears or colon screening.  No

## 2021-10-18 NOTE — PROGRESS NOTES
Ben Cohen is a 64 y.o. male who was seen by synchronous (real-time) audio-video technology on 10/18/2021. Consent: Ben Cohen, who was seen by synchronous (real-time) audio-video technology, and/or his healthcare decision maker, is aware that this patient-initiated, Telehealth encounter on 10/18/2021 is a billable service, with coverage as determined by his insurance carrier. He is aware that he may receive a bill and has provided verbal consent to proceed: Yes. Subjective:   Ben Cohen is a 64 y.o. male who was seen for Follow-up (4 month follow up) and Diabetes    SUBJECTIVE:   Mr. Ben Cohen is a 64 y.o. male who is here for follow up of routine medical issues. Previously he saw Dr. Killian Merritt. Chief Complaint   Patient presents with    Follow-up     4 month follow up    Diabetes       He has COVID-19, contracted about 9 days ago. \"Everyone in my household has it. \" His fever has subsided, and he is feeling better. Vertigo \"doing fine\": He has been to see Dr. Judith Mcleod. Now seeing Dr. Charlotte Genao for retinal screening. He had a bout of shingles in February 2021, with no residual pain. Gluc 94 this morning. He has been diabetic since about 2005. He no longer sees Dr. Temi Gunderson. His BP at home is 129/83 this morning. Knee pain L > R. Continues to have knee pain in L knee--Previously he saw Dr. Erum Radford. \"he gave me a cortisone shot and said it was bone-on-bone there. \"    Also stiffness in AM for a little while, a few minutes (less than 15)--back, hips. \"Gets better once I get moving around. \"   At this time, he is otherwise doing well and has brought no other complaints to my attention today. For a list of the medical issues addressed today, see the assessment and plan below.     PMH:   Past Medical History:   Diagnosis Date    Arthritis     knees, feet, hands    Diabetes mellitus ([address]     Does use hearing aid     Hearing loss     Has seen Dr. Jackelin Ariza Hypertension     Obesity  Vertigo     BPPV; Has seen ENT Dr. Hugo Anderson, then Dr. Ester Menon       Past Surgical History:   Procedure Laterality Date    COLONOSCOPY N/A 2019    COLONOSCOPY performed by Mandi Bell MD at [facility]   2019         HX CATARACT REMOVAL Bilateral ~     Bilateral    HX ROTATOR CUFF REPAIR Left 2013    shoulder    HX TONSILLECTOMY  1948    as a child    [place] COLONOSCOPY FLX DX W/COLLJ SPEC WHEN PFRMD  5/15/2014    Dr. Aleyda Herrera       All: He is allergic to ace inhibitors. Current Outpatient Medications   Medication Sig    OneTouch Verio test strips strip TEST BLOOD SUGAR EVERY DAY MUST ESTABLISH WITH NEW ENDOCRINOLOGIST OR CONTACT PCP FOR FURTHER REFILLS    glipiZIDE (GLUCOTROL) 5 mg tablet TAKE 1 TABLET TWICE A DAY    losartan (COZAAR) 50 mg tablet TAKE 1 TABLET TWICE A DAY    amLODIPine (NORVASC) 10 mg tablet Take 1 Tab by mouth daily.  Ozempic 1 mg/dose (2 mg/1.5 mL) sub-q pen INJECT 1 MG UNDER THE SKIN EVERY 7 DAYS    canagliflozin (INVOKANA) 300 mg tablet Take 1 Tab by mouth Daily (before breakfast).  metFORMIN (GLUCOPHAGE) 1,000 mg tablet Take 1 Tab by mouth two (2) times daily (with meals).  diclofenac (VOLTAREN) 1 % gel Apply 2 g to affected area four (4) times daily. (Patient taking differently: Apply 2 g to affected area four (4) times daily as needed for Pain.)    sildenafil citrate (VIAGRA) 50 mg tablet Take 1 Tab by mouth as needed.  BD ULTRA-FINE MINI PEN NEEDLE 31 gauge x 3/16\" ndle USE WITH INSULIN INJECTIONS DAILY    miscellaneous medical supply misc Order for diabetic shoes and inserts. No current facility-administered medications for this visit. FH:  Mother had lung cancer, also had HTN. Father of  of prostate cancer age 39. His sister has had breast cancer. His family history includes Heart Disease in his paternal grandmother; Hypertension in his mother; and brain aneurysm in his maternal grandfather.   His maternal grandmother  of complications of childbirth. SH: He is retired . [employer] . . He reports that he quit smoking about 51 years ago. He has a 5.00 pack-year smoking history. He has never used smokeless tobacco. He reports that he does not drink alcohol and does not use drugs. ROS: See above; Complete ROS otherwise negative. OBJECTIVE:   Vitals: There were no vitals taken for this visit. Gen: Pleasant, obese 64 y.o. AA male in NAD. Lab Results   Component Value Date/Time    Sodium 145 (H) 06/15/2021 10:49 AM    Potassium 4.3 06/15/2021 10:49 AM    Chloride 108 (H) 06/15/2021 10:49 AM    CO2 25 06/15/2021 10:49 AM    Glucose 123 (H) 06/15/2021 10:49 AM    BUN 17 06/15/2021 10:49 AM    Creatinine 1.44 (H) 06/15/2021 10:49 AM    BUN/Creatinine ratio 12 06/15/2021 10:49 AM    GFR est AA 54 (L) 06/15/2021 10:49 AM    GFR est non-AA 47 (L) 06/15/2021 10:49 AM    Calcium 9.0 06/15/2021 10:49 AM    Bilirubin, total 1.0 06/15/2021 10:49 AM    ALT (SGPT) 15 06/15/2021 10:49 AM    Alk. phosphatase 71 06/15/2021 10:49 AM    Protein, total 7.8 06/15/2021 10:49 AM    Albumin 4.1 06/15/2021 10:49 AM    A-G Ratio 1.1 (L) 06/15/2021 10:49 AM       Lab Results   Component Value Date/Time    Cholesterol, total 160 06/15/2021 10:49 AM    HDL Cholesterol 53 06/15/2021 10:49 AM    LDL, calculated 90 06/15/2021 10:49 AM    LDL, calculated 73 2020 10:03 AM    Triglyceride 92 06/15/2021 10:49 AM        Lab Results   Component Value Date/Time    Hemoglobin A1c 8.2 (H) 06/15/2021 10:49 AM       Lab Results   Component Value Date/Time    WBC 6.6 06/15/2021 10:49 AM    HGB 14.0 06/15/2021 10:49 AM    HCT 43.7 06/15/2021 10:49 AM    PLATELET 633  10:49 AM    MCV 90 06/15/2021 10:49 AM         ASSESSMENT/ PLAN:     1. HTN (hypertension): Typically normal at home. Likely has \"white coat syndrome\". 2. Osteoarthritis: L knee pain. Inhibits ambulation at times--he has a handicap placard. Continue follow up with orthopedics. May take more tylenol (up to limit of 4000 mg / day)  3. Mild renal insufficiency: May be developing some CKD due to DM +/- HTN. For now, work on DM control and continue ARB. 4. Diabetes mellitus: Not controlled. As per Dr. Polo Mendieta. 5. Obesity: Diet and exercise. Follow-up and Dispositions    · Return in about 6 months (around 4/18/2022) for DM. Objective:   Vital Signs: (As obtained by patient/caregiver at home)  There were no vitals taken for this visit.      [INSTRUCTIONS:  \"[x]\" Indicates a positive item  \"[]\" Indicates a negative item  -- DELETE ALL ITEMS NOT EXAMINED]    Constitutional: [x] Appears well-developed and well-nourished [x] No apparent distress      [] Abnormal -     Mental status: [x] Alert and awake  [x] Oriented to person/place/time [x] Able to follow commands    [] Abnormal -     Eyes:   EOM    [x]  Normal    [] Abnormal -   Sclera  [x]  Normal    [] Abnormal -          Discharge [x]  None visible   [] Abnormal -     HENT: [x] Normocephalic, atraumatic  [] Abnormal -   [x] Mouth/Throat: Mucous membranes are moist    External Ears [x] Normal  [] Abnormal -    Neck: [x] No visualized mass [] Abnormal -     Pulmonary/Chest: [x] Respiratory effort normal   [x] No visualized signs of difficulty breathing or respiratory distress        [] Abnormal -      Musculoskeletal:   [x] Normal gait with no signs of ataxia         [x] Normal range of motion of neck        [] Abnormal -     Neurological:        [x] No Facial Asymmetry (Cranial nerve 7 motor function) (limited exam due to video visit)          [x] No gaze palsy        [] Abnormal -          Skin:        [x] No significant exanthematous lesions or discoloration noted on facial skin         [] Abnormal -            Psychiatric:       [x] Normal Affect [] Abnormal -        [x] No Hallucinations    Other pertinent observable physical exam findings:-        We discussed the expected course, resolution and complications of the diagnosis(es) in detail. Medication risks, benefits, costs, interactions, and alternatives were discussed as indicated. I advised him to contact the office if his condition worsens, changes or fails to improve as anticipated. He expressed understanding with the diagnosis(es) and plan. Dorothy Roberts is a 64 y.o. male who was evaluated by a video visit encounter for concerns as above. Patient identification was verified prior to start of the visit. A caregiver was present when appropriate. Due to this being a TeleHealth encounter (During [place]-06 public [facility] emergency), evaluation of the following organ systems was limited: Vitals/Constitutional/EENT/Resp/CV/GI//MS/Neuro/Skin/Heme-Lymph-Imm. Pursuant to the emergency declaration under the [facility], 1135 waiver authority and the [employer] Act, this Virtual  Visit was conducted, with patient's (and/or legal guardian's) consent, to reduce the patient's risk of exposure to COVID-19 and provide necessary medical care. Services were provided through a video synchronous discussion virtually to substitute for in-person clinic visit. Patient and provider were located at their individual homes.       Gretchen Singleton MD

## 2021-11-12 RX ORDER — ACYCLOVIR 800 MG/1
800 TABLET ORAL
Qty: 35 TABLET | Refills: 0 | Status: SHIPPED | OUTPATIENT
Start: 2021-11-12 | End: 2021-11-19

## 2021-12-24 RX ORDER — METFORMIN HYDROCHLORIDE 1000 MG/1
TABLET ORAL
Qty: 180 TABLET | Refills: 3 | Status: SHIPPED | OUTPATIENT
Start: 2021-12-24

## 2021-12-24 NOTE — TELEPHONE ENCOUNTER
Will forward refill for metformin to PCP as patient is no longer under Dr. Briana Nash care since he left our practice on 1/20/20.

## 2022-01-23 RX ORDER — SEMAGLUTIDE 1.34 MG/ML
INJECTION, SOLUTION SUBCUTANEOUS
Qty: 3 EACH | Refills: 3 | Status: SHIPPED | OUTPATIENT
Start: 2022-01-23

## 2022-02-08 ENCOUNTER — OFFICE VISIT (OUTPATIENT)
Dept: INTERNAL MEDICINE CLINIC | Age: 77
End: 2022-02-08
Payer: MEDICARE

## 2022-02-08 VITALS
RESPIRATION RATE: 16 BRPM | DIASTOLIC BLOOD PRESSURE: 82 MMHG | SYSTOLIC BLOOD PRESSURE: 156 MMHG | HEART RATE: 95 BPM | OXYGEN SATURATION: 97 % | WEIGHT: 256.4 LBS | HEIGHT: 67 IN | TEMPERATURE: 97 F | BODY MASS INDEX: 40.24 KG/M2

## 2022-02-08 DIAGNOSIS — I10 ESSENTIAL HYPERTENSION: ICD-10-CM

## 2022-02-08 DIAGNOSIS — E66.01 OBESITY, CLASS III, BMI 40-49.9 (MORBID OBESITY) (HCC): ICD-10-CM

## 2022-02-08 DIAGNOSIS — N28.9 MILD RENAL INSUFFICIENCY: ICD-10-CM

## 2022-02-08 DIAGNOSIS — N18.30 STAGE 3 CHRONIC KIDNEY DISEASE, UNSPECIFIED WHETHER STAGE 3A OR 3B CKD (HCC): ICD-10-CM

## 2022-02-08 DIAGNOSIS — E11.21 TYPE 2 DIABETES MELLITUS WITH DIABETIC NEPHROPATHY, WITHOUT LONG-TERM CURRENT USE OF INSULIN (HCC): Primary | ICD-10-CM

## 2022-02-08 DIAGNOSIS — E11.21 TYPE 2 DIABETES MELLITUS WITH DIABETIC NEPHROPATHY, WITHOUT LONG-TERM CURRENT USE OF INSULIN (HCC): ICD-10-CM

## 2022-02-08 LAB
ALBUMIN SERPL-MCNC: 4.4 G/DL (ref 3.7–4.7)
ALBUMIN/CREAT UR: 45 MG/G CREAT (ref 0–29)
ALBUMIN/GLOB SERPL: 1.3 {RATIO} (ref 1.2–2.2)
ALP SERPL-CCNC: 73 IU/L (ref 44–121)
ALT SERPL-CCNC: 16 IU/L (ref 0–44)
AST SERPL-CCNC: 17 IU/L (ref 0–40)
BASOPHILS # BLD AUTO: 0 X10E3/UL (ref 0–0.2)
BASOPHILS NFR BLD AUTO: 1 %
BILIRUB SERPL-MCNC: 0.8 MG/DL (ref 0–1.2)
BUN SERPL-MCNC: 19 MG/DL (ref 8–27)
BUN/CREAT SERPL: 13 (ref 10–24)
CALCIUM SERPL-MCNC: 9.4 MG/DL (ref 8.6–10.2)
CHLORIDE SERPL-SCNC: 105 MMOL/L (ref 96–106)
CHOLEST SERPL-MCNC: 153 MG/DL (ref 100–199)
CO2 SERPL-SCNC: 24 MMOL/L (ref 20–29)
CREAT SERPL-MCNC: 1.48 MG/DL (ref 0.76–1.27)
CREAT UR-MCNC: 101.5 MG/DL
EOSINOPHIL # BLD AUTO: 0.1 X10E3/UL (ref 0–0.4)
EOSINOPHIL NFR BLD AUTO: 2 %
ERYTHROCYTE [DISTWIDTH] IN BLOOD BY AUTOMATED COUNT: 14.2 % (ref 11.6–15.4)
EST. AVERAGE GLUCOSE BLD GHB EST-MCNC: 189 MG/DL
GLOBULIN SER CALC-MCNC: 3.5 G/DL (ref 1.5–4.5)
GLUCOSE SERPL-MCNC: 140 MG/DL (ref 65–99)
HBA1C MFR BLD: 8.2 % (ref 4.8–5.6)
HCT VFR BLD AUTO: 44.1 % (ref 37.5–51)
HCV AB S/CO SERPL IA: <0.1 S/CO RATIO (ref 0–0.9)
HDLC SERPL-MCNC: 51 MG/DL
HGB BLD-MCNC: 14.1 G/DL (ref 13–17.7)
IMM GRANULOCYTES # BLD AUTO: 0 X10E3/UL (ref 0–0.1)
IMM GRANULOCYTES NFR BLD AUTO: 0 %
LDLC SERPL CALC-MCNC: 81 MG/DL (ref 0–99)
LYMPHOCYTES # BLD AUTO: 2.2 X10E3/UL (ref 0.7–3.1)
LYMPHOCYTES NFR BLD AUTO: 28 %
MCH RBC QN AUTO: 28.2 PG (ref 26.6–33)
MCHC RBC AUTO-ENTMCNC: 32 G/DL (ref 31.5–35.7)
MCV RBC AUTO: 88 FL (ref 79–97)
MICROALBUMIN UR-MCNC: 45.8 UG/ML
MONOCYTES # BLD AUTO: 0.7 X10E3/UL (ref 0.1–0.9)
MONOCYTES NFR BLD AUTO: 9 %
NEUTROPHILS # BLD AUTO: 4.9 X10E3/UL (ref 1.4–7)
NEUTROPHILS NFR BLD AUTO: 60 %
PLATELET # BLD AUTO: 221 X10E3/UL (ref 150–450)
POTASSIUM SERPL-SCNC: 3.9 MMOL/L (ref 3.5–5.2)
PROT SERPL-MCNC: 7.9 G/DL (ref 6–8.5)
RBC # BLD AUTO: 5 X10E6/UL (ref 4.14–5.8)
SODIUM SERPL-SCNC: 142 MMOL/L (ref 134–144)
TRIGL SERPL-MCNC: 119 MG/DL (ref 0–149)
VLDLC SERPL CALC-MCNC: 21 MG/DL (ref 5–40)
WBC # BLD AUTO: 8 X10E3/UL (ref 3.4–10.8)

## 2022-02-08 PROCEDURE — 99214 OFFICE O/P EST MOD 30 MIN: CPT | Performed by: INTERNAL MEDICINE

## 2022-02-08 PROCEDURE — G8536 NO DOC ELDER MAL SCRN: HCPCS | Performed by: INTERNAL MEDICINE

## 2022-02-08 PROCEDURE — G8754 DIAS BP LESS 90: HCPCS | Performed by: INTERNAL MEDICINE

## 2022-02-08 PROCEDURE — G8753 SYS BP > OR = 140: HCPCS | Performed by: INTERNAL MEDICINE

## 2022-02-08 PROCEDURE — G8417 CALC BMI ABV UP PARAM F/U: HCPCS | Performed by: INTERNAL MEDICINE

## 2022-02-08 PROCEDURE — G8432 DEP SCR NOT DOC, RNG: HCPCS | Performed by: INTERNAL MEDICINE

## 2022-02-08 PROCEDURE — 1101F PT FALLS ASSESS-DOCD LE1/YR: CPT | Performed by: INTERNAL MEDICINE

## 2022-02-08 PROCEDURE — G8427 DOCREV CUR MEDS BY ELIG CLIN: HCPCS | Performed by: INTERNAL MEDICINE

## 2022-02-08 RX ORDER — BLOOD SUGAR DIAGNOSTIC
STRIP MISCELLANEOUS
Qty: 100 STRIP | Refills: 1 | Status: SHIPPED | OUTPATIENT
Start: 2022-02-08

## 2022-02-08 NOTE — PROGRESS NOTES
SUBJECTIVE:   Mr. Nick Rolon is a 64 y.o. male who is here for follow up of routine medical issues. Previously he saw Dr. Adi Martinez. Chief Complaint   Patient presents with   [facility]     Patient stated he had lab work done yesterday. No results yet. He had COVID-19, contracted in October. He has fully recovered. He had a bout of shingles in February 2021, with no residual pain. He also had shingles in October--left arm. Gluc 90s-low 100s. He has been diabetic since about 2005. He no longer sees Dr. Woods. His BP at home is 129/83 this morning. Vertigo \"doing fine\": He has been to see Dr. Pam Shah. Now seeing Dr. Marsha Aguillon for retinal screening. Knee pain L > R. Continues to have knee pain in L knee--Previously he saw Dr. Kellee Ponce. As noted 2018: \"he gave me a cortisone shot and said it was bone-on-bone there. \"    Also stiffness in AM for a little while, a few minutes (less than 15)--back, hips. \"Gets better once I get moving around. \"   At this time, he is otherwise doing well and has brought no other complaints to my attention today. For a list of the medical issues addressed today, see the assessment and plan below. PMH:   Past Medical History:   Diagnosis Date    Arthritis     knees, feet, hands    Diabetes mellitus [address] [address]     Does use hearing aid     Hearing loss     Has seen Dr. Naila Young Hypertension     Obesity     Vertigo     BPPV; Has seen ENT Dr. Ailyn Samuels, then Dr. Pam Shah       Past Surgical History:   Procedure Laterality Date    COLONOSCOPY N/A 7/8/2019    [facility]  7/8/2019         HX CATARACT REMOVAL Bilateral ~ 2009    Bilateral    HX ROTATOR CUFF REPAIR Left 12/13/2013    shoulder    HX TONSILLECTOMY  1948    as a child    [place] COLONOSCOPY FLX DX W/COLLJ SPEC WHEN PFRMD  5/15/2014    Dr. Lindsey Wills       All: He is allergic to ace inhibitors.    Current Outpatient Medications   Medication Sig    Ozempic 1 mg/dose (4 mg/3 mL) melissaij INJECT 1 MG UNDER THE SKIN EVERY 7 DAYS    metFORMIN (GLUCOPHAGE) 1,000 mg tablet TAKE 1 TABLET TWICE A DAY WITH MEALS    empagliflozin (Jardiance) 25 mg tablet Take 1 Tablet by mouth daily.  OneTouch Verio test strips strip TEST BLOOD SUGAR EVERY DAY MUST ESTABLISH WITH NEW ENDOCRINOLOGIST OR CONTACT PCP FOR FURTHER REFILLS    glipiZIDE (GLUCOTROL) 5 mg tablet TAKE 1 TABLET TWICE A DAY    losartan (COZAAR) 50 mg tablet TAKE 1 TABLET TWICE A DAY    amLODIPine (NORVASC) 10 mg tablet Take 1 Tab by mouth daily.  diclofenac (VOLTAREN) 1 % gel Apply 2 g to affected area four (4) times daily. (Patient taking differently: Apply 2 g to affected area four (4) times daily as needed for Pain.)    sildenafil citrate (VIAGRA) 50 mg tablet Take 1 Tab by mouth as needed.  BD ULTRA-FINE MINI PEN NEEDLE 31 gauge x 3/16\" ndle USE WITH INSULIN INJECTIONS DAILY    miscellaneous medical supply misc Order for diabetic shoes and inserts. No current facility-administered medications for this visit. FH:  Mother had lung cancer, also had HTN. Father of  of prostate cancer age 39. His sister has had breast cancer. His family history includes Heart Disease in his paternal grandmother; Hypertension in his mother; and brain aneurysm in his maternal grandfather. His maternal grandmother  of complications of childbirth. SH: He is retired . [employer] . . He reports that he quit smoking about 52 years ago. He has a 5.00 pack-year smoking history. He has never used smokeless tobacco. He reports that he does not drink alcohol and does not use drugs. ROS: See above; Complete ROS otherwise negative.      OBJECTIVE:   Vitals:   Visit Vitals  BP (!) 156/82 (BP 1 Location: Left arm, BP Patient Position: Sitting, BP Cuff Size: Large adult)   Pulse 95   Temp 97 °F (36.1 °C) (Temporal)   Resp 16   Ht 5' 7\" (1.702 m)   Wt 256 lb 6.4 oz (116.3 kg)   SpO2 97%   BMI 40.16 kg/m²      Gen: Pleasant, obese 64 y.o. AA male in NAD. HEENT: PERRLA. EOMI. OP moist and pink.  Neck: Supple.  No LAD.  HEART: RRR, No M/G/R.   LUNGS: CTAB No W/R.   ABDOMEN: S, NT, ND, BS+.   EXTREMITIES: Warm. No C/C/E. MUSCULOSKELETAL: Normal ROM, muscle strength 5/5 all groups. NEURO: Alert and oriented x 3.  Cranial nerves grossly intact.  No focal sensory or motor deficits noted. SKIN: Warm. Dry. No rashes or other lesions noted. Lab Results   Component Value Date/Time    Sodium 145 (H) 06/15/2021 10:49 AM    Potassium 4.3 06/15/2021 10:49 AM    Chloride 108 (H) 06/15/2021 10:49 AM    CO2 25 06/15/2021 10:49 AM    Glucose 123 (H) 06/15/2021 10:49 AM    BUN 17 06/15/2021 10:49 AM    Creatinine 1.44 (H) 06/15/2021 10:49 AM    BUN/Creatinine ratio 12 06/15/2021 10:49 AM    GFR est AA 54 (L) 06/15/2021 10:49 AM    GFR est non-AA 47 (L) 06/15/2021 10:49 AM    Calcium 9.0 06/15/2021 10:49 AM    Bilirubin, total 1.0 06/15/2021 10:49 AM    ALT (SGPT) 15 06/15/2021 10:49 AM    Alk. phosphatase 71 06/15/2021 10:49 AM    Protein, total 7.8 06/15/2021 10:49 AM    Albumin 4.1 06/15/2021 10:49 AM    A-G Ratio 1.1 (L) 06/15/2021 10:49 AM       Lab Results   Component Value Date/Time    Cholesterol, total 160 06/15/2021 10:49 AM    HDL Cholesterol 53 06/15/2021 10:49 AM    LDL, calculated 90 06/15/2021 10:49 AM    LDL, calculated 73 05/29/2020 10:03 AM    Triglyceride 92 06/15/2021 10:49 AM        Lab Results   Component Value Date/Time    Hemoglobin A1c 8.2 (H) 06/15/2021 10:49 AM       Lab Results   Component Value Date/Time    WBC 6.6 06/15/2021 10:49 AM    HGB 14.0 06/15/2021 10:49 AM    HCT 43.7 06/15/2021 10:49 AM    PLATELET 033 00/08/8696 10:49 AM    MCV 90 06/15/2021 10:49 AM         ASSESSMENT/ PLAN:     1. HTN (hypertension): Typically normal at home. Likely has \"white coat syndrome\". 2. Osteoarthritis: L knee pain. Inhibits ambulation at times--he has a handicap placard.  Continue follow up with orthopedics. May take more tylenol (up to limit of 4000 mg / day)  3. Mild renal insufficiency: May be developing some CKD due to DM +/- HTN. For now, work on DM control and continue ARB. 4. Diabetes mellitus: Not controlled. Continue the current Tx. Work on diet and exercise. 5. Obesity: Diet and exercise. Follow-up and Dispositions    · Return in about 6 months (around 8/8/2022) for HTN.

## 2022-02-08 NOTE — PROGRESS NOTES
Identified pt with two pt identifiers(name and ). Reviewed record in preparation for visit and have obtained necessary documentation. Chief Complaint   Patient presents with   [facility]     Patient stated he had lab work done yesterday. No results yet. Vitals:    22 1142   BP: (!) 156/82   Pulse: 95   Resp: 16   Temp: 97 °F (36.1 °C)   TempSrc: Temporal   SpO2: 97%   Weight: 256 lb 6.4 oz (116.3 kg)   Height: 5' 7\" (1.702 m)   PainSc:   0 - No pain       Health Maintenance Due   Topic    Hepatitis C Screening     Shingrix Vaccine Age 50> (1 of 2)    MICROALBUMIN Q1     COVID-19 Vaccine (3 - Booster for Moderna series)    Flu Vaccine (1)    Foot Exam Q1     A1C test (Diabetic or Prediabetic)        Coordination of Care Questionnaire:  :   1) Have you been to an emergency room, urgent care, or hospitalized since your last visit? If yes, where when, and reason for visit? no       2. Have seen or consulted any other health care provider since your last visit? If yes, where when, and reason for visit? NO      Patient is accompanied by self I have received verbal consent from Hubert Metzger to discuss any/all medical information while they are present in the room. An electronic signature was used to authenticate this note.   -- Sha Arizmendi LPN

## 2022-03-19 PROBLEM — E66.01 OBESITY, MORBID (HCC): Status: ACTIVE | Noted: 2018-05-16

## 2022-03-19 PROBLEM — E11.21 TYPE 2 DIABETES WITH NEPHROPATHY (HCC): Status: ACTIVE | Noted: 2018-05-16

## 2022-04-12 RX ORDER — GLIPIZIDE 5 MG/1
TABLET ORAL
Qty: 180 TABLET | Refills: 3 | Status: SHIPPED | OUTPATIENT
Start: 2022-04-12

## 2022-04-12 RX ORDER — LOSARTAN POTASSIUM 50 MG/1
TABLET ORAL
Qty: 180 TABLET | Refills: 3 | Status: SHIPPED | OUTPATIENT
Start: 2022-04-12

## 2022-04-12 RX ORDER — AMLODIPINE BESYLATE 10 MG/1
TABLET ORAL
Qty: 90 TABLET | Refills: 3 | Status: SHIPPED | OUTPATIENT
Start: 2022-04-12

## 2022-08-02 ENCOUNTER — OFFICE VISIT (OUTPATIENT)
Dept: INTERNAL MEDICINE CLINIC | Age: 77
End: 2022-08-02
Payer: MEDICARE

## 2022-08-02 VITALS
BODY MASS INDEX: 39.3 KG/M2 | TEMPERATURE: 97.3 F | SYSTOLIC BLOOD PRESSURE: 112 MMHG | HEART RATE: 76 BPM | HEIGHT: 67 IN | OXYGEN SATURATION: 96 % | WEIGHT: 250.4 LBS | DIASTOLIC BLOOD PRESSURE: 64 MMHG | RESPIRATION RATE: 18 BRPM

## 2022-08-02 DIAGNOSIS — Z00.00 MEDICARE ANNUAL WELLNESS VISIT, SUBSEQUENT: Primary | ICD-10-CM

## 2022-08-02 DIAGNOSIS — N18.30 STAGE 3 CHRONIC KIDNEY DISEASE, UNSPECIFIED WHETHER STAGE 3A OR 3B CKD (HCC): ICD-10-CM

## 2022-08-02 DIAGNOSIS — E11.21 TYPE 2 DIABETES MELLITUS WITH DIABETIC NEPHROPATHY, WITHOUT LONG-TERM CURRENT USE OF INSULIN (HCC): ICD-10-CM

## 2022-08-02 PROCEDURE — G8754 DIAS BP LESS 90: HCPCS | Performed by: INTERNAL MEDICINE

## 2022-08-02 PROCEDURE — G8427 DOCREV CUR MEDS BY ELIG CLIN: HCPCS | Performed by: INTERNAL MEDICINE

## 2022-08-02 PROCEDURE — G8536 NO DOC ELDER MAL SCRN: HCPCS | Performed by: INTERNAL MEDICINE

## 2022-08-02 PROCEDURE — G8510 SCR DEP NEG, NO PLAN REQD: HCPCS | Performed by: INTERNAL MEDICINE

## 2022-08-02 PROCEDURE — G8417 CALC BMI ABV UP PARAM F/U: HCPCS | Performed by: INTERNAL MEDICINE

## 2022-08-02 PROCEDURE — G0439 PPPS, SUBSEQ VISIT: HCPCS | Performed by: INTERNAL MEDICINE

## 2022-08-02 PROCEDURE — G8752 SYS BP LESS 140: HCPCS | Performed by: INTERNAL MEDICINE

## 2022-08-02 PROCEDURE — 1101F PT FALLS ASSESS-DOCD LE1/YR: CPT | Performed by: INTERNAL MEDICINE

## 2022-08-02 NOTE — PROGRESS NOTES
This is the Subsequent Medicare Annual Wellness Exam, performed 12 months or more after the Initial AWV or the last Subsequent AWV    I have reviewed the patient's medical history in detail and updated the computerized patient record.      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 Bobby Little    Herpes zoster without complication 2763    Initially in February, recurrence in L arm in October    Hypertension     Obesity     Vertigo     BPPV; Has seen ENT Dr. Bobby Little, then Dr. Fozia Ca      Past Surgical History:   Procedure Laterality Date    COLONOSCOPY N/A 7/8/2019    COLONOSCOPY performed by Aristides Rudd MD at [facility] ENDOSCOPY    COLONOSCOPY,DIAGNOSTIC  7/8/2019         HX CATARACT REMOVAL Bilateral ~ 2009    Bilateral    HX ROTATOR CUFF REPAIR Left 12/13/2013    shoulder    HX TONSILLECTOMY  1948    as a child    IN COLONOSCOPY FLX DX W/COLLJ SPEC WHEN PFRMD  5/15/2014    Dr. Marquez Otero     Current Outpatient Medications   Medication Sig Dispense Refill    glipiZIDE (GLUCOTROL) 5 mg tablet TAKE 1 TABLET TWICE A  Tablet 3    amLODIPine (NORVASC) 10 mg tablet TAKE 1 TABLET DAILY 90 Tablet 3    losartan (COZAAR) 50 mg tablet TAKE 1 TABLET TWICE A  Tablet 3    OneTouch Verio test strips strip TEST BLOOD SUGAR EVERY DAY MUST ESTABLISH WITH NEW ENDOCRINOLOGIST OR CONTACT PCP FOR FURTHER REFILLS 100 Strip 1    Ozempic 1 mg/dose (4 mg/3 mL) pnij INJECT 1 MG UNDER THE SKIN EVERY 7 DAYS 3 Each 3    metFORMIN (GLUCOPHAGE) 1,000 mg tablet TAKE 1 TABLET TWICE A DAY WITH MEALS 180 Tablet 3    empagliflozin (Jardiance) 25 mg tablet Take 1 Tablet by mouth daily. 90 Tablet 3    diclofenac (VOLTAREN) 1 % gel Apply 2 g to affected area four (4) times daily. (Patient taking differently: Apply 2 g to affected area four (4) times daily as needed for Pain.) 100 g 3    sildenafil citrate (VIAGRA) 50 mg tablet Take 1 Tab by mouth as needed. 30 Tab 10    BD ULTRA-FINE MINI PEN NEEDLE 31 gauge x 3/16\" ndle USE WITH INSULIN INJECTIONS DAILY 100 Pen Needle 3    miscellaneous medical supply misc Order for diabetic shoes and inserts. 1 Each 0     Allergies   Allergen Reactions    Ace Inhibitors Cough       Family History   Problem Relation Age of Onset    Hypertension Mother     Cancer Mother         lung    Cancer Father 39        prostate    Other Maternal Grandmother          as result of complications of childbirth when baby was 8 mos old    Cancer Sister         half sister-breast CA in remission    Other Maternal Grandfather         aneurysm    Heart Disease Paternal Grandmother      Social History     Tobacco Use    Smoking status: Former     Packs/day: 1.00     Years: 5.00     Pack years: 5.00     Types: Cigarettes     Quit date: 1970     Years since quittin.6    Smokeless tobacco: Never   Substance Use Topics    Alcohol use: No       Depression Risk Factor Screening:     3 most recent PHQ Screens 2022   Little interest or pleasure in doing things Not at all   Feeling down, depressed, irritable, or hopeless Not at all   Total Score PHQ 2 0       Alcohol Risk Factor Screening (MALE > 65): Do you average more 1 drink per night or more than 7 drinks a week: No    In the past three months have you have had more than 4 drinks containing alcohol on one occasion: No      Functional Ability and Level of Safety:   Hearing: The patient wears hearing aids. Activities of Daily Living: The home contains: no safety equipment.   Patient does total self care    Ambulation: with no difficulty    Fall Risk:  Fall Risk Assessment, last 12 mths 8/2/2022   Able to walk? Yes   Fall in past 12 months? 0   Do you feel unsteady? 0   Are you worried about falling 0       Abuse Screen:  Patient is not abused    Cognitive Screening   Has your family/caregiver stated any concerns about your memory: no  Cognitive Screening: Normal    Patient Care Team   Patient Care Team:  Nicol Ennis MD as PCP - General (Internal Medicine Physician)  Nicol Ennis MD as PCP - [facility]  Geoffrey Aguirre MD (Ophthalmology)  Jay Massey MD (Gastroenterology)  Paulina Zepeda MD as Consulting Provider (Endocrinology Physician)  Janett Thornton MD (Orthopedic Surgery)  Dr. Marina Jones (Urology)  Dr. Kayla Eason ([address] [place] [address])  Jacques Reynolds DPM (Podiatry)    Assessment/Plan   Education and counseling provided:  Are appropriate based on today's review and evaluation        Health Maintenance Due   Topic Date Due    COVID-19 Vaccine (4 - Booster for Shaun Landry series) 04/15/2022    Depression Screen  06/18/2022    Medicare Yearly Exam  06/19/2022    A1C test (Diabetic or Prediabetic)  08/07/2022       Discussed the patient's BMI with him. The BMI follow up plan is as follows:     dietary management education, guidance, and counseling  encourage exercise  monitor weight  prescribed dietary intake    An After Visit Summary was printed and given to the patient.

## 2022-08-02 NOTE — PROGRESS NOTES
1. \"Have you been to the ER, urgent care clinic since your last visit? Hospitalized since your last visit? \" No    2. \"Have you seen or consulted any other health care providers outside of the [address] [place] [address] since your last visit? \" No     3. For patients aged 39-70: Has the patient had a colonoscopy / FIT/ Cologuard?  NA - based on age

## 2022-08-02 NOTE — PATIENT INSTRUCTIONS
Medicare Wellness Visit, Male    The best way to live healthy is to have a lifestyle where you eat a well-balanced diet, exercise regularly, limit alcohol use, and quit all forms of tobacco/nicotine, if applicable. Regular preventive services are another way to keep healthy. Preventive services (vaccines, screening tests, monitoring & exams) can help personalize your care plan, which helps you manage your own care. Screening tests can find health problems at the earliest stages, when they are easiest to treat. [facility] follows the current, evidence-based guidelines published by the [facility] Ruel Jimmy ([facility]) when recommending preventive services for our patients. Because we follow these guidelines, sometimes recommendations change over time as research supports it. (For example, a prostate screening blood test is no longer routinely recommended for men with no symptoms). Of course, you and your doctor may decide to screen more often for some diseases, based on your risk and co-morbidities (chronic disease you are already diagnosed with). Preventive services for you include:  - Medicare offers their members a free annual wellness visit, which is time for you and your primary care provider to discuss and plan for your preventive service needs. Take advantage of this benefit every year!  -All adults over age 72 should receive the recommended pneumonia vaccines. Current USPSTF guidelines recommend a series of two vaccines for the best pneumonia protection.   -All adults should have a flu vaccine yearly and tetanus vaccine every 10 years.  -All adults age 48 and older should receive the shingles vaccines (series of two vaccines).        -All adults age 38-68 who are overweight should have a diabetes screening test once every three years.   -Other screening tests & preventive services for persons with diabetes include: an eye exam to screen for diabetic retinopathy, a kidney function test, a foot exam, and stricter control over your cholesterol.   -Cardiovascular screening for adults with routine risk involves an electrocardiogram (ECG) at intervals determined by the provider.   -Colorectal cancer screening should be done for adults age 54-65 with no increased risk factors for colorectal cancer. There are a number of acceptable methods of screening for this type of cancer. Each test has its own benefits and drawbacks. Discuss with your provider what is most appropriate for you during your annual wellness visit. The different tests include: colonoscopy (considered the best screening method), a fecal occult blood test, a fecal DNA test, and sigmoidoscopy.  -All adults born between [facility] should be screened once for Hepatitis C.  -An Abdominal Aortic Aneurysm (AAA) Screening is recommended for men age 73-68 who has ever smoked in their lifetime.      Here is a list of your current Health Maintenance items (your personalized list of preventive services) with a due date:  Health Maintenance Due   Topic Date Due    COVID-19 Vaccine (4 - Booster for Moderna series) 04/15/2022    Depresssion Screening  06/18/2022    Hemoglobin A1C    08/07/2022

## 2022-08-02 NOTE — PROGRESS NOTES
SUBJECTIVE:   Mr. Jennifer Mcpherson is a 76 y.o. male who is here for follow up of routine medical issues. Previously he saw Dr. Royal Mcarthur. Chief Complaint   Patient presents with    Follow-up     6 month fu    Labs     fasting       He has new hearing aids. Gluc 90s-low 100s. He has been diabetic since about 2005. He no longer sees Dr. Mica Gallagher. His BP at home has been normal.   Vertigo \"doing fine\": He has been to see Dr. Faustina Anaya. Now seeing Dr. William Tadeo for retinal screening. Knee pain L > R. Continues to have knee pain in L knee--Previously he saw Dr. Bertin Min. As noted 2018: \"he gave me a cortisone shot and said it was bone-on-bone there. \"    Also stiffness in AM for a little while, a few minutes (less than 15)--back, hips. \"Gets better once I get moving around. \"   At this time, he is otherwise doing well and has brought no other complaints to my attention today. For a list of the medical issues addressed today, see the assessment and plan below. PMH:   Past Medical History:   Diagnosis Date    Arthritis     knees, feet, hands    COVID-19 10/2021    Diabetes mellitus ([address]     Does use hearing aid     Hearing loss     Has seen Dr. Orestes Gary    Hypertension     Obesity     Vertigo     BPPV; Has seen ENT Dr. Orestes Gary, then Dr. Faustina Anaya       Past Surgical History:   Procedure Laterality Date    COLONOSCOPY N/A 7/8/2019    COLONOSCOPY performed by Doug Galarza MD at [facility] ENDOSCOPY    COLONOSCOPY,DIAGNOSTIC  7/8/2019         HX CATARACT REMOVAL Bilateral ~ 2009    Bilateral    HX ROTATOR CUFF REPAIR Left 12/13/2013    shoulder    HX TONSILLECTOMY  1948    as a child    [place] COLONOSCOPY FLX DX W/COLLJ SPEC WHEN PFRMD  5/15/2014    Dr. Subhash Royal       All: He is allergic to ace inhibitors.    Current Outpatient Medications   Medication Sig    glipiZIDE (GLUCOTROL) 5 mg tablet TAKE 1 TABLET TWICE A DAY    amLODIPine (NORVASC) 10 mg tablet TAKE 1 TABLET DAILY    losartan (COZAAR) 50 mg tablet TAKE 1 TABLET TWICE A DAY    OneTouch Verio test strips strip TEST BLOOD SUGAR EVERY DAY MUST ESTABLISH WITH NEW ENDOCRINOLOGIST OR CONTACT PCP FOR FURTHER REFILLS    Ozempic 1 mg/dose (4 mg/3 mL) pnij INJECT 1 MG UNDER THE SKIN EVERY 7 DAYS    metFORMIN (GLUCOPHAGE) 1,000 mg tablet TAKE 1 TABLET TWICE A DAY WITH MEALS    empagliflozin (Jardiance) 25 mg tablet Take 1 Tablet by mouth daily. diclofenac (VOLTAREN) 1 % gel Apply 2 g to affected area four (4) times daily. (Patient taking differently: Apply 2 g to affected area four (4) times daily as needed for Pain.)    sildenafil citrate (VIAGRA) 50 mg tablet Take 1 Tab by mouth as needed. BD ULTRA-FINE MINI PEN NEEDLE 31 gauge x 3/16\" ndle USE WITH INSULIN INJECTIONS DAILY    miscellaneous medical supply misc Order for diabetic shoes and inserts. No current facility-administered medications for this visit. FH:  Mother had lung cancer, also had HTN. Father of  of prostate cancer age 39. His sister has had breast cancer. His family history includes Heart Disease in his paternal grandmother; Hypertension in his mother; and brain aneurysm in his maternal grandfather. His maternal grandmother  of complications of childbirth. SH: He is retired . [employer] . . He reports that he quit smoking about 52 years ago. His smoking use included cigarettes. He has a 5.00 pack-year smoking history. He has never used smokeless tobacco. He reports that he does not drink alcohol and does not use drugs. ROS: See above; Complete ROS otherwise negative. OBJECTIVE:   Vitals:   Visit Vitals  /64 (BP 1 Location: Left upper arm, BP Patient Position: Sitting, BP Cuff Size: Adult)   Pulse 76   Temp 97.3 °F (36.3 °C) (Temporal)   Resp 18   Ht 5' 7\" (1.702 m)   Wt 250 lb 6.4 oz (113.6 kg)   SpO2 96%   BMI 39.22 kg/m²      Gen: Pleasant, obese 76 y.o. AA male in NAD. HEENT: PERRLA. EOMI. OP moist and pink. Neck: Supple. No LAD.   HEART: RRR, No M/G/R.   LUNGS: CTAB No W/R.   ABDOMEN: S, NT, ND, BS+. EXTREMITIES: Warm. No C/C/E. MUSCULOSKELETAL: Normal ROM, muscle strength 5/5 all groups. NEURO: Alert and oriented x 3. Cranial nerves grossly intact. No focal sensory or motor deficits noted. SKIN: Warm. Dry. No rashes or other lesions noted. Lab Results   Component Value Date/Time    Sodium 142 02/07/2022 09:22 AM    Potassium 3.9 02/07/2022 09:22 AM    Chloride 105 02/07/2022 09:22 AM    CO2 24 02/07/2022 09:22 AM    Glucose 140 (H) 02/07/2022 09:22 AM    BUN 19 02/07/2022 09:22 AM    Creatinine 1.48 (H) 02/07/2022 09:22 AM    BUN/Creatinine ratio 13 02/07/2022 09:22 AM    GFR est AA 52 (L) 02/07/2022 09:22 AM    GFR est non-AA 45 (L) 02/07/2022 09:22 AM    Calcium 9.4 02/07/2022 09:22 AM    Bilirubin, total 0.8 02/07/2022 09:22 AM    ALT (SGPT) 16 02/07/2022 09:22 AM    Alk. phosphatase 73 02/07/2022 09:22 AM    Protein, total 7.9 02/07/2022 09:22 AM    Albumin 4.4 02/07/2022 09:22 AM    A-G Ratio 1.3 02/07/2022 09:22 AM       Lab Results   Component Value Date/Time    Cholesterol, total 153 02/07/2022 09:22 AM    HDL Cholesterol 51 02/07/2022 09:22 AM    LDL, calculated 81 02/07/2022 09:22 AM    LDL, calculated 73 05/29/2020 10:03 AM    Triglyceride 119 02/07/2022 09:22 AM        Lab Results   Component Value Date/Time    Hemoglobin A1c 8.2 (H) 02/07/2022 09:22 AM       Lab Results   Component Value Date/Time    WBC 8.0 02/07/2022 09:22 AM    HGB 14.1 02/07/2022 09:22 AM    HCT 44.1 02/07/2022 09:22 AM    PLATELET 279 76/43/3249 09:22 AM    MCV 88 02/07/2022 09:22 AM         ASSESSMENT/ PLAN:     Diabetes mellitus: Not controlled. Continue the current Tx. Work on diet and exercise. HTN (hypertension): Typically normal at home. Likely has \"white coat syndrome\". Osteoarthritis: L knee pain. Inhibits ambulation at times--he has a handicap placard. Continue follow up with orthopedics.   May take more tylenol (up to limit of 4000 mg / day)  Mild renal insufficiency: May be developing some CKD due to DM +/- HTN. For now, work on DM control and continue ARB. Obesity: Diet and exercise.     RTC 4 months DM

## 2022-08-03 LAB
ALBUMIN SERPL-MCNC: 3.7 G/DL (ref 3.5–5)
ALBUMIN/GLOB SERPL: 0.9 {RATIO} (ref 1.1–2.2)
ALP SERPL-CCNC: 64 U/L (ref 45–117)
ALT SERPL-CCNC: 18 U/L (ref 12–78)
ANION GAP SERPL CALC-SCNC: 8 MMOL/L (ref 5–15)
AST SERPL-CCNC: 17 U/L (ref 15–37)
BASOPHILS # BLD: 0.1 K/UL (ref 0–0.1)
BASOPHILS NFR BLD: 1 % (ref 0–1)
BILIRUB SERPL-MCNC: 1.3 MG/DL (ref 0.2–1)
BUN SERPL-MCNC: 18 MG/DL (ref 6–20)
BUN/CREAT SERPL: 13 (ref 12–20)
CALCIUM SERPL-MCNC: 8.8 MG/DL (ref 8.5–10.1)
CHLORIDE SERPL-SCNC: 110 MMOL/L (ref 97–108)
CHOLEST SERPL-MCNC: 130 MG/DL
CO2 SERPL-SCNC: 26 MMOL/L (ref 21–32)
CREAT SERPL-MCNC: 1.38 MG/DL (ref 0.7–1.3)
DIFFERENTIAL METHOD BLD: ABNORMAL
EOSINOPHIL # BLD: 0.1 K/UL (ref 0–0.4)
EOSINOPHIL NFR BLD: 1 % (ref 0–7)
ERYTHROCYTE [DISTWIDTH] IN BLOOD BY AUTOMATED COUNT: 15.2 % (ref 11.5–14.5)
EST. AVERAGE GLUCOSE BLD GHB EST-MCNC: 171 MG/DL
GLOBULIN SER CALC-MCNC: 3.9 G/DL (ref 2–4)
GLUCOSE SERPL-MCNC: 65 MG/DL (ref 65–100)
HBA1C MFR BLD: 7.6 % (ref 4–5.6)
HCT VFR BLD AUTO: 41.6 % (ref 36.6–50.3)
HDLC SERPL-MCNC: 51 MG/DL
HDLC SERPL: 2.5 {RATIO} (ref 0–5)
HGB BLD-MCNC: 13.4 G/DL (ref 12.1–17)
IMM GRANULOCYTES # BLD AUTO: 0 K/UL (ref 0–0.04)
IMM GRANULOCYTES NFR BLD AUTO: 0 % (ref 0–0.5)
LDLC SERPL CALC-MCNC: 63.2 MG/DL (ref 0–100)
LYMPHOCYTES # BLD: 2.5 K/UL (ref 0.8–3.5)
LYMPHOCYTES NFR BLD: 30 % (ref 12–49)
MCH RBC QN AUTO: 29.4 PG (ref 26–34)
MCHC RBC AUTO-ENTMCNC: 32.2 G/DL (ref 30–36.5)
MCV RBC AUTO: 91.2 FL (ref 80–99)
MONOCYTES # BLD: 0.7 K/UL (ref 0–1)
MONOCYTES NFR BLD: 8 % (ref 5–13)
NEUTS SEG # BLD: 5 K/UL (ref 1.8–8)
NEUTS SEG NFR BLD: 60 % (ref 32–75)
NRBC # BLD: 0 K/UL (ref 0–0.01)
NRBC BLD-RTO: 0 PER 100 WBC
PLATELET # BLD AUTO: 215 K/UL (ref 150–400)
PMV BLD AUTO: 11.5 FL (ref 8.9–12.9)
POTASSIUM SERPL-SCNC: 3.4 MMOL/L (ref 3.5–5.1)
PROT SERPL-MCNC: 7.6 G/DL (ref 6.4–8.2)
RBC # BLD AUTO: 4.56 M/UL (ref 4.1–5.7)
SODIUM SERPL-SCNC: 144 MMOL/L (ref 136–145)
TRIGL SERPL-MCNC: 79 MG/DL (ref ?–150)
VLDLC SERPL CALC-MCNC: 15.8 MG/DL
WBC # BLD AUTO: 8.3 K/UL (ref 4.1–11.1)

## 2022-08-04 ENCOUNTER — DOCUMENTATION ONLY (OUTPATIENT)
Dept: INTERNAL MEDICINE CLINIC | Age: 77
End: 2022-08-04

## 2022-12-13 ENCOUNTER — OFFICE VISIT (OUTPATIENT)
Dept: INTERNAL MEDICINE CLINIC | Age: 77
End: 2022-12-13
Payer: MEDICARE

## 2022-12-13 VITALS
BODY MASS INDEX: 38.14 KG/M2 | DIASTOLIC BLOOD PRESSURE: 75 MMHG | SYSTOLIC BLOOD PRESSURE: 153 MMHG | OXYGEN SATURATION: 99 % | RESPIRATION RATE: 18 BRPM | HEART RATE: 84 BPM | HEIGHT: 67 IN | WEIGHT: 243 LBS | TEMPERATURE: 97.2 F

## 2022-12-13 DIAGNOSIS — I10 ESSENTIAL HYPERTENSION: ICD-10-CM

## 2022-12-13 DIAGNOSIS — N18.30 STAGE 3 CHRONIC KIDNEY DISEASE, UNSPECIFIED WHETHER STAGE 3A OR 3B CKD (HCC): ICD-10-CM

## 2022-12-13 DIAGNOSIS — E11.21 TYPE 2 DIABETES MELLITUS WITH DIABETIC NEPHROPATHY, WITHOUT LONG-TERM CURRENT USE OF INSULIN (HCC): Primary | ICD-10-CM

## 2022-12-13 DIAGNOSIS — N28.9 MILD RENAL INSUFFICIENCY: ICD-10-CM

## 2022-12-13 PROCEDURE — G8754 DIAS BP LESS 90: HCPCS | Performed by: INTERNAL MEDICINE

## 2022-12-13 PROCEDURE — G8753 SYS BP > OR = 140: HCPCS | Performed by: INTERNAL MEDICINE

## 2022-12-13 PROCEDURE — G8510 SCR DEP NEG, NO PLAN REQD: HCPCS | Performed by: INTERNAL MEDICINE

## 2022-12-13 PROCEDURE — G8427 DOCREV CUR MEDS BY ELIG CLIN: HCPCS | Performed by: INTERNAL MEDICINE

## 2022-12-13 PROCEDURE — 99214 OFFICE O/P EST MOD 30 MIN: CPT | Performed by: INTERNAL MEDICINE

## 2022-12-13 PROCEDURE — G8417 CALC BMI ABV UP PARAM F/U: HCPCS | Performed by: INTERNAL MEDICINE

## 2022-12-13 PROCEDURE — G8536 NO DOC ELDER MAL SCRN: HCPCS | Performed by: INTERNAL MEDICINE

## 2022-12-13 PROCEDURE — 1101F PT FALLS ASSESS-DOCD LE1/YR: CPT | Performed by: INTERNAL MEDICINE

## 2022-12-13 NOTE — PROGRESS NOTES
SUBJECTIVE:   Mr. Gokul Lazo is a 76 y.o. male who is here for follow up of routine medical issues. Previously he saw Dr. Catie Wong. Chief Complaint   Patient presents with    Follow-up     4 month fu       Stress: Wife losing vision. Gluc 90s-low 100s. This morning 170. He has been diabetic since about 2005. He no longer sees Dr. Toño Lima. His BP at home has been normal. 127/76 this morning. Vertigo \"doing fine\": He has been to see Dr. Joan Hector. Now seeing Dr. Shirin Ramirez for retinal screening. Knee pain L > R. Continues to have knee pain in L knee--Previously he saw Dr. Jesús Fermin. As noted 2018: \"he gave me a cortisone shot and said it was bone-on-bone there. \"    Also stiffness in AM for a little while, a few minutes (less than 15)--back, hips. \"Gets better once I get moving around. \"   At this time, he is otherwise doing well and has brought no other complaints to my attention today. For a list of the medical issues addressed today, see the assessment and plan below. PMH:   Past Medical History:   Diagnosis Date    Arthritis     knees, feet, hands    COVID-19 10/2021    Diabetes mellitus ([address]     Does use hearing aid     Hearing loss     Has seen Dr. Fazal Bellamy    Herpes zoster without complication 9556    Initially in February, recurrence in L arm in October    Hypertension     Obesity     Vertigo     BPPV; Has seen ENT Dr. Fazal Bellamy, then Dr. Joan Hector       Past Surgical History:   Procedure Laterality Date    COLONOSCOPY N/A 7/8/2019    COLONOSCOPY performed by Jalen Galarza MD at [facility] ENDOSCOPY    COLONOSCOPY,DIAGNOSTIC  7/8/2019         HX CATARACT REMOVAL Bilateral ~ 2009    Bilateral    HX ROTATOR CUFF REPAIR Left 12/13/2013    shoulder    HX TONSILLECTOMY  1948    as a child    [place] COLONOSCOPY FLX DX W/COLLJ SPEC WHEN PFRMD  5/15/2014    Dr. Lauren Mckeon       All: He is allergic to ace inhibitors.    Current Outpatient Medications   Medication Sig    glipiZIDE (GLUCOTROL) 5 mg tablet TAKE 1 TABLET TWICE A DAY amLODIPine (NORVASC) 10 mg tablet TAKE 1 TABLET DAILY    losartan (COZAAR) 50 mg tablet TAKE 1 TABLET TWICE A DAY    Ozempic 1 mg/dose (4 mg/3 mL) pnij INJECT 1 MG UNDER THE SKIN EVERY 7 DAYS    metFORMIN (GLUCOPHAGE) 1,000 mg tablet TAKE 1 TABLET TWICE A DAY WITH MEALS    empagliflozin (Jardiance) 25 mg tablet Take 1 Tablet by mouth daily. sildenafil citrate (VIAGRA) 50 mg tablet Take 1 Tab by mouth as needed. BD ULTRA-FINE MINI PEN NEEDLE 31 gauge x 3/16\" ndle USE WITH INSULIN INJECTIONS DAILY    miscellaneous medical supply misc Order for diabetic shoes and inserts. OneTouch Verio test strips strip TEST BLOOD SUGAR EVERY DAY MUST ESTABLISH WITH NEW ENDOCRINOLOGIST OR CONTACT PCP FOR FURTHER REFILLS    diclofenac (VOLTAREN) 1 % gel Apply 2 g to affected area four (4) times daily. (Patient not taking: Reported on 2022)     No current facility-administered medications for this visit. FH:  Mother had lung cancer, also had HTN. Father of  of prostate cancer age 39. His sister has had breast cancer. His family history includes Heart Disease in his paternal grandmother; Hypertension in his mother; and brain aneurysm in his maternal grandfather. His maternal grandmother  of complications of childbirth. SH: He is retired . [employer] . . He reports that he quit smoking about 52 years ago. His smoking use included cigarettes. He has a 5.00 pack-year smoking history. He has never used smokeless tobacco. He reports that he does not drink alcohol and does not use drugs. ROS: See above; Complete ROS otherwise negative. OBJECTIVE:   Vitals:   Visit Vitals  BP (!) 153/75 (BP 1 Location: Left upper arm, BP Patient Position: Sitting, BP Cuff Size: Adult)   Pulse 84   Temp 97.2 °F (36.2 °C) (Temporal)   Resp 18   Ht 5' 7\" (1.702 m)   Wt 243 lb (110.2 kg)   SpO2 99%   BMI 38.06 kg/m²      Gen: Pleasant, obese 76 y.o. AA male in [facility]. HEENT: PERRLA. EOMI.  OP moist and pink.  Neck: Supple. No LAD. HEART: RRR, No M/G/R.   LUNGS: CTAB No W/R. ABDOMEN: S, NT, ND, BS+. EXTREMITIES: Warm. No C/C/E. MUSCULOSKELETAL: Normal ROM, muscle strength 5/5 all groups. NEURO: Alert and oriented x 3. Cranial nerves grossly intact. No focal sensory or motor deficits noted. SKIN: Warm. Dry. No rashes or other lesions noted. Lab Results   Component Value Date/Time    Sodium 144 08/02/2022 12:48 PM    Potassium 3.4 (L) 08/02/2022 12:48 PM    Chloride 110 (H) 08/02/2022 12:48 PM    CO2 26 08/02/2022 12:48 PM    Anion gap 8 08/02/2022 12:48 PM    Glucose 65 08/02/2022 12:48 PM    BUN 18 08/02/2022 12:48 PM    Creatinine 1.38 (H) 08/02/2022 12:48 PM    BUN/Creatinine ratio 13 08/02/2022 12:48 PM    GFR est AA >60 08/02/2022 12:48 PM    GFR est non-AA 50 (L) 08/02/2022 12:48 PM    Calcium 8.8 08/02/2022 12:48 PM    Bilirubin, total 1.3 (H) 08/02/2022 12:48 PM    ALT (SGPT) 18 08/02/2022 12:48 PM    Alk. phosphatase 64 08/02/2022 12:48 PM    Protein, total 7.6 08/02/2022 12:48 PM    Albumin 3.7 08/02/2022 12:48 PM    Globulin 3.9 08/02/2022 12:48 PM    A-G Ratio 0.9 (L) 08/02/2022 12:48 PM       Lab Results   Component Value Date/Time    Cholesterol, total 130 08/02/2022 12:48 PM    HDL Cholesterol 51 08/02/2022 12:48 PM    LDL, calculated 63.2 08/02/2022 12:48 PM    Triglyceride 79 08/02/2022 12:48 PM    CHOL/HDL Ratio 2.5 08/02/2022 12:48 PM        Lab Results   Component Value Date/Time    Hemoglobin A1c 7.6 (H) 08/02/2022 12:48 PM       Lab Results   Component Value Date/Time    WBC 8.3 08/02/2022 12:48 PM    HGB 13.4 08/02/2022 12:48 PM    HCT 41.6 08/02/2022 12:48 PM    PLATELET 379 00/63/9173 12:48 PM    MCV 91.2 08/02/2022 12:48 PM         ASSESSMENT/ PLAN:     Diabetes mellitus: Not controlled. Continue the current Tx. Work on diet and exercise. CKD III: Work on glycemic control. On ARB. On GLP-2. HTN (hypertension): Typically normal at home. Likely has \"white coat syndrome\". Osteoarthritis: L knee pain. Inhibits ambulation at times--he has a handicap placard. Continue follow up with orthopedics. May take more tylenol (up to limit of 4000 mg / day)  Obesity: Diet and exercise.     Follow-up and Dispositions    Return in about 4 months (around 4/13/2023) for DM, HTN, etc.     RTC 4 months DM

## 2022-12-13 NOTE — PROGRESS NOTES
1. \"Have you been to the ER, urgent care clinic since your last visit? Hospitalized since your last visit? \" No    2. \"Have you seen or consulted any other health care providers outside of the [address] [place] [address] since your last visit? \" No     3. For patients aged 39-70: Has the patient had a colonoscopy / FIT/ Cologuard?  Yes - no Care Gap present

## 2022-12-14 LAB
ALBUMIN SERPL-MCNC: 3.8 G/DL (ref 3.5–5)
ALBUMIN/GLOB SERPL: 0.9 {RATIO} (ref 1.1–2.2)
ALP SERPL-CCNC: 123 U/L (ref 45–117)
ALT SERPL-CCNC: 42 U/L (ref 12–78)
ANION GAP SERPL CALC-SCNC: 3 MMOL/L (ref 5–15)
AST SERPL-CCNC: 23 U/L (ref 15–37)
BASOPHILS # BLD: 0.1 K/UL (ref 0–0.1)
BASOPHILS NFR BLD: 1 % (ref 0–1)
BILIRUB SERPL-MCNC: 0.9 MG/DL (ref 0.2–1)
BUN SERPL-MCNC: 16 MG/DL (ref 6–20)
BUN/CREAT SERPL: 11 (ref 12–20)
CALCIUM SERPL-MCNC: 8.8 MG/DL (ref 8.5–10.1)
CHLORIDE SERPL-SCNC: 109 MMOL/L (ref 97–108)
CHOLEST SERPL-MCNC: 160 MG/DL
CO2 SERPL-SCNC: 29 MMOL/L (ref 21–32)
CREAT SERPL-MCNC: 1.43 MG/DL (ref 0.7–1.3)
DIFFERENTIAL METHOD BLD: ABNORMAL
EOSINOPHIL # BLD: 0.1 K/UL (ref 0–0.4)
EOSINOPHIL NFR BLD: 1 % (ref 0–7)
ERYTHROCYTE [DISTWIDTH] IN BLOOD BY AUTOMATED COUNT: 15.6 % (ref 11.5–14.5)
EST. AVERAGE GLUCOSE BLD GHB EST-MCNC: 240 MG/DL
GLOBULIN SER CALC-MCNC: 4.3 G/DL (ref 2–4)
GLUCOSE SERPL-MCNC: 119 MG/DL (ref 65–100)
HBA1C MFR BLD: 10 % (ref 4–5.6)
HCT VFR BLD AUTO: 42.7 % (ref 36.6–50.3)
HDLC SERPL-MCNC: 57 MG/DL
HDLC SERPL: 2.8 {RATIO} (ref 0–5)
HGB BLD-MCNC: 13.3 G/DL (ref 12.1–17)
IMM GRANULOCYTES # BLD AUTO: 0 K/UL (ref 0–0.04)
IMM GRANULOCYTES NFR BLD AUTO: 0 % (ref 0–0.5)
LDLC SERPL CALC-MCNC: 84.6 MG/DL (ref 0–100)
LYMPHOCYTES # BLD: 2.4 K/UL (ref 0.8–3.5)
LYMPHOCYTES NFR BLD: 24 % (ref 12–49)
MCH RBC QN AUTO: 28.4 PG (ref 26–34)
MCHC RBC AUTO-ENTMCNC: 31.1 G/DL (ref 30–36.5)
MCV RBC AUTO: 91.2 FL (ref 80–99)
MONOCYTES # BLD: 0.8 K/UL (ref 0–1)
MONOCYTES NFR BLD: 8 % (ref 5–13)
NEUTS SEG # BLD: 6.8 K/UL (ref 1.8–8)
NEUTS SEG NFR BLD: 66 % (ref 32–75)
NRBC # BLD: 0 K/UL (ref 0–0.01)
NRBC BLD-RTO: 0 PER 100 WBC
PLATELET # BLD AUTO: 226 K/UL (ref 150–400)
PMV BLD AUTO: 11.4 FL (ref 8.9–12.9)
POTASSIUM SERPL-SCNC: 4 MMOL/L (ref 3.5–5.1)
PROT SERPL-MCNC: 8.1 G/DL (ref 6.4–8.2)
RBC # BLD AUTO: 4.68 M/UL (ref 4.1–5.7)
SODIUM SERPL-SCNC: 141 MMOL/L (ref 136–145)
TRIGL SERPL-MCNC: 92 MG/DL (ref ?–150)
VLDLC SERPL CALC-MCNC: 18.4 MG/DL
WBC # BLD AUTO: 10.1 K/UL (ref 4.1–11.1)

## 2022-12-29 NOTE — PROGRESS NOTES
The diabetes is poorly controlled suddenly. Let's double check he is taking his medicines. We might see if he can work with Marty Forte also.  BJF

## 2023-01-06 NOTE — PROGRESS NOTES
Patient returned call     Patient notified of lab results and recommendations from Dr. Christopher Rosa appt with Sonia Spencer on 1/16/2023    Patient confirms Glipizide 5 mg twice daily, Ozempic 1 mg every 7 days, Metformin 1000 mg twice daily with meals and Jardiance 25  mg daily are what he has available    Patient states he does not need refills as he is not taking his medication as he should. Patient advised to take medications as prescribed by PCP and educated on possible outcomes of poorly controlled glucose levels.     Patient states understanding

## 2023-01-06 NOTE — PROGRESS NOTES
Appt with Heydi 1/16/2023     M for patient to call office, patient has viewed results on [facility]    See result notes

## 2023-01-13 NOTE — PROGRESS NOTES
Pharmacy Progress Note - Diabetes Management      Assessment / Plan:   Diabetes Management:  - Per ADA guidelines, Pt's A1c is not at goal of < 7% . BP closer to goal <130/80.   - Continue Ozempic 1 mg weekly  - Continue Jardiance 25 mg daily  - Continue metformin 1 gm BID and glipizide 5 mg BID   - Emphasize importance of taking his medication consistently. - Assisted patient with FRANKO HERNANDEZ [facility] 3 sensor application acquire current BG readings. - Megan CGM order submitted to [facility]. Verdict pending.   - Recommend for patient to avoid fruit juices      S/O: Mr. Franco Blas is a 76 y.o. male, referred by Dr. Andreia Damon MD, with a PMH of T2DM, HTN, arthritis, vertigo, was seen today for diabetes management. Patient's last A1c was 10% (Dec 2022), 7.6% (Aug 2022), 8.2% (Feb 2022, Jun 2021), 7.8% (May 2020), 9.6% (Jan 2020). Interim update:   Saw Dr Alexandria Barone last month for f/up. Reports feeling \"off\" prior to PCP. Has missed his medications. Has been busy caring for his wife's healthcare. Medication Adherence/Access:  - Brought in home medications including prescription/non-prescriptions:  yes  - Endorses barriers to affording/accessing medications : no  - Uses a pill box/other methods to organize medications: yes  - Endorses adherence to current regimen?: no  - Uses [facility] pharmacy ;  Rx insurance is: [facility] ; [employer]    Diabetes Management:  Diabetes History:  - Endorses dx 2005  - H/o seeing Dr Hung Wilder  - Family hx:   - Previous anti-hyperglycemic agents includes:  Invokana - formulary change to Jardiance     Current anti-hyperglycemic regimen includes:    - Glipizide 5 mg BID  - Metformin 1 gm BID   - Jardiance 25 mg daily  - Ozempic 1 mg weekly - Saturday     Endorses missing most of his medications prior to last PCP visit in December     - Losartan 50 mg BID   - SCr 1.43 (Dec 2022)  - LDL 81 ;  (Dec 2022)    Lab Results   Component Value Date/Time    Microalb/Creat ratio (ug/mg creat.) 45 (H) 02/07/2022 09:22 AM     ROS:  Today, Pt endorses:  - Symptoms of Hyperglycemia: excessive thirst and polyuria  - Symptoms of Hypoglycemia: none    Blood Glucose Monitoring (BGM) or CGM:  No meter/log today   Interested in CGM    Lifestyle modification(s):    Reports to drinking a lot of fruit juices    Reports eye exam next month     The 10-year ASCVD risk score (Ashkan NAVA, et al., 2019) is: 42.9%    Values used to calculate the score:      Age: 76 years      Sex: Male      Is Non- : Yes      Diabetic: Yes      Tobacco smoker: No      Systolic Blood Pressure: 476 mmHg      Is BP treated: Yes      HDL Cholesterol: 57 MG/DL      Total Cholesterol: 160 MG/DL    Vitals: Wt Readings from Last 3 Encounters:   01/16/23 243 lb (110.2 kg)   12/13/22 243 lb (110.2 kg)   08/02/22 250 lb 6.4 oz (113.6 kg)     BP Readings from Last 3 Encounters:   01/16/23 138/77   12/13/22 (!) 153/75   08/02/22 112/64     Pulse Readings from Last 3 Encounters:   01/16/23 88   12/13/22 84   08/02/22 76       Past Medical History:   Diagnosis Date    Arthritis     knees, feet, hands    COVID-19 10/2021    Diabetes mellitus ([address]     Does use hearing aid     Hearing loss     Has seen Dr. Kyrie Santillan    Herpes zoster without complication 2797    Initially in February, recurrence in L arm in October    Hypertension     Obesity     Vertigo     BPPV; Has seen ENT Dr. Kyrie Santillan, then Dr. Ritchie Herrera     Allergies   Allergen Reactions    Ace Inhibitors Cough       Current Outpatient Medications   Medication Sig    glipiZIDE (GLUCOTROL) 5 mg tablet TAKE 1 TABLET TWICE A DAY    amLODIPine (NORVASC) 10 mg tablet TAKE 1 TABLET DAILY    losartan (COZAAR) 50 mg tablet TAKE 1 TABLET TWICE A DAY    Ozempic 1 mg/dose (4 mg/3 mL) pnij INJECT 1 MG UNDER THE SKIN EVERY 7 DAYS    metFORMIN (GLUCOPHAGE) 1,000 mg tablet TAKE 1 TABLET TWICE A DAY WITH MEALS    empagliflozin (Jardiance) 25 mg tablet Take 1 Tablet by mouth daily.     sildenafil citrate (VIAGRA) 50 mg tablet Take 1 Tab by mouth as needed. BD ULTRA-FINE MINI PEN NEEDLE 31 gauge x 3/16\" ndle USE WITH INSULIN INJECTIONS DAILY    miscellaneous medical supply misc Order for diabetic shoes and inserts. No current facility-administered medications for this visit. Lab Results   Component Value Date/Time    Sodium 141 12/13/2022 10:13 AM    Potassium 4.0 12/13/2022 10:13 AM    Chloride 109 (H) 12/13/2022 10:13 AM    CO2 29 12/13/2022 10:13 AM    Anion gap 3 (L) 12/13/2022 10:13 AM    Glucose 119 (H) 12/13/2022 10:13 AM    BUN 16 12/13/2022 10:13 AM    Creatinine 1.43 (H) 12/13/2022 10:13 AM    BUN/Creatinine ratio 11 (L) 12/13/2022 10:13 AM    GFR est AA >60 08/02/2022 12:48 PM    GFR est non-AA 50 (L) 08/02/2022 12:48 PM    Calcium 8.8 12/13/2022 10:13 AM    Bilirubin, total 0.9 12/13/2022 10:13 AM    Alk.  phosphatase 123 (H) 12/13/2022 10:13 AM    Protein, total 8.1 12/13/2022 10:13 AM    Albumin 3.8 12/13/2022 10:13 AM    Globulin 4.3 (H) 12/13/2022 10:13 AM    A-G Ratio 0.9 (L) 12/13/2022 10:13 AM    ALT (SGPT) 42 12/13/2022 10:13 AM     Lab Results   Component Value Date/Time    Cholesterol, total 160 12/13/2022 10:13 AM    HDL Cholesterol 57 12/13/2022 10:13 AM    LDL, calculated 84.6 12/13/2022 10:13 AM    VLDL, calculated 18.4 12/13/2022 10:13 AM    Triglyceride 92 12/13/2022 10:13 AM    CHOL/HDL Ratio 2.8 12/13/2022 10:13 AM     Lab Results   Component Value Date/Time    WBC 10.1 12/13/2022 10:13 AM    HGB 13.3 12/13/2022 10:13 AM    HCT 42.7 12/13/2022 10:13 AM    PLATELET 956 07/63/1345 10:13 AM    MCV 91.2 12/13/2022 10:13 AM       Lab Results   Component Value Date/Time    Microalb/Creat ratio (ug/mg creat.) 45 (H) 02/07/2022 09:22 AM       Lab Results   Component Value Date/Time    Hemoglobin A1c 10.0 (H) 12/13/2022 10:13 AM    Hemoglobin A1c 7.6 (H) 08/02/2022 12:48 PM    Hemoglobin A1c 8.2 (H) 02/07/2022 09:22 AM     Hemoglobin A1c (POC)   Date Value Ref Range Status   09/11/2019 7.2 % Final        Estimated Creatinine Clearance: 49.6 mL/min (A) (by C-G formula based on SCr of 1.43 mg/dL (H)). Medication reconciliation was completed during the visit. Medications Discontinued During This Encounter   Medication Reason    BD ULTRA-FINE MINI PEN NEEDLE 31 gauge x 1/55\" ndle DUPLICATE ORDER    empagliflozin (Jardiance) 25 mg tablet REORDER     Orders Placed This Encounter    empagliflozin (Jardiance) 25 mg tablet     Sig: Take 1 Tablet by mouth daily. Dispense:  90 Tablet     Refill:  3       Patient verbalized understanding of the information presented and all of the patients questions were answered. AVS was handed to the patient. Patient advised to call the office with any additional questions or concerns. Notifications of recommendations will be sent to Dr. Jony Krishnamurthy MD for review. Patient will return to clinic in 2 week(s) for follow up.      Thank you for the consult,  Rubi Butt, PharmD, BCACP, [address] [place] [address] Only    Program: Medical Group  CPA in place: Yes  Recommendation Provided To: Patient/Caregiver: 10 via In person  Intervention Detail: Adherence Monitoring: 3, Device Training, Discontinued Rx: 2, reason: Duplicate Therapy and Therapy Complete, New Rx: 2, reason: Improve Adherence and Needs Additional Therapy, Refill(s) Provided, and Scheduled Appointment  Intervention Accepted By: Patient/Caregiver: 10  Gap Closed?: Yes  Time Spent (min): 60

## 2023-01-16 ENCOUNTER — OFFICE VISIT (OUTPATIENT)
Dept: INTERNAL MEDICINE CLINIC | Age: 78
End: 2023-01-16

## 2023-01-16 VITALS
OXYGEN SATURATION: 96 % | DIASTOLIC BLOOD PRESSURE: 77 MMHG | HEIGHT: 67 IN | HEART RATE: 88 BPM | SYSTOLIC BLOOD PRESSURE: 138 MMHG | WEIGHT: 243 LBS | BODY MASS INDEX: 38.14 KG/M2

## 2023-01-16 DIAGNOSIS — E11.21 TYPE 2 DIABETES MELLITUS WITH DIABETIC NEPHROPATHY, WITHOUT LONG-TERM CURRENT USE OF INSULIN (HCC): Primary | ICD-10-CM

## 2023-01-16 NOTE — PATIENT INSTRUCTIONS
Will order for your Megan 3 sensor  Continue Ozempic 1 mg weekly  Continue metformin 1000 mg twice daily  Continue glipizide 5 mg twice daily  Continue Jardiance 25 mg daily  Avoid fruit juices

## 2023-01-17 ENCOUNTER — TELEPHONE (OUTPATIENT)
Dept: INTERNAL MEDICINE CLINIC | Age: 78
End: 2023-01-17

## 2023-01-17 NOTE — TELEPHONE ENCOUNTER
Pharmacy Progress Note - Telephone Encounter    Mr. Darrin Schroeder 76 y.o. was contacted via an outbound telephone call regarding his CGM access  today. A voicemail was left for patient to return my call. - Received communication from [facility] regarding CGM order. DME requests for patient to contact them back at [phone] to finalize order.      Thank you,  Rubi Catalan, PharmD, BCACP, [facility] Only    Program: Medical Group  CPA in place: Yes  Recommendation Provided To: Patient/Caregiver: 1 via Telephone  Time Spent (min): 5

## 2023-01-28 NOTE — PROGRESS NOTES
Pharmacy Progress Note - Diabetes Management    Assessment / Plan:   Diabetes Management:  - Per ADA guidelines, Pt's A1c is not at goal of < 7%. - Based on current CGM, hypoglycemia frequency, stop glipizide  - Review s/sx of hypoglycemia and management steps. - Continue metformin 1 gm BID  - Continue Ozempic 1 mg weekly  - Continue glipizide 5 mg BID  - Continue with current lifestyle modification efforts.   - Submitted status update request to [facility] regarding patient's CGM access. S/O: Mr. Jeremiah Cai is a 76 y.o. male, referred by Dr. Yoni Muir MD, with a PMH of T2DM, HTN, arthritis, vertigo, was seen today for diabetes management. Patient's last A1c was 10% (Dec 2022), 7.6% (Aug 2022), 8.2% (Feb 2022, Jun 2021), 7.8% (May 2020), 9.6% (Jan 2020). Interim update:   Started Megan 3 CGM sensor at the last visit. Reports to reducing overall carb intake. Avoiding juices. Current anti-hyperglycemic regimen include(s):    - Metformin 1 gm BID  - Glipizide 5 mg BID  - Jardiance 25 mg daily  - Ozempic 1 mg weekly Saturday    - Losartan 50 mg BID   - SCr 1.43 (Dec 2022)  - LDL 81 ;  (Dec 2022)    ROS:  Today, Pt endorses:  - Symptoms of Hyperglycemia: none  - Symptoms of Hypoglycemia: none    Blood Glucose Monitoring (BGM) or CGM:  Megan 3 CGM  Has not heard back from [facility]   14 days:                      Nutrition/Lifestyle Modifications:  Reports to cutting back on juices and overall carb intake --- 45-50 grams/meal   Eye exam Feb 2023    The 10-year ASCVD risk score (Ashkan NAVA, et al., 2019) is: 42.9%     Vitals:   Wt Readings from Last 3 Encounters:   01/30/23 240 lb (108.9 kg)   01/16/23 243 lb (110.2 kg)   12/13/22 243 lb (110.2 kg)     BP Readings from Last 3 Encounters:   01/16/23 138/77   12/13/22 (!) 153/75   08/02/22 112/64     Pulse Readings from Last 3 Encounters:   01/30/23 85   01/16/23 88   12/13/22 84       Past Medical History:   Diagnosis Date    Arthritis     knees, feet, hands    COVID-19 10/2021    Diabetes mellitus ([address]     Does use hearing aid     Hearing loss     Has seen Dr. Soina Zapien    Herpes zoster without complication 2963    Initially in February, recurrence in L arm in October    Hypertension     Obesity     Vertigo     BPPV; Has seen ENT Dr. Sonia Zapien, then Dr. Yaakov Councilman     Allergies   Allergen Reactions    Ace Inhibitors Cough       Current Outpatient Medications   Medication Sig    empagliflozin (Jardiance) 25 mg tablet Take 1 Tablet by mouth daily. glipiZIDE (GLUCOTROL) 5 mg tablet TAKE 1 TABLET TWICE A DAY    amLODIPine (NORVASC) 10 mg tablet TAKE 1 TABLET DAILY    losartan (COZAAR) 50 mg tablet TAKE 1 TABLET TWICE A DAY    Ozempic 1 mg/dose (4 mg/3 mL) pnij INJECT 1 MG UNDER THE SKIN EVERY 7 DAYS    metFORMIN (GLUCOPHAGE) 1,000 mg tablet TAKE 1 TABLET TWICE A DAY WITH MEALS    sildenafil citrate (VIAGRA) 50 mg tablet Take 1 Tab by mouth as needed. miscellaneous medical supply misc Order for diabetic shoes and inserts. No current facility-administered medications for this visit. Lab Results   Component Value Date/Time    Sodium 141 12/13/2022 10:13 AM    Potassium 4.0 12/13/2022 10:13 AM    Chloride 109 (H) 12/13/2022 10:13 AM    CO2 29 12/13/2022 10:13 AM    Anion gap 3 (L) 12/13/2022 10:13 AM    Glucose 119 (H) 12/13/2022 10:13 AM    BUN 16 12/13/2022 10:13 AM    Creatinine 1.43 (H) 12/13/2022 10:13 AM    BUN/Creatinine ratio 11 (L) 12/13/2022 10:13 AM    GFR est AA >60 08/02/2022 12:48 PM    GFR est non-AA 50 (L) 08/02/2022 12:48 PM    Calcium 8.8 12/13/2022 10:13 AM    Bilirubin, total 0.9 12/13/2022 10:13 AM    Alk.  phosphatase 123 (H) 12/13/2022 10:13 AM    Protein, total 8.1 12/13/2022 10:13 AM    Albumin 3.8 12/13/2022 10:13 AM    Globulin 4.3 (H) 12/13/2022 10:13 AM    A-G Ratio 0.9 (L) 12/13/2022 10:13 AM    ALT (SGPT) 42 12/13/2022 10:13 AM       Lab Results   Component Value Date/Time    Cholesterol, total 160 12/13/2022 10:13 AM    HDL Cholesterol 57 12/13/2022 10:13 AM    LDL, calculated 84.6 12/13/2022 10:13 AM    VLDL, calculated 18.4 12/13/2022 10:13 AM    Triglyceride 92 12/13/2022 10:13 AM    CHOL/HDL Ratio 2.8 12/13/2022 10:13 AM       Lab Results   Component Value Date/Time    WBC 10.1 12/13/2022 10:13 AM    HGB 13.3 12/13/2022 10:13 AM    HCT 42.7 12/13/2022 10:13 AM    PLATELET 724 13/85/4307 10:13 AM    MCV 91.2 12/13/2022 10:13 AM       Lab Results   Component Value Date/Time    Microalb/Creat ratio (ug/mg creat.) 45 (H) 02/07/2022 09:22 AM       HbA1c:  Lab Results   Component Value Date/Time    Hemoglobin A1c 10.0 (H) 12/13/2022 10:13 AM    Hemoglobin A1c (POC) 7.2 09/11/2019 11:02 AM     Last Point of Care HGB A1C  Hemoglobin A1c (POC)   Date Value Ref Range Status   09/11/2019 7.2 % Final        Estimated Creatinine Clearance: 49.3 mL/min (A) (by C-G formula based on SCr of 1.43 mg/dL (H)). Medication reconciliation was completed during the visit. Medications Discontinued During This Encounter   Medication Reason    glipiZIDE (GLUCOTROL) 5 mg tablet Therapy Completed     No orders of the defined types were placed in this encounter. Patient verbalized understanding of the information presented and all of the patients questions were answered. AVS was handed to the patient. Patient advised to call the office with any additional questions or concerns. Notifications of recommendations will be sent to Dr. Geovanna Clarke MD for review. Patient will return to clinic in 6 week(s) for follow up.      Thank you for the consult,  Thu Clearance Amanda, MadayD, BCACP, 1970 [facility]    Program: Medical Group  CPA in place: Yes  Recommendation Provided To: Patient/Caregiver: 3 via In person  Intervention Detail: Discontinued Rx: 1, reason: Therapy Complete, Patient Access Assistance/Sample Provided, and Scheduled Appointment  Intervention Accepted By: Patient/Caregiver: 3  Time Spent (min):  40

## 2023-01-30 ENCOUNTER — OFFICE VISIT (OUTPATIENT)
Dept: INTERNAL MEDICINE CLINIC | Age: 78
End: 2023-01-30

## 2023-01-30 VITALS — WEIGHT: 240 LBS | OXYGEN SATURATION: 99 % | BODY MASS INDEX: 37.67 KG/M2 | HEART RATE: 85 BPM | HEIGHT: 67 IN

## 2023-01-30 DIAGNOSIS — E11.21 TYPE 2 DIABETES WITH NEPHROPATHY (HCC): ICD-10-CM

## 2023-01-30 DIAGNOSIS — E11.21 TYPE 2 DIABETES MELLITUS WITH DIABETIC NEPHROPATHY, WITHOUT LONG-TERM CURRENT USE OF INSULIN (HCC): Primary | ICD-10-CM

## 2023-01-30 NOTE — PATIENT INSTRUCTIONS
Stop glipizide  Continue metformin 1000 mg twice daily  Continue Jardiance 25 mg daily. Stay hydrated   Continue Ozempic 1 mg weekly    When you experience symptoms of low blood sugar (example: less than 70):  - Confirm low reading by checking your blood sugar.   - Then treat with 15 grams of carbohydrates (one-half cup of juice or regular soda, or 4-5 glucose tablets).   - Wait 15 minutes to recheck blood sugar.   - Then eat a protein containing meal/snack to prevent another low blood sugar episode. (example: peanut butter + crackers)

## 2023-02-15 RX ORDER — METFORMIN HYDROCHLORIDE 1000 MG/1
TABLET ORAL
Qty: 180 TABLET | Refills: 3 | Status: SHIPPED | OUTPATIENT
Start: 2023-02-15

## 2023-03-12 NOTE — PROGRESS NOTES
Pharmacy Progress Note - Diabetes Management    Assessment / Plan:   Diabetes Management:  - Per ADA guidelines, Pt's POC A1c today (7%) is at goal of < 7%. BP controlled. Congratulate patient on this progress   - Overall CGM within fasting and post prandial target ranges. Patient remains engaged. - Continue Ozempic 1 mg weekly   - Continue Jardiance 25 mg daily and Metformin 1 gm BID  - Continue with current lifestyle modification efforts. S/O: Mr. Cassandra Kim is a 76 y.o. male, referred by Dr. Lamar Brand MD, with a PMH of T2DM, HTN, arthritis, vertigo, was seen today for diabetes management follow up. Patient's last A1c was 10% (Dec 2022), 7.6% (Aug 2022), 8.2% (Feb 2022, Jun 2021), 7.8% (May 2020), 9.6% (Jan 2020). Patient was seen today together with Yesica Michel PharmD Candidate 2023. I have read and agree with Jyoti's documentation. Interim update:   Recall glipizide stopped at the last visit d/t hypoglycemia episodes. Current anti-hyperglycemic regimen include(s):    - Jardiance 25 mg daily  - Ozempic 1 mg weekly  - Metformin 1 gm BID     - Losartan 50 mg BID   - SCr 1.43 (Dec 2022)  - LDL 81 ;  (Dec 2022)    ROS:  Today, Pt endorses:  - Symptoms of Hyperglycemia: none  - Symptoms of Hypoglycemia: none    Blood Glucose Monitoring (BGM) or CGM:  Megan 3 CGM  Received DME shipment for sensor --- they sent Megan 2 CGM    30 days:      14 days:                Nutrition/Lifestyle Modifications:  Reports CGM has been very helpful in his meal/activity decisions   Wt down 11 lbs since January       The 10-year ASCVD risk score (Ashkan NVAA, et al., 2019) is: 42.9%       Vitals:   Wt Readings from Last 3 Encounters:   03/13/23 229 lb (103.9 kg)   01/30/23 240 lb (108.9 kg)   01/16/23 243 lb (110.2 kg)     BP Readings from Last 3 Encounters:   03/13/23 138/77   01/16/23 138/77   12/13/22 (!) 153/75     Pulse Readings from Last 3 Encounters:   03/13/23 76   01/30/23 85   01/16/23 80       Past Medical History:   Diagnosis Date    Arthritis     knees, feet, hands    COVID-19 10/2021    Diabetes mellitus ([address]     Does use hearing aid     Hearing loss     Has seen Dr. Demarcus Shine    Herpes zoster without complication 2961    Initially in February, recurrence in L arm in October    Hypertension     Obesity     Vertigo     BPPV; Has seen ENT Dr. Demarcus Shine, then Dr. Vanessa Ridley     Allergies   Allergen Reactions    Ace Inhibitors Cough       Current Outpatient Medications   Medication Sig    metFORMIN (GLUCOPHAGE) 1,000 mg tablet TAKE 1 TABLET TWICE A DAY WITH MEALS    empagliflozin (Jardiance) 25 mg tablet Take 1 Tablet by mouth daily. amLODIPine (NORVASC) 10 mg tablet TAKE 1 TABLET DAILY    losartan (COZAAR) 50 mg tablet TAKE 1 TABLET TWICE A DAY    Ozempic 1 mg/dose (4 mg/3 mL) pnij INJECT 1 MG UNDER THE SKIN EVERY 7 DAYS    sildenafil citrate (VIAGRA) 50 mg tablet Take 1 Tab by mouth as needed. miscellaneous medical supply misc Order for diabetic shoes and inserts. No current facility-administered medications for this visit. Lab Results   Component Value Date/Time    Sodium 141 12/13/2022 10:13 AM    Potassium 4.0 12/13/2022 10:13 AM    Chloride 109 (H) 12/13/2022 10:13 AM    CO2 29 12/13/2022 10:13 AM    Anion gap 3 (L) 12/13/2022 10:13 AM    Glucose 119 (H) 12/13/2022 10:13 AM    BUN 16 12/13/2022 10:13 AM    Creatinine 1.43 (H) 12/13/2022 10:13 AM    BUN/Creatinine ratio 11 (L) 12/13/2022 10:13 AM    GFR est AA >60 08/02/2022 12:48 PM    GFR est non-AA 50 (L) 08/02/2022 12:48 PM    Calcium 8.8 12/13/2022 10:13 AM    Bilirubin, total 0.9 12/13/2022 10:13 AM    Alk.  phosphatase 123 (H) 12/13/2022 10:13 AM    Protein, total 8.1 12/13/2022 10:13 AM    Albumin 3.8 12/13/2022 10:13 AM    Globulin 4.3 (H) 12/13/2022 10:13 AM    A-G Ratio 0.9 (L) 12/13/2022 10:13 AM    ALT (SGPT) 42 12/13/2022 10:13 AM       Lab Results   Component Value Date/Time    Cholesterol, total 160 12/13/2022 10:13 AM HDL Cholesterol 57 12/13/2022 10:13 AM    LDL, calculated 84.6 12/13/2022 10:13 AM    VLDL, calculated 18.4 12/13/2022 10:13 AM    Triglyceride 92 12/13/2022 10:13 AM    CHOL/HDL Ratio 2.8 12/13/2022 10:13 AM       Lab Results   Component Value Date/Time    WBC 10.1 12/13/2022 10:13 AM    HGB 13.3 12/13/2022 10:13 AM    HCT 42.7 12/13/2022 10:13 AM    PLATELET 432 61/82/9528 10:13 AM    MCV 91.2 12/13/2022 10:13 AM       Lab Results   Component Value Date/Time    Microalb/Creat ratio (ug/mg creat.) 45 (H) 02/07/2022 09:22 AM       HbA1c:  Lab Results   Component Value Date/Time    Hemoglobin A1c 10.0 (H) 12/13/2022 10:13 AM    Hemoglobin A1c (POC) 7.0 (A) 03/13/2023 11:48 AM     No components found for: 2     Last Point of Care HGB A1C  Hemoglobin A1c (POC)   Date Value Ref Range Status   03/13/2023 7.0 (A) 4.8 - 5.6 % Final        Estimated Creatinine Clearance: 48.1 mL/min (A) (by C-G formula based on SCr of 1.43 mg/dL (H)). Medication reconciliation was completed during the visit. There are no discontinued medications. Orders Placed This Encounter    AMB POC HEMOGLOBIN A1C     Patient verbalized understanding of the information presented and all of the patients questions were answered. AVS was handed to the patient. Patient advised to call the office with any additional questions or concerns. Notifications of recommendations will be sent to Dr. Shaka Nunez MD for review. Patient will return to clinic in 12 week(s) for follow up.      Thank you for the consult,  Rubi Mays, PharmD, BCACP, 1970 [facility] Only    Program: Medical Group  CPA in place: Yes  Recommendation Provided To: Patient/Caregiver: 2 via In person  Intervention Detail: Lab(s) Ordered and Scheduled Appointment  Intervention Accepted By: Patient/Caregiver: 2  Gap Closed?: Yes  Time Spent (min): 45

## 2023-03-13 ENCOUNTER — OFFICE VISIT (OUTPATIENT)
Dept: INTERNAL MEDICINE CLINIC | Age: 78
End: 2023-03-13
Payer: MEDICARE

## 2023-03-13 VITALS
HEART RATE: 76 BPM | WEIGHT: 229 LBS | SYSTOLIC BLOOD PRESSURE: 138 MMHG | BODY MASS INDEX: 35.94 KG/M2 | DIASTOLIC BLOOD PRESSURE: 77 MMHG | HEIGHT: 67 IN

## 2023-03-13 DIAGNOSIS — E11.21 TYPE 2 DIABETES MELLITUS WITH DIABETIC NEPHROPATHY, WITHOUT LONG-TERM CURRENT USE OF INSULIN (HCC): Primary | ICD-10-CM

## 2023-03-13 DIAGNOSIS — E11.21 TYPE 2 DIABETES WITH NEPHROPATHY (HCC): ICD-10-CM

## 2023-03-13 LAB — HBA1C MFR BLD HPLC: 7 % (ref 4.8–5.6)

## 2023-03-13 PROCEDURE — 83036 HEMOGLOBIN GLYCOSYLATED A1C: CPT | Performed by: PHARMACIST

## 2023-03-13 NOTE — PROGRESS NOTES
Pharmacy Progress Note - Diabetes Management    Assessment / Plan:   Diabetes Management:  - Per ADA guidelines, Pt's A1c is at goal of < 7%. A1c POC today was 7.0%. - Based on current CGM trend, glycemic control has improved and does not have as many hypoglycemic events compared to previously. - Continue Metformin 1 g BID  - Continue Jardiance 25 mg daily  - Continue Ozempic 1 mg weekly  - Continue with current lifestyle modifications with weight loss, dietary changes, and exercise   - Continue checking/scanning throughout the day        S/O: Mr. Carolina Ramírez is a 76 y.o. male, referred by Dr. Vin Coy MD, with a PMH of T2DM, HTN, arthritis, vertigo, was seen today for diabetes management. Patient's last A1c was 10% (Dec 2022), 7.6% (Aug 2022), 8.2% (Feb 2022, Jun 2021), 7.8% (May 2020), 9.6% (Jan 2020).     Interim update:   Reports not having anymore hypoglycemic episodes since stopping Glipizide  Reported reducing carbohydrate intake and avoiding fruit juices  Missed a dose of losartan (a couple of days ago in the evening)      Current anti-hyperglycemic regimen include(s):    - Metformin 1 g BID  - Jardiance 25 mg daily  - Ozempic 1 mg weekly      - Losartan 50 mg BID  - SCr 1.43 (Dec 2022)  - LDL 81;  (12/22)      ROS:  Today, Pt endorses:  - Symptoms of Hyperglycemia: none  - Symptoms of Hypoglycemia: none    Blood Glucose Monitoring (BGM) or CGM:  - Brought in home glucometer/blood glucose log/CGM reader today:  yes  - See Dr. Petrona Soto note      Nutrition/Lifestyle Modifications:  - Eats 2-3 meals/day ;  - Reports snacking on nuts, drinks unsweet tea or diet soda, seltzer water  - Otherwise, denies any dietary changes (tries to keep meals consistent to avoid spikes)      The 10-year ASCVD risk score (Ashkan NAVA, et al., 2019) is: 42.9%    Values used to calculate the score:      Age: 76 years      Sex: Male      Is Non- : Yes      Diabetic: Yes      Tobacco smoker: No      Systolic Blood Pressure: 644 mmHg      Is BP treated: Yes      HDL Cholesterol: 57 MG/DL      Total Cholesterol: 160 MG/DL     Vitals: Wt Readings from Last 3 Encounters:   03/13/23 229 lb (103.9 kg)   01/30/23 240 lb (108.9 kg)   01/16/23 243 lb (110.2 kg)     BP Readings from Last 3 Encounters:   03/13/23 138/77   01/16/23 138/77   12/13/22 (!) 153/75     Pulse Readings from Last 3 Encounters:   03/13/23 76   01/30/23 85   01/16/23 88       Past Medical History:   Diagnosis Date    Arthritis     knees, feet, hands    COVID-19 10/2021    Diabetes mellitus ([address]     Does use hearing aid     Hearing loss     Has seen Dr. Shazia Nolan    Herpes zoster without complication 3124    Initially in February, recurrence in L arm in October    Hypertension     Obesity     Vertigo     BPPV; Has seen ENT Dr. Shazia Nolan, then Dr. Vera Lyman     Allergies   Allergen Reactions    Ace Inhibitors Cough       Current Outpatient Medications   Medication Sig    metFORMIN (GLUCOPHAGE) 1,000 mg tablet TAKE 1 TABLET TWICE A DAY WITH MEALS    empagliflozin (Jardiance) 25 mg tablet Take 1 Tablet by mouth daily. amLODIPine (NORVASC) 10 mg tablet TAKE 1 TABLET DAILY    losartan (COZAAR) 50 mg tablet TAKE 1 TABLET TWICE A DAY    Ozempic 1 mg/dose (4 mg/3 mL) pnij INJECT 1 MG UNDER THE SKIN EVERY 7 DAYS    sildenafil citrate (VIAGRA) 50 mg tablet Take 1 Tab by mouth as needed. miscellaneous medical supply misc Order for diabetic shoes and inserts. No current facility-administered medications for this visit.        Lab Results   Component Value Date/Time    Sodium 141 12/13/2022 10:13 AM    Potassium 4.0 12/13/2022 10:13 AM    Chloride 109 (H) 12/13/2022 10:13 AM    CO2 29 12/13/2022 10:13 AM    Anion gap 3 (L) 12/13/2022 10:13 AM    Glucose 119 (H) 12/13/2022 10:13 AM    BUN 16 12/13/2022 10:13 AM    Creatinine 1.43 (H) 12/13/2022 10:13 AM    BUN/Creatinine ratio 11 (L) 12/13/2022 10:13 AM    GFR est AA >60 08/02/2022 12:48 PM    GFR est non-AA 50 (L) 08/02/2022 12:48 PM    Calcium 8.8 12/13/2022 10:13 AM    Bilirubin, total 0.9 12/13/2022 10:13 AM    Alk. phosphatase 123 (H) 12/13/2022 10:13 AM    Protein, total 8.1 12/13/2022 10:13 AM    Albumin 3.8 12/13/2022 10:13 AM    Globulin 4.3 (H) 12/13/2022 10:13 AM    A-G Ratio 0.9 (L) 12/13/2022 10:13 AM    ALT (SGPT) 42 12/13/2022 10:13 AM       Lab Results   Component Value Date/Time    Cholesterol, total 160 12/13/2022 10:13 AM    HDL Cholesterol 57 12/13/2022 10:13 AM    LDL, calculated 84.6 12/13/2022 10:13 AM    VLDL, calculated 18.4 12/13/2022 10:13 AM    Triglyceride 92 12/13/2022 10:13 AM    CHOL/HDL Ratio 2.8 12/13/2022 10:13 AM       Lab Results   Component Value Date/Time    WBC 10.1 12/13/2022 10:13 AM    HGB 13.3 12/13/2022 10:13 AM    HCT 42.7 12/13/2022 10:13 AM    PLATELET 062 19/48/2732 10:13 AM    MCV 91.2 12/13/2022 10:13 AM       Lab Results   Component Value Date/Time    Microalb/Creat ratio (ug/mg creat.) 45 (H) 02/07/2022 09:22 AM       HbA1c:  Lab Results   Component Value Date/Time    Hemoglobin A1c 10.0 (H) 12/13/2022 10:13 AM    Hemoglobin A1c (POC) 7.0 (A) 03/13/2023 11:48 AM     No components found for: 2     Last Point of Care HGB A1C  Hemoglobin A1c (POC)   Date Value Ref Range Status   03/13/2023 7.0 (A) 4.8 - 5.6 % Final        Estimated Creatinine Clearance: 48.1 mL/min (A) (by C-G formula based on SCr of 1.43 mg/dL (H)). Medication reconciliation was completed during the visit. There are no discontinued medications. Patient verbalized understanding of the information presented and all of the patients questions were answered. AVS was handed to the patient. Patient advised to call the office with any additional questions or concerns. Notifications of recommendations will be sent to Dr. Freeman Nolen MD for review. Patient will return to clinic in 12 week(s) for follow up.      Thank you for the consult,  Cornelio Blackburn, MadayD Candidate [address]

## 2023-03-13 NOTE — PATIENT INSTRUCTIONS
Your A1c today was 7.0%. Your goal is to be below 7%. Keep up the great work!    Continue metformin 1000 mg twice daily  Continue Ozempic 1 mg weekly  Continue Jardiance 25 mg daily
Detail Level: Generalized
Detail Level: Zone
Detail Level: Simple

## 2023-03-14 RX ORDER — SEMAGLUTIDE 1.34 MG/ML
INJECTION, SOLUTION SUBCUTANEOUS
Qty: 12 EACH | Refills: 3 | Status: SHIPPED | OUTPATIENT
Start: 2023-03-14

## 2023-05-10 RX ORDER — AMLODIPINE BESYLATE 10 MG/1
TABLET ORAL
Qty: 90 TABLET | Refills: 3 | Status: SHIPPED | OUTPATIENT
Start: 2023-05-10

## 2023-08-19 NOTE — PROGRESS NOTES
Pharmacy Progress Note - Diabetes Management    Assessment / Plan:   Diabetes Management:  - Per ADA guidelines, Pt's A1c is  at goal of < 7%. BP <140/90  - Overall CGM patterns well within target goals. - Mindful of transient hypoglycemia frequency  - Continue Jardiance 25 mg daily  - Continue metformin 1 gm BID  - Continue Ozempic 1 mg weekly  - Will wait for lab results to determine f/up interval and need for med adjustments     S/O: Mr. Keli Kay is a 78 y.o. male, referred by Dr. Rajwinder Pavon MD, with a PMH of T2DM, HTN, arthritis, vertigo, was seen today for diabetes management follow up. Patient's last A1c was 6.5% (April 2023), 7% (March 2023), 10% (Dec 2022), 7.6% (Aug 2022), 8.2% (Feb 2022, Jun 2021), 7.8% (May 2020), 9.6% (Jan 2020). Patient seen following his PCP appt today. Current anti-hyperglycemic regimen include(s):    - Metformin 1 gm BID  - Jardiance 25 mg daily  - Ozempic 1 mg weekly Saturday    - Losartan 50 mg BID  - SCr 1.43 (Dec 2022) --> 1.28 (April 2023)  - LDL 59 ; TG 78 (April 2023)     ROS:  Today, Pt endorses:  - Symptoms of Hyperglycemia: none  - Symptoms of Hypoglycemia: none    Blood Glucose Monitoring (BGM) or CGM:  Marc 3 CGM ; mobile leila     30 days:      14 days:                  Nutrition/Lifestyle Modifications: Wt down 3 lbs since last visit  Continues to monitor carb intake     Last night's dinner: caesar salad + seafood + diet soda + water    The ASCVD Risk score (Shaquille SLOAN, et al., 2019) failed to calculate for the following reasons: The 2019 ASCVD risk score is only valid for ages 36 to 78     Vitals:   Wt Readings from Last 3 Encounters:   08/23/23 215 lb (97.5 kg)   06/12/23 218 lb (98.9 kg)   04/13/23 226 lb 6.4 oz (102.7 kg)     BP Readings from Last 3 Encounters:   08/23/23 138/72   06/12/23 (!) 151/70   04/13/23 (!) 156/77     Pulse Readings from Last 3 Encounters:   08/23/23 66   06/12/23 70   04/13/23 79       Past Medical History:

## 2023-08-23 ENCOUNTER — PHARMACY VISIT (OUTPATIENT)
Age: 78
End: 2023-08-23
Payer: MEDICARE

## 2023-08-23 ENCOUNTER — OFFICE VISIT (OUTPATIENT)
Age: 78
End: 2023-08-23
Payer: MEDICARE

## 2023-08-23 VITALS
HEART RATE: 66 BPM | SYSTOLIC BLOOD PRESSURE: 138 MMHG | TEMPERATURE: 97.9 F | OXYGEN SATURATION: 98 % | RESPIRATION RATE: 19 BRPM | WEIGHT: 215 LBS | BODY MASS INDEX: 33.74 KG/M2 | DIASTOLIC BLOOD PRESSURE: 72 MMHG | HEIGHT: 67 IN

## 2023-08-23 DIAGNOSIS — I10 ESSENTIAL (PRIMARY) HYPERTENSION: ICD-10-CM

## 2023-08-23 DIAGNOSIS — Z79.4 TYPE 2 DIABETES MELLITUS WITH DIABETIC NEPHROPATHY, WITH LONG-TERM CURRENT USE OF INSULIN (HCC): Primary | ICD-10-CM

## 2023-08-23 DIAGNOSIS — E66.01 MORBID (SEVERE) OBESITY DUE TO EXCESS CALORIES (HCC): ICD-10-CM

## 2023-08-23 DIAGNOSIS — E11.21 TYPE 2 DIABETES MELLITUS WITH DIABETIC NEPHROPATHY, WITH LONG-TERM CURRENT USE OF INSULIN (HCC): ICD-10-CM

## 2023-08-23 DIAGNOSIS — N18.30 STAGE 3 CHRONIC KIDNEY DISEASE, UNSPECIFIED WHETHER STAGE 3A OR 3B CKD (HCC): ICD-10-CM

## 2023-08-23 DIAGNOSIS — Z00.00 MEDICARE ANNUAL WELLNESS VISIT, SUBSEQUENT: Primary | ICD-10-CM

## 2023-08-23 DIAGNOSIS — E11.21 TYPE 2 DIABETES MELLITUS WITH DIABETIC NEPHROPATHY, WITH LONG-TERM CURRENT USE OF INSULIN (HCC): Primary | ICD-10-CM

## 2023-08-23 DIAGNOSIS — Z79.4 TYPE 2 DIABETES MELLITUS WITH DIABETIC NEPHROPATHY, WITH LONG-TERM CURRENT USE OF INSULIN (HCC): ICD-10-CM

## 2023-08-23 PROCEDURE — 3044F HG A1C LEVEL LT 7.0%: CPT | Performed by: INTERNAL MEDICINE

## 2023-08-23 PROCEDURE — 1123F ACP DISCUSS/DSCN MKR DOCD: CPT | Performed by: INTERNAL MEDICINE

## 2023-08-23 PROCEDURE — 3078F DIAST BP <80 MM HG: CPT | Performed by: INTERNAL MEDICINE

## 2023-08-23 PROCEDURE — G0439 PPPS, SUBSEQ VISIT: HCPCS | Performed by: INTERNAL MEDICINE

## 2023-08-23 PROCEDURE — 99214 OFFICE O/P EST MOD 30 MIN: CPT | Performed by: INTERNAL MEDICINE

## 2023-08-23 PROCEDURE — 3075F SYST BP GE 130 - 139MM HG: CPT | Performed by: INTERNAL MEDICINE

## 2023-08-23 SDOH — ECONOMIC STABILITY: INCOME INSECURITY: HOW HARD IS IT FOR YOU TO PAY FOR THE VERY BASICS LIKE FOOD, HOUSING, MEDICAL CARE, AND HEATING?: NOT VERY HARD

## 2023-08-23 SDOH — ECONOMIC STABILITY: FOOD INSECURITY: WITHIN THE PAST 12 MONTHS, THE FOOD YOU BOUGHT JUST DIDN'T LAST AND YOU DIDN'T HAVE MONEY TO GET MORE.: NEVER TRUE

## 2023-08-23 SDOH — ECONOMIC STABILITY: HOUSING INSECURITY
IN THE LAST 12 MONTHS, WAS THERE A TIME WHEN YOU DID NOT HAVE A STEADY PLACE TO SLEEP OR SLEPT IN A SHELTER (INCLUDING NOW)?: NO

## 2023-08-23 SDOH — ECONOMIC STABILITY: FOOD INSECURITY: WITHIN THE PAST 12 MONTHS, YOU WORRIED THAT YOUR FOOD WOULD RUN OUT BEFORE YOU GOT MONEY TO BUY MORE.: NEVER TRUE

## 2023-08-23 ASSESSMENT — PATIENT HEALTH QUESTIONNAIRE - PHQ9
SUM OF ALL RESPONSES TO PHQ QUESTIONS 1-9: 0
2. FEELING DOWN, DEPRESSED OR HOPELESS: 0
SUM OF ALL RESPONSES TO PHQ9 QUESTIONS 1 & 2: 0
SUM OF ALL RESPONSES TO PHQ QUESTIONS 1-9: 0
SUM OF ALL RESPONSES TO PHQ QUESTIONS 1-9: 0
SUM OF ALL RESPONSES TO PHQ9 QUESTIONS 1 & 2: 0
2. FEELING DOWN, DEPRESSED OR HOPELESS: 0
SUM OF ALL RESPONSES TO PHQ QUESTIONS 1-9: 0
SUM OF ALL RESPONSES TO PHQ QUESTIONS 1-9: 0
1. LITTLE INTEREST OR PLEASURE IN DOING THINGS: 0
SUM OF ALL RESPONSES TO PHQ QUESTIONS 1-9: 0
1. LITTLE INTEREST OR PLEASURE IN DOING THINGS: 0

## 2023-08-23 ASSESSMENT — ANXIETY QUESTIONNAIRES
IF YOU CHECKED OFF ANY PROBLEMS ON THIS QUESTIONNAIRE, HOW DIFFICULT HAVE THESE PROBLEMS MADE IT FOR YOU TO DO YOUR WORK, TAKE CARE OF THINGS AT HOME, OR GET ALONG WITH OTHER PEOPLE: NOT DIFFICULT AT ALL
7. FEELING AFRAID AS IF SOMETHING AWFUL MIGHT HAPPEN: 0
4. TROUBLE RELAXING: 0
1. FEELING NERVOUS, ANXIOUS, OR ON EDGE: 0
6. BECOMING EASILY ANNOYED OR IRRITABLE: 0
2. NOT BEING ABLE TO STOP OR CONTROL WORRYING: 0
5. BEING SO RESTLESS THAT IT IS HARD TO SIT STILL: 0
GAD7 TOTAL SCORE: 0
3. WORRYING TOO MUCH ABOUT DIFFERENT THINGS: 0

## 2023-08-23 ASSESSMENT — LIFESTYLE VARIABLES
HOW OFTEN DO YOU HAVE A DRINK CONTAINING ALCOHOL: NEVER
HOW MANY STANDARD DRINKS CONTAINING ALCOHOL DO YOU HAVE ON A TYPICAL DAY: PATIENT DOES NOT DRINK

## 2023-08-23 NOTE — PROGRESS NOTES
1. \"Have you been to the ER, urgent care clinic since your last visit? Hospitalized since your last visit? \" No    2. \"Have you seen or consulted any other health care providers outside of the [address] [place] [address] since your last visit? \" No     3. For patients aged 43-73: Has the patient had a colonoscopy / FIT/ Cologuard? NA - based on age      If the patient is female:    4. For patients aged 43-66: Has the patient had a mammogram within the past 2 years? NA - based on age or sex      11. For patients aged 21-65: Has the patient had a pap smear?  NA - based on age or sex

## 2023-08-23 NOTE — PROGRESS NOTES
SUBJECTIVE:   Mr. Margaret Ceja is a 78 y.o. male who is here for follow up of routine medical issues. Previously he saw Dr. Christophe Emanuel. Chief Complaint   Patient presents with    Diabetes     Gluc 90s-low 100s. He has been diabetic since about 2005. He no longer sees Dr. Dawood Schroeder. Hanna Barrientos, PharmD has been working with him. Last A1C down to 6.5. His BP at home has been normal. Borderline here this morning. Vertigo \"doing fine\": He has been to see Dr. Karyle Doles. Now seeing Dr. Shar Rossi for retinal screening. Knee pain L > R. Continues to have knee pain in L knee--Previously he saw Dr. Bakari Lira. As noted 2018: \"he gave me a cortisone shot and said it was bone-on-bone there. \"    At this time, he is otherwise doing well and has brought no other complaints to my attention today. For a list of the medical issues addressed today, see the assessment and plan below. PMH:   Past Medical History:   Diagnosis Date    Arthritis     knees, feet, hands    COVID-19 10/2021    Diabetes mellitus ([address])     Does use hearing aid     Hearing loss     Has seen Dr. Asha Esquivel    Herpes zoster without complication 0531    Initially in February, recurrence in L arm in October    Hypertension     Obesity     Vertigo     BPPV; Has seen ENT Dr. Asha Esquivel, then Dr. Karyle Doles       Past Surgical History:   Procedure Laterality Date    CATARACT REMOVAL       2009    COLONOSCOPY N/A 7/8/2019    COLONOSCOPY performed by Susie Miner MD at [facility] ENDOSCOPY    COLONOSCOPY FLX DX W/COLLJ SPEC WHEN PFRMD  5/15/2014    Dr. Bernstein Cease  7/8/2019         ROTATOR CUFF REPAIR Left 12/13/2013    shoulder    TONSILLECTOMY  1950    as a child       All: He is allergic to ace inhibitors.     Current Outpatient Medications on File Prior to Visit   Medication Sig Dispense Refill    losartan (COZAAR) 50 MG tablet Take 1 tablet by mouth in the morning and at bedtime 90 tablet 6    amLODIPine (NORVASC) 10 MG tablet TAKE 1 TABLET DAILY 90 tablet 3

## 2023-08-24 LAB
ALBUMIN SERPL-MCNC: 4.2 G/DL (ref 3.5–5)
ALBUMIN/GLOB SERPL: 1 (ref 1.1–2.2)
ALP SERPL-CCNC: 70 U/L (ref 45–117)
ALT SERPL-CCNC: 20 U/L (ref 12–78)
ANION GAP SERPL CALC-SCNC: 6 MMOL/L (ref 5–15)
AST SERPL-CCNC: 12 U/L (ref 15–37)
BASOPHILS # BLD: 0.1 K/UL (ref 0–0.1)
BASOPHILS NFR BLD: 1 % (ref 0–1)
BILIRUB SERPL-MCNC: 1.3 MG/DL (ref 0.2–1)
BUN SERPL-MCNC: 20 MG/DL (ref 6–20)
BUN/CREAT SERPL: 16 (ref 12–20)
CALCIUM SERPL-MCNC: 9.6 MG/DL (ref 8.5–10.1)
CHLORIDE SERPL-SCNC: 110 MMOL/L (ref 97–108)
CHOLEST SERPL-MCNC: 152 MG/DL
CO2 SERPL-SCNC: 25 MMOL/L (ref 21–32)
CREAT SERPL-MCNC: 1.24 MG/DL (ref 0.7–1.3)
DIFFERENTIAL METHOD BLD: ABNORMAL
EOSINOPHIL # BLD: 0.1 K/UL (ref 0–0.4)
EOSINOPHIL NFR BLD: 1 % (ref 0–7)
ERYTHROCYTE [DISTWIDTH] IN BLOOD BY AUTOMATED COUNT: 15.6 % (ref 11.5–14.5)
EST. AVERAGE GLUCOSE BLD GHB EST-MCNC: 143 MG/DL
GLOBULIN SER CALC-MCNC: 4.2 G/DL (ref 2–4)
GLUCOSE SERPL-MCNC: 106 MG/DL (ref 65–100)
HBA1C MFR BLD: 6.6 % (ref 4–5.6)
HCT VFR BLD AUTO: 45.5 % (ref 36.6–50.3)
HDLC SERPL-MCNC: 64 MG/DL
HDLC SERPL: 2.4 (ref 0–5)
HGB BLD-MCNC: 14.3 G/DL (ref 12.1–17)
IMM GRANULOCYTES # BLD AUTO: 0 K/UL (ref 0–0.04)
IMM GRANULOCYTES NFR BLD AUTO: 0 % (ref 0–0.5)
LDLC SERPL CALC-MCNC: 73.6 MG/DL (ref 0–100)
LYMPHOCYTES # BLD: 2.2 K/UL (ref 0.8–3.5)
LYMPHOCYTES NFR BLD: 30 % (ref 12–49)
MCH RBC QN AUTO: 28.5 PG (ref 26–34)
MCHC RBC AUTO-ENTMCNC: 31.4 G/DL (ref 30–36.5)
MCV RBC AUTO: 90.8 FL (ref 80–99)
MONOCYTES # BLD: 0.6 K/UL (ref 0–1)
MONOCYTES NFR BLD: 8 % (ref 5–13)
NEUTS SEG # BLD: 4.4 K/UL (ref 1.8–8)
NEUTS SEG NFR BLD: 60 % (ref 32–75)
NRBC # BLD: 0 K/UL (ref 0–0.01)
NRBC BLD-RTO: 0 PER 100 WBC
PLATELET # BLD AUTO: 221 K/UL (ref 150–400)
PMV BLD AUTO: 11.1 FL (ref 8.9–12.9)
POTASSIUM SERPL-SCNC: 4.1 MMOL/L (ref 3.5–5.1)
PROT SERPL-MCNC: 8.4 G/DL (ref 6.4–8.2)
RBC # BLD AUTO: 5.01 M/UL (ref 4.1–5.7)
SODIUM SERPL-SCNC: 141 MMOL/L (ref 136–145)
TRIGL SERPL-MCNC: 72 MG/DL
VLDLC SERPL CALC-MCNC: 14.4 MG/DL
WBC # BLD AUTO: 7.4 K/UL (ref 4.1–11.1)

## 2023-10-02 NOTE — TELEPHONE ENCOUNTER
PCP: Mariposa Eric MD    Last appt: [unfilled]  Future Appointments   Date Time Provider Jassi Artisi   6/12/2023 11:30 AM Dai Fitch [facility] BS AMB   8/23/2023  9:00 AM Heena Forman MD [facility] BS AMB       Requested Prescriptions     Pending Prescriptions Disp Refills    amLODIPine (NORVASC) 10 MG tablet [Pharmacy Med Name: AMLODIPINE BESYLATE TABS 10MG] 90 tablet 3     Sig: TAKE 1 TABLET DAILY negative...

## 2023-12-11 ENCOUNTER — OFFICE VISIT (OUTPATIENT)
Age: 78
End: 2023-12-11
Payer: MEDICARE

## 2023-12-11 VITALS
OXYGEN SATURATION: 97 % | HEIGHT: 67 IN | RESPIRATION RATE: 16 BRPM | HEART RATE: 79 BPM | TEMPERATURE: 98.8 F | BODY MASS INDEX: 33.27 KG/M2 | SYSTOLIC BLOOD PRESSURE: 139 MMHG | DIASTOLIC BLOOD PRESSURE: 68 MMHG | WEIGHT: 212 LBS

## 2023-12-11 DIAGNOSIS — E11.21 TYPE 2 DIABETES MELLITUS WITH DIABETIC NEPHROPATHY, WITH LONG-TERM CURRENT USE OF INSULIN (HCC): Primary | ICD-10-CM

## 2023-12-11 DIAGNOSIS — I10 ESSENTIAL (PRIMARY) HYPERTENSION: ICD-10-CM

## 2023-12-11 DIAGNOSIS — Z23 NEEDS FLU SHOT: ICD-10-CM

## 2023-12-11 DIAGNOSIS — Z79.4 TYPE 2 DIABETES MELLITUS WITH DIABETIC NEPHROPATHY, WITH LONG-TERM CURRENT USE OF INSULIN (HCC): Primary | ICD-10-CM

## 2023-12-11 DIAGNOSIS — N18.30 STAGE 3 CHRONIC KIDNEY DISEASE, UNSPECIFIED WHETHER STAGE 3A OR 3B CKD (HCC): ICD-10-CM

## 2023-12-11 PROCEDURE — PBSHW INFLUENZA, FLUAD, (AGE 65 Y+), IM, PF, 0.5 ML: Performed by: INTERNAL MEDICINE

## 2023-12-11 PROCEDURE — 99214 OFFICE O/P EST MOD 30 MIN: CPT | Performed by: INTERNAL MEDICINE

## 2023-12-11 PROCEDURE — 3044F HG A1C LEVEL LT 7.0%: CPT | Performed by: INTERNAL MEDICINE

## 2023-12-11 PROCEDURE — 90694 VACC AIIV4 NO PRSRV 0.5ML IM: CPT | Performed by: INTERNAL MEDICINE

## 2023-12-11 PROCEDURE — 3075F SYST BP GE 130 - 139MM HG: CPT | Performed by: INTERNAL MEDICINE

## 2023-12-11 PROCEDURE — 3078F DIAST BP <80 MM HG: CPT | Performed by: INTERNAL MEDICINE

## 2023-12-11 PROCEDURE — 1123F ACP DISCUSS/DSCN MKR DOCD: CPT | Performed by: INTERNAL MEDICINE

## 2023-12-11 RX ORDER — LATANOPROST 50 UG/ML
2 SOLUTION/ DROPS OPHTHALMIC NIGHTLY
COMMUNITY
Start: 2023-11-03

## 2023-12-11 ASSESSMENT — PATIENT HEALTH QUESTIONNAIRE - PHQ9
SUM OF ALL RESPONSES TO PHQ QUESTIONS 1-9: 0
SUM OF ALL RESPONSES TO PHQ QUESTIONS 1-9: 0
2. FEELING DOWN, DEPRESSED OR HOPELESS: 0
SUM OF ALL RESPONSES TO PHQ QUESTIONS 1-9: 0
SUM OF ALL RESPONSES TO PHQ QUESTIONS 1-9: 0
1. LITTLE INTEREST OR PLEASURE IN DOING THINGS: 0
SUM OF ALL RESPONSES TO PHQ9 QUESTIONS 1 & 2: 0

## 2023-12-11 NOTE — PROGRESS NOTES
SUBJECTIVE:   Mr. Taj Garcia is a 78 y.o. male who is here for follow up of routine medical issues. Previously he saw Dr. Jose Simpson. Chief Complaint   Patient presents with    Follow-up    Diabetes     Gluc 90s-low 100s (except for right after cortisone shot in knee). He has been diabetic since about 2005. He no longer sees Dr. Bronson Carpenter. Ioana Dominique, PharmD has been working with him. Last A1C down to 6.5. His BP at home has been normal. Borderline here this morning. Vertigo \"doing fine\": He has been to see Dr. Ranjeet Butler in the past.  Now seeing Dr. Nilsa Torres for retinal screening. Knee pain L > R. He has had recent steroid injection. He is currently seeing Dr. Isha Parnell (Previously he saw Dr. Narendra Flores). As noted 2018: \"he gave me a cortisone shot and said it was bone-on-bone there. \"    At this time, he is otherwise doing well and has brought no other complaints to my attention today. For a list of the medical issues addressed today, see the assessment and plan below. PMH:   Past Medical History:   Diagnosis Date    Arthritis     knees, feet, hands    COVID-19 10/2021    Diabetes mellitus ([address])     Does use hearing aid     Hearing loss     Has seen Dr. Page Barbosa    Herpes zoster without complication 3260    Initially in February, recurrence in L arm in October    Hypertension     Obesity     Vertigo     BPPV; Has seen ENT Dr. Page Barbosa, then Dr. Ranjeet Butler       Past Surgical History:   Procedure Laterality Date    CATARACT REMOVAL       2009    COLONOSCOPY N/A 7/8/2019    COLONOSCOPY performed by Lauren Norton MD at [facility] ENDOSCOPY    COLONOSCOPY FLX DX W/COLLJ SPEC WHEN PFRMD  5/15/2014    Dr. Guillermina Brown  7/8/2019         ROTATOR CUFF REPAIR Left 12/13/2013    shoulder    TONSILLECTOMY  1950    as a child       All: He is allergic to ace inhibitors.     Current Outpatient Medications on File Prior to Visit   Medication Sig Dispense Refill    latanoprost (XALATAN) 0.005 % ophthalmic solution Place 2 drops into

## 2023-12-12 LAB
ALBUMIN SERPL-MCNC: 3.7 G/DL (ref 3.5–5)
ALBUMIN/GLOB SERPL: 1 (ref 1.1–2.2)
ALP SERPL-CCNC: 72 U/L (ref 45–117)
ALT SERPL-CCNC: 20 U/L (ref 12–78)
ANION GAP SERPL CALC-SCNC: 5 MMOL/L (ref 5–15)
AST SERPL-CCNC: 14 U/L (ref 15–37)
BASOPHILS # BLD: 0.1 K/UL (ref 0–0.1)
BASOPHILS NFR BLD: 1 % (ref 0–1)
BILIRUB SERPL-MCNC: 1.1 MG/DL (ref 0.2–1)
BUN SERPL-MCNC: 22 MG/DL (ref 6–20)
BUN/CREAT SERPL: 18 (ref 12–20)
CALCIUM SERPL-MCNC: 9.2 MG/DL (ref 8.5–10.1)
CHLORIDE SERPL-SCNC: 111 MMOL/L (ref 97–108)
CHOLEST SERPL-MCNC: 160 MG/DL
CO2 SERPL-SCNC: 26 MMOL/L (ref 21–32)
CREAT SERPL-MCNC: 1.25 MG/DL (ref 0.7–1.3)
DIFFERENTIAL METHOD BLD: ABNORMAL
EOSINOPHIL # BLD: 0.1 K/UL (ref 0–0.4)
EOSINOPHIL NFR BLD: 1 % (ref 0–7)
ERYTHROCYTE [DISTWIDTH] IN BLOOD BY AUTOMATED COUNT: 15.3 % (ref 11.5–14.5)
EST. AVERAGE GLUCOSE BLD GHB EST-MCNC: 151 MG/DL
GLOBULIN SER CALC-MCNC: 3.8 G/DL (ref 2–4)
GLUCOSE SERPL-MCNC: 98 MG/DL (ref 65–100)
HBA1C MFR BLD: 6.9 % (ref 4–5.6)
HCT VFR BLD AUTO: 43.3 % (ref 36.6–50.3)
HDLC SERPL-MCNC: 73 MG/DL
HDLC SERPL: 2.2 (ref 0–5)
HGB BLD-MCNC: 13.8 G/DL (ref 12.1–17)
IMM GRANULOCYTES # BLD AUTO: 0 K/UL (ref 0–0.04)
IMM GRANULOCYTES NFR BLD AUTO: 0 % (ref 0–0.5)
LDLC SERPL CALC-MCNC: 76 MG/DL (ref 0–100)
LYMPHOCYTES # BLD: 1.9 K/UL (ref 0.8–3.5)
LYMPHOCYTES NFR BLD: 24 % (ref 12–49)
MCH RBC QN AUTO: 29.1 PG (ref 26–34)
MCHC RBC AUTO-ENTMCNC: 31.9 G/DL (ref 30–36.5)
MCV RBC AUTO: 91.2 FL (ref 80–99)
MONOCYTES # BLD: 0.6 K/UL (ref 0–1)
MONOCYTES NFR BLD: 7 % (ref 5–13)
NEUTS SEG # BLD: 5.4 K/UL (ref 1.8–8)
NEUTS SEG NFR BLD: 67 % (ref 32–75)
NRBC # BLD: 0 K/UL (ref 0–0.01)
NRBC BLD-RTO: 0 PER 100 WBC
PLATELET # BLD AUTO: 238 K/UL (ref 150–400)
PMV BLD AUTO: 10.7 FL (ref 8.9–12.9)
POTASSIUM SERPL-SCNC: 3.8 MMOL/L (ref 3.5–5.1)
PROT SERPL-MCNC: 7.5 G/DL (ref 6.4–8.2)
RBC # BLD AUTO: 4.75 M/UL (ref 4.1–5.7)
SODIUM SERPL-SCNC: 142 MMOL/L (ref 136–145)
TRIGL SERPL-MCNC: 55 MG/DL
VLDLC SERPL CALC-MCNC: 11 MG/DL
WBC # BLD AUTO: 8 K/UL (ref 4.1–11.1)

## 2024-01-04 ENCOUNTER — TELEPHONE (OUTPATIENT)
Age: 79
End: 2024-01-04

## 2024-01-22 RX ORDER — EMPAGLIFLOZIN 25 MG/1
25 TABLET, FILM COATED ORAL DAILY
Qty: 90 TABLET | Refills: 3 | Status: SHIPPED | OUTPATIENT
Start: 2024-01-22

## 2024-03-20 RX ORDER — SEMAGLUTIDE 1.34 MG/ML
INJECTION, SOLUTION SUBCUTANEOUS
Qty: 36 ML | Refills: 2 | Status: SHIPPED | OUTPATIENT
Start: 2024-03-20

## 2024-04-11 ENCOUNTER — OFFICE VISIT (OUTPATIENT)
Age: 79
End: 2024-04-11
Payer: MEDICARE

## 2024-04-11 VITALS
DIASTOLIC BLOOD PRESSURE: 68 MMHG | TEMPERATURE: 97.5 F | SYSTOLIC BLOOD PRESSURE: 122 MMHG | OXYGEN SATURATION: 98 % | BODY MASS INDEX: 34.84 KG/M2 | WEIGHT: 222 LBS | HEART RATE: 70 BPM | RESPIRATION RATE: 16 BRPM | HEIGHT: 67 IN

## 2024-04-11 DIAGNOSIS — Z79.4 TYPE 2 DIABETES MELLITUS WITH DIABETIC NEPHROPATHY, WITH LONG-TERM CURRENT USE OF INSULIN (HCC): ICD-10-CM

## 2024-04-11 DIAGNOSIS — N18.30 STAGE 3 CHRONIC KIDNEY DISEASE, UNSPECIFIED WHETHER STAGE 3A OR 3B CKD (HCC): ICD-10-CM

## 2024-04-11 DIAGNOSIS — I10 ESSENTIAL (PRIMARY) HYPERTENSION: ICD-10-CM

## 2024-04-11 DIAGNOSIS — Z00.00 MEDICARE ANNUAL WELLNESS VISIT, SUBSEQUENT: Primary | ICD-10-CM

## 2024-04-11 DIAGNOSIS — E11.21 TYPE 2 DIABETES MELLITUS WITH DIABETIC NEPHROPATHY, WITH LONG-TERM CURRENT USE OF INSULIN (HCC): ICD-10-CM

## 2024-04-11 LAB
ALBUMIN SERPL-MCNC: 3.9 G/DL (ref 3.5–5)
ALBUMIN/GLOB SERPL: 1 (ref 1.1–2.2)
ALP SERPL-CCNC: 72 U/L (ref 45–117)
ALT SERPL-CCNC: 22 U/L (ref 12–78)
ANION GAP SERPL CALC-SCNC: 3 MMOL/L (ref 5–15)
AST SERPL-CCNC: 18 U/L (ref 15–37)
BASOPHILS # BLD: 0.1 K/UL (ref 0–0.1)
BASOPHILS NFR BLD: 1 % (ref 0–1)
BILIRUB SERPL-MCNC: 1.2 MG/DL (ref 0.2–1)
BUN SERPL-MCNC: 19 MG/DL (ref 6–20)
BUN/CREAT SERPL: 13 (ref 12–20)
CALCIUM SERPL-MCNC: 9.7 MG/DL (ref 8.5–10.1)
CHLORIDE SERPL-SCNC: 111 MMOL/L (ref 97–108)
CHOLEST SERPL-MCNC: 163 MG/DL
CO2 SERPL-SCNC: 29 MMOL/L (ref 21–32)
CREAT SERPL-MCNC: 1.5 MG/DL (ref 0.7–1.3)
CREAT UR-MCNC: 81.9 MG/DL
DIFFERENTIAL METHOD BLD: ABNORMAL
EOSINOPHIL # BLD: 0.1 K/UL (ref 0–0.4)
EOSINOPHIL NFR BLD: 2 % (ref 0–7)
ERYTHROCYTE [DISTWIDTH] IN BLOOD BY AUTOMATED COUNT: 15.3 % (ref 11.5–14.5)
EST. AVERAGE GLUCOSE BLD GHB EST-MCNC: 146 MG/DL
GLOBULIN SER CALC-MCNC: 4 G/DL (ref 2–4)
GLUCOSE SERPL-MCNC: 130 MG/DL (ref 65–100)
HBA1C MFR BLD: 6.7 % (ref 4–5.6)
HCT VFR BLD AUTO: 42.1 % (ref 36.6–50.3)
HDLC SERPL-MCNC: 71 MG/DL
HDLC SERPL: 2.3 (ref 0–5)
HGB BLD-MCNC: 13.4 G/DL (ref 12.1–17)
IMM GRANULOCYTES # BLD AUTO: 0 K/UL (ref 0–0.04)
IMM GRANULOCYTES NFR BLD AUTO: 0 % (ref 0–0.5)
LDLC SERPL CALC-MCNC: 80.4 MG/DL (ref 0–100)
LYMPHOCYTES # BLD: 2.2 K/UL (ref 0.8–3.5)
LYMPHOCYTES NFR BLD: 30 % (ref 12–49)
MCH RBC QN AUTO: 28.9 PG (ref 26–34)
MCHC RBC AUTO-ENTMCNC: 31.8 G/DL (ref 30–36.5)
MCV RBC AUTO: 90.9 FL (ref 80–99)
MICROALBUMIN UR-MCNC: 5.86 MG/DL
MICROALBUMIN/CREAT UR-RTO: 72 MG/G (ref 0–30)
MONOCYTES # BLD: 0.6 K/UL (ref 0–1)
MONOCYTES NFR BLD: 8 % (ref 5–13)
NEUTS SEG # BLD: 4.4 K/UL (ref 1.8–8)
NEUTS SEG NFR BLD: 59 % (ref 32–75)
NRBC # BLD: 0 K/UL (ref 0–0.01)
NRBC BLD-RTO: 0 PER 100 WBC
PLATELET # BLD AUTO: 228 K/UL (ref 150–400)
PMV BLD AUTO: 11.3 FL (ref 8.9–12.9)
POTASSIUM SERPL-SCNC: 4.1 MMOL/L (ref 3.5–5.1)
PROT SERPL-MCNC: 7.9 G/DL (ref 6.4–8.2)
RBC # BLD AUTO: 4.63 M/UL (ref 4.1–5.7)
SODIUM SERPL-SCNC: 143 MMOL/L (ref 136–145)
TRIGL SERPL-MCNC: 58 MG/DL
VLDLC SERPL CALC-MCNC: 11.6 MG/DL
WBC # BLD AUTO: 7.3 K/UL (ref 4.1–11.1)

## 2024-04-11 PROCEDURE — 99214 OFFICE O/P EST MOD 30 MIN: CPT | Performed by: INTERNAL MEDICINE

## 2024-04-11 ASSESSMENT — PATIENT HEALTH QUESTIONNAIRE - PHQ9
2. FEELING DOWN, DEPRESSED OR HOPELESS: NOT AT ALL
1. LITTLE INTEREST OR PLEASURE IN DOING THINGS: NOT AT ALL
SUM OF ALL RESPONSES TO PHQ QUESTIONS 1-9: 0
SUM OF ALL RESPONSES TO PHQ9 QUESTIONS 1 & 2: 0

## 2024-04-11 NOTE — PATIENT INSTRUCTIONS
kidney function test, a foot exam, and stricter control over your cholesterol.  -Cardiovascular screening for adults with routine risk involves an electrocardiogram (ECG) at intervals determined by the  provider.  -Colorectal cancer screening should be done for adults age 50-75 with no increased risk factors for colorectal cancer.  There are a number of acceptable methods of screening for this type of cancer. Each test has its own benefits and  drawbacks. Discuss with your provider what is most appropriate for you during your annual wellness visit. The different  tests include: colonoscopy (considered the best screening method), a fecal occult blood test, a fecal DNA test, and  sigmoidoscopy.  -All adults born between 1945 and 1965 should be screened once for Hepatitis C.  -An Abdominal Aortic Aneurysm (AAA) Screening is recommended for men age 65-75 who has ever smoked in their  lifetime.

## 2024-04-11 NOTE — PROGRESS NOTES
SUBJECTIVE:   Mr. Moe Guerra is a 78 y.o. male who is here for follow up of routine medical issues.  Previously he saw Dr. Dandre Waters.     Chief Complaint   Patient presents with    Follow-up    Other     Pt stated insurance has a question about ozempic   Problems with knees      He has some knee pain that is worse. \"I was putting up a metal shed in the yard.\"   Ozempic is no longer covered by his insurance.   Gluc 90s-low 100s. 113 today. He has been diabetic since about 2005.  He no longer sees Dr. Moe. BE Magallanes, PharmD has been working with him. Last A1C down to 6.5.   His BP at home has been normal. Borderline here this morning.    Vertigo \"doing fine\": He has been to see Dr. Nagel in the past.  Now seeing Dr. Lerma for retinal screening.    Chronic knee pain L > R. He has had recent steroid injection. He is currently seeing Dr. Guzmán (Previously he saw Dr. Murguia). As noted 2018: \"he gave me a cortisone shot and said it was bone-on-bone there.\"    At this time, he is otherwise doing well and has brought no other complaints to my attention today.  For a list of the medical issues addressed today, see the assessment and plan below.    PMH:   Past Medical History:   Diagnosis Date    Arthritis     knees, feet, hands    COVID-19 10/2021    Diabetes mellitus (HCC)     Does use hearing aid     Hearing loss     Has seen Dr. Francois    Herpes zoster without complication 2021    Initially in February, recurrence in L arm in October    Hypertension     Obesity     Vertigo     BPPV; Has seen ENT Dr. Francois, then Dr. Nagel       Past Surgical History:   Procedure Laterality Date    CATARACT REMOVAL       2009    COLONOSCOPY N/A 7/8/2019    COLONOSCOPY performed by Roger Mcdowell MD at [facility] ENDOSCOPY    COLONOSCOPY FLX DX W/COLLJ SPEC WHEN PFRMD  5/15/2014    Dr. Mcdowell    COLONOSCOPY,DIAGNOSTIC  7/8/2019         ROTATOR CUFF REPAIR Left 12/13/2013    shoulder    TONSILLECTOMY  1950    as a child       All: He is allergic to 
\"Have you been to the ER, urgent care clinic since your last visit?  Hospitalized since your last visit?\"    NO    “Have you seen or consulted any other health care providers outside of [facility] since your last visit?”    NO          Click Here for Release of Records Request  
of Onset    Heart Disease Paternal Grandmother     Other Maternal Grandfather         aneurysm    Cancer Sister         half sister-breast CA in remission    Other Maternal Grandmother          as result of complications of childbirth when baby was 6 mos old    Cancer Father 45        prostate    Hypertension Mother     Cancer Mother         lung       Social History     Socioeconomic History    Marital status:      Spouse name: Not on file    Number of children: Not on file    Years of education: Not on file    Highest education level: Not on file   Occupational History    Not on file   Tobacco Use    Smoking status: Former     Current packs/day: 0.00     Types: Cigarettes     Quit date: 1970     Years since quittin.3    Smokeless tobacco: Never   Substance and Sexual Activity    Alcohol use: No    Drug use: No    Sexual activity: Not on file   Other Topics Concern    Not on file   Social History Narrative    Not on file     Social Determinants of Health     Financial Resource Strain: Low Risk  (2023)    Overall Financial Resource Strain (CARDIA)     Difficulty of Paying Living Expenses: Not very hard   Food Insecurity: Not on file (2023)   Transportation Needs: Unknown (2023)    PRAPARE - Transportation     Lack of Transportation (Medical): Not on file     Lack of Transportation (Non-Medical): No   Physical Activity: Inactive (2023)    Exercise Vital Sign     Days of Exercise per Week: 0 days     Minutes of Exercise per Session: 0 min   Stress: Not on file   Social Connections: Not on file   Intimate Partner Violence: Not on file   Housing Stability: Unknown (2023)    Housing Stability Vital Sign     Unable to Pay for Housing in the Last Year: Not on file     Number of Places Lived in the Last Year: Not on file     Unstable Housing in the Last Year: No       Roland Costello MD

## 2024-04-22 RX ORDER — AMLODIPINE BESYLATE 10 MG/1
TABLET ORAL
Qty: 90 TABLET | Refills: 3 | Status: SHIPPED | OUTPATIENT
Start: 2024-04-22

## 2024-06-07 RX ORDER — AMLODIPINE BESYLATE 10 MG/1
10 TABLET ORAL DAILY
Qty: 90 TABLET | Refills: 3 | Status: SHIPPED | OUTPATIENT
Start: 2024-06-07

## 2024-06-11 NOTE — TELEPHONE ENCOUNTER
PCP: Roland Costello MD    Last appt: 4/11/2024  Future Appointments   Date Time Provider Department Center   8/23/2024  9:15 AM Roland Costello MD [facility] BS AMB       Requested Prescriptions     Pending Prescriptions Disp Refills    losartan (COZAAR) 50 MG tablet [Pharmacy Med Name: LOSARTAN TABS 50MG] 90 tablet 7     Sig: TAKE 1 TABLET IN THE MORNING AND AT BEDTIME

## 2024-06-12 RX ORDER — LOSARTAN POTASSIUM 50 MG/1
TABLET ORAL
Qty: 90 TABLET | Refills: 7 | Status: SHIPPED | OUTPATIENT
Start: 2024-06-12

## 2024-08-23 ENCOUNTER — OFFICE VISIT (OUTPATIENT)
Age: 79
End: 2024-08-23
Payer: MEDICARE

## 2024-08-23 VITALS
BODY MASS INDEX: 35.69 KG/M2 | DIASTOLIC BLOOD PRESSURE: 78 MMHG | SYSTOLIC BLOOD PRESSURE: 125 MMHG | WEIGHT: 227.4 LBS | HEART RATE: 79 BPM | TEMPERATURE: 97.3 F | OXYGEN SATURATION: 98 % | HEIGHT: 67 IN | RESPIRATION RATE: 18 BRPM

## 2024-08-23 DIAGNOSIS — E11.21 TYPE 2 DIABETES MELLITUS WITH DIABETIC NEPHROPATHY, WITH LONG-TERM CURRENT USE OF INSULIN (HCC): Primary | ICD-10-CM

## 2024-08-23 DIAGNOSIS — Z79.4 TYPE 2 DIABETES MELLITUS WITH DIABETIC NEPHROPATHY, WITH LONG-TERM CURRENT USE OF INSULIN (HCC): Primary | ICD-10-CM

## 2024-08-23 DIAGNOSIS — N18.30 STAGE 3 CHRONIC KIDNEY DISEASE, UNSPECIFIED WHETHER STAGE 3A OR 3B CKD (HCC): ICD-10-CM

## 2024-08-23 DIAGNOSIS — I10 ESSENTIAL (PRIMARY) HYPERTENSION: ICD-10-CM

## 2024-08-23 DIAGNOSIS — E66.01 SEVERE OBESITY (BMI 35.0-39.9) WITH COMORBIDITY (HCC): ICD-10-CM

## 2024-08-23 LAB
ALBUMIN SERPL-MCNC: 3.8 G/DL (ref 3.5–5)
ALBUMIN/GLOB SERPL: 0.9 (ref 1.1–2.2)
ALP SERPL-CCNC: 74 U/L (ref 45–117)
ALT SERPL-CCNC: 22 U/L (ref 12–78)
ANION GAP SERPL CALC-SCNC: 3 MMOL/L (ref 5–15)
AST SERPL-CCNC: 18 U/L (ref 15–37)
BASOPHILS # BLD: 0 K/UL (ref 0–0.1)
BASOPHILS NFR BLD: 1 % (ref 0–1)
BILIRUB SERPL-MCNC: 0.8 MG/DL (ref 0.2–1)
BUN SERPL-MCNC: 40 MG/DL (ref 6–20)
BUN/CREAT SERPL: 22 (ref 12–20)
CALCIUM SERPL-MCNC: 9 MG/DL (ref 8.5–10.1)
CHLORIDE SERPL-SCNC: 109 MMOL/L (ref 97–108)
CHOLEST SERPL-MCNC: 143 MG/DL
CO2 SERPL-SCNC: 29 MMOL/L (ref 21–32)
CREAT SERPL-MCNC: 1.82 MG/DL (ref 0.7–1.3)
DIFFERENTIAL METHOD BLD: ABNORMAL
EOSINOPHIL # BLD: 0.1 K/UL (ref 0–0.4)
EOSINOPHIL NFR BLD: 1 % (ref 0–7)
ERYTHROCYTE [DISTWIDTH] IN BLOOD BY AUTOMATED COUNT: 15.2 % (ref 11.5–14.5)
EST. AVERAGE GLUCOSE BLD GHB EST-MCNC: 154 MG/DL
GLOBULIN SER CALC-MCNC: 4.2 G/DL (ref 2–4)
GLUCOSE SERPL-MCNC: 132 MG/DL (ref 65–100)
HBA1C MFR BLD: 7 % (ref 4–5.6)
HCT VFR BLD AUTO: 42.6 % (ref 36.6–50.3)
HDLC SERPL-MCNC: 60 MG/DL
HDLC SERPL: 2.4 (ref 0–5)
HGB BLD-MCNC: 13.6 G/DL (ref 12.1–17)
IMM GRANULOCYTES # BLD AUTO: 0 K/UL (ref 0–0.04)
IMM GRANULOCYTES NFR BLD AUTO: 0 % (ref 0–0.5)
LDLC SERPL CALC-MCNC: 69.8 MG/DL (ref 0–100)
LYMPHOCYTES # BLD: 2 K/UL (ref 0.8–3.5)
LYMPHOCYTES NFR BLD: 27 % (ref 12–49)
MCH RBC QN AUTO: 28.9 PG (ref 26–34)
MCHC RBC AUTO-ENTMCNC: 31.9 G/DL (ref 30–36.5)
MCV RBC AUTO: 90.4 FL (ref 80–99)
MONOCYTES # BLD: 0.7 K/UL (ref 0–1)
MONOCYTES NFR BLD: 10 % (ref 5–13)
NEUTS SEG # BLD: 4.6 K/UL (ref 1.8–8)
NEUTS SEG NFR BLD: 61 % (ref 32–75)
NRBC # BLD: 0 K/UL (ref 0–0.01)
NRBC BLD-RTO: 0 PER 100 WBC
PLATELET # BLD AUTO: 204 K/UL (ref 150–400)
PMV BLD AUTO: 11.4 FL (ref 8.9–12.9)
POTASSIUM SERPL-SCNC: 4.4 MMOL/L (ref 3.5–5.1)
PROT SERPL-MCNC: 8 G/DL (ref 6.4–8.2)
RBC # BLD AUTO: 4.71 M/UL (ref 4.1–5.7)
SODIUM SERPL-SCNC: 141 MMOL/L (ref 136–145)
TRIGL SERPL-MCNC: 66 MG/DL
VLDLC SERPL CALC-MCNC: 13.2 MG/DL
WBC # BLD AUTO: 7.5 K/UL (ref 4.1–11.1)

## 2024-08-23 PROCEDURE — 99214 OFFICE O/P EST MOD 30 MIN: CPT | Performed by: INTERNAL MEDICINE

## 2024-08-23 PROCEDURE — 3044F HG A1C LEVEL LT 7.0%: CPT | Performed by: INTERNAL MEDICINE

## 2024-08-23 PROCEDURE — 3074F SYST BP LT 130 MM HG: CPT | Performed by: INTERNAL MEDICINE

## 2024-08-23 PROCEDURE — 3078F DIAST BP <80 MM HG: CPT | Performed by: INTERNAL MEDICINE

## 2024-08-23 PROCEDURE — 1123F ACP DISCUSS/DSCN MKR DOCD: CPT | Performed by: INTERNAL MEDICINE

## 2024-08-23 SDOH — ECONOMIC STABILITY: FOOD INSECURITY: WITHIN THE PAST 12 MONTHS, THE FOOD YOU BOUGHT JUST DIDN'T LAST AND YOU DIDN'T HAVE MONEY TO GET MORE.: NEVER TRUE

## 2024-08-23 SDOH — ECONOMIC STABILITY: FOOD INSECURITY: WITHIN THE PAST 12 MONTHS, YOU WORRIED THAT YOUR FOOD WOULD RUN OUT BEFORE YOU GOT MONEY TO BUY MORE.: NEVER TRUE

## 2024-08-23 SDOH — ECONOMIC STABILITY: INCOME INSECURITY: HOW HARD IS IT FOR YOU TO PAY FOR THE VERY BASICS LIKE FOOD, HOUSING, MEDICAL CARE, AND HEATING?: NOT HARD AT ALL

## 2024-08-23 ASSESSMENT — PATIENT HEALTH QUESTIONNAIRE - PHQ9
SUM OF ALL RESPONSES TO PHQ QUESTIONS 1-9: 0
1. LITTLE INTEREST OR PLEASURE IN DOING THINGS: NOT AT ALL
SUM OF ALL RESPONSES TO PHQ QUESTIONS 1-9: 0
SUM OF ALL RESPONSES TO PHQ9 QUESTIONS 1 & 2: 0
2. FEELING DOWN, DEPRESSED OR HOPELESS: NOT AT ALL

## 2024-08-23 ASSESSMENT — LIFESTYLE VARIABLES
HOW MANY STANDARD DRINKS CONTAINING ALCOHOL DO YOU HAVE ON A TYPICAL DAY: PATIENT DOES NOT DRINK
HOW OFTEN DO YOU HAVE A DRINK CONTAINING ALCOHOL: NEVER

## 2024-08-23 NOTE — PROGRESS NOTES
\"Have you been to the ER, urgent care clinic since your last visit?  Hospitalized since your last visit?\"    NO    “Have you seen or consulted any other health care providers outside of [facility] since your last visit?”    Eye Doctor  Podiatrist          Click Here for Release of Records Request

## 2024-08-23 NOTE — PROGRESS NOTES
SUBJECTIVE:   Mr. Moe Guerra is a 79 y.o. male who is here for follow up of routine medical issues.  Previously he saw Dr. Dandre Waters.  (He is not due yet for AWV as it turns out).     Chief Complaint   Patient presents with    Medicare AWV     Gluc 90s-low 100s. 113 today. He has been diabetic since about 2005.  He no longer sees Dr. Moe. BE Magallanes, PharmD was working with him in 2023. Last A1C down to 6.7.   His BP at home has been normal. Borderline here this morning.    Vertigo \"doing fine\": He has been to see Dr. Nagel in the past.  Now seeing Dr. Lerma for retinal screening.    Chronic knee pain L > R. He has had recent steroid injection. He is currently seeing Dr. Guzmán (Previously he saw Dr. Murguia). As noted 2018: \"he gave me a cortisone shot and said it was bone-on-bone there.\"    At this time, he is otherwise doing well and has brought no other complaints to my attention today.  For a list of the medical issues addressed today, see the assessment and plan below.    PMH:   Past Medical History:   Diagnosis Date    Arthritis     knees, feet, hands    COVID-19 10/2021    Diabetes mellitus (HCC)     Does use hearing aid     Hearing loss     Has seen Dr. Francois    Herpes zoster without complication 2021    Initially in February, recurrence in L arm in October    Hypertension     Obesity     Vertigo     BPPV; Has seen ENT Dr. Francois, then Dr. Nagel       Past Surgical History:   Procedure Laterality Date    CATARACT REMOVAL       2009    COLONOSCOPY N/A 7/8/2019    COLONOSCOPY performed by Roger [facility]owell MD at [facility] ENDOSCOPY    COLONOSCOPY FLX DX W/COLLJ SPEC WHEN PFRMD  5/15/2014    Dr. Mcdowell    COLONOSCOPY,DIAGNOSTIC  7/8/2019         ROTATOR CUFF REPAIR Left 12/13/2013    shoulder    TONSILLECTOMY  1950    as a child       All: He is allergic to ace inhibitors.    Current Outpatient Medications on File Prior to Visit   Medication Sig Dispense Refill    losartan (COZAAR) 50 MG tablet TAKE 1 TABLET IN THE

## 2024-08-23 NOTE — PROGRESS NOTES
Medicare Annual Wellness Visit    Moe Guerra is here for Medicare AWV    Assessment & Plan   Medicare annual wellness visit, subsequent  Recommendations for Preventive Services Due: see orders and patient instructions/AVS.  Recommended screening schedule for the next 5-10 years is provided to the patient in written form: see Patient Instructions/AVS.     No follow-ups on file.     Subjective       Patient's complete Health Risk Assessment and screening values have been reviewed and are found in Flowsheets. The following problems were reviewed today and where indicated follow up appointments were made and/or referrals ordered.    Positive Risk Factor Screenings with Interventions:    Fall Risk:  Do you feel unsteady or are you worried about falling? : no  2 or more falls in past year?: (!) yes  Fall with injury in past year?: no     Interventions:    Reviewed medications, home hazards, visual acuity, and co-morbidities that can increase risk for falls            General HRA Questions:  Select all that apply: (!) New or Increased Pain  Interventions - Pain:  See AVS for additional education material       Poor Eating Habits/Diet:  Do you eat balanced/healthy meals regularly?: (!) No  Interventions:  See AVS for additional education material    Abnormal BMI (obese):  Body mass index is 35.62 kg/m². (!) Abnormal  Interventions:  See AVS for additional education material                           Objective   Vitals:    08/23/24 0926   BP: 125/78   Site: Left Upper Arm   Position: Sitting   Cuff Size: Large Adult   Pulse: 79   Resp: 18   Temp: 97.3 °F (36.3 °C)   TempSrc: Temporal   SpO2: 98%   Weight: 103.1 kg (227 lb 6.4 oz)   Height: 1.702 m (5' 7\")      Body mass index is 35.62 kg/m².                    Allergies   Allergen Reactions    Ace Inhibitors Cough     Prior to Visit Medications    Medication Sig Taking? Authorizing Provider   losartan (COZAAR) 50 MG tablet TAKE 1 TABLET IN THE MORNING AND AT BEDTIME Yes

## 2024-09-05 RX ORDER — SILDENAFIL 50 MG/1
50 TABLET, FILM COATED ORAL PRN
Qty: 30 TABLET | Refills: 0 | Status: SHIPPED | OUTPATIENT
Start: 2024-09-05

## 2024-09-05 NOTE — TELEPHONE ENCOUNTER
PCP: Roland Costello MD    Last appt:   8/23/2024    Future Appointments   Date Time Provider Department Center   12/23/2024  9:15 AM Roland Costello MD [facility] DEP       Requested Prescriptions     Pending Prescriptions Disp Refills    sildenafil (VIAGRA) 50 MG tablet 30 tablet 0     Sig: Take 1 tablet by mouth as needed for Erectile Dysfunction

## 2024-12-23 ENCOUNTER — OFFICE VISIT (OUTPATIENT)
Age: 79
End: 2024-12-23
Payer: MEDICARE

## 2024-12-23 VITALS
HEART RATE: 82 BPM | TEMPERATURE: 97.4 F | RESPIRATION RATE: 18 BRPM | BODY MASS INDEX: 36.73 KG/M2 | DIASTOLIC BLOOD PRESSURE: 77 MMHG | OXYGEN SATURATION: 97 % | WEIGHT: 234 LBS | HEIGHT: 67 IN | SYSTOLIC BLOOD PRESSURE: 144 MMHG

## 2024-12-23 DIAGNOSIS — Z79.4 TYPE 2 DIABETES MELLITUS WITH DIABETIC NEPHROPATHY, WITH LONG-TERM CURRENT USE OF INSULIN (HCC): Primary | ICD-10-CM

## 2024-12-23 DIAGNOSIS — I10 ESSENTIAL (PRIMARY) HYPERTENSION: ICD-10-CM

## 2024-12-23 DIAGNOSIS — E11.21 TYPE 2 DIABETES MELLITUS WITH DIABETIC NEPHROPATHY, WITH LONG-TERM CURRENT USE OF INSULIN (HCC): Primary | ICD-10-CM

## 2024-12-23 DIAGNOSIS — Z23 NEEDS FLU SHOT: ICD-10-CM

## 2024-12-23 DIAGNOSIS — N18.30 STAGE 3 CHRONIC KIDNEY DISEASE, UNSPECIFIED WHETHER STAGE 3A OR 3B CKD (HCC): ICD-10-CM

## 2024-12-23 LAB
ALBUMIN SERPL-MCNC: 3.8 G/DL (ref 3.5–5)
ALBUMIN/GLOB SERPL: 1 (ref 1.1–2.2)
ALP SERPL-CCNC: 76 U/L (ref 45–117)
ALT SERPL-CCNC: 26 U/L (ref 12–78)
ANION GAP SERPL CALC-SCNC: 2 MMOL/L (ref 2–12)
AST SERPL-CCNC: 19 U/L (ref 15–37)
BASOPHILS # BLD: 0 K/UL (ref 0–0.1)
BASOPHILS NFR BLD: 1 % (ref 0–1)
BILIRUB SERPL-MCNC: 0.9 MG/DL (ref 0.2–1)
BUN SERPL-MCNC: 25 MG/DL (ref 6–20)
BUN/CREAT SERPL: 16 (ref 12–20)
CALCIUM SERPL-MCNC: 8.8 MG/DL (ref 8.5–10.1)
CHLORIDE SERPL-SCNC: 112 MMOL/L (ref 97–108)
CHOLEST SERPL-MCNC: 150 MG/DL
CO2 SERPL-SCNC: 28 MMOL/L (ref 21–32)
CREAT SERPL-MCNC: 1.6 MG/DL (ref 0.7–1.3)
CREAT UR-MCNC: 97.2 MG/DL
DIFFERENTIAL METHOD BLD: ABNORMAL
EOSINOPHIL # BLD: 0.1 K/UL (ref 0–0.4)
EOSINOPHIL NFR BLD: 1 % (ref 0–7)
ERYTHROCYTE [DISTWIDTH] IN BLOOD BY AUTOMATED COUNT: 14.8 % (ref 11.5–14.5)
EST. AVERAGE GLUCOSE BLD GHB EST-MCNC: 163 MG/DL
GLOBULIN SER CALC-MCNC: 4 G/DL (ref 2–4)
GLUCOSE SERPL-MCNC: 138 MG/DL (ref 65–100)
HBA1C MFR BLD: 7.3 % (ref 4–5.6)
HCT VFR BLD AUTO: 41.8 % (ref 36.6–50.3)
HDLC SERPL-MCNC: 62 MG/DL
HDLC SERPL: 2.4 (ref 0–5)
HGB BLD-MCNC: 13.5 G/DL (ref 12.1–17)
IMM GRANULOCYTES # BLD AUTO: 0 K/UL (ref 0–0.04)
IMM GRANULOCYTES NFR BLD AUTO: 0 % (ref 0–0.5)
LDLC SERPL CALC-MCNC: 76.4 MG/DL (ref 0–100)
LYMPHOCYTES # BLD: 1.9 K/UL (ref 0.8–3.5)
LYMPHOCYTES NFR BLD: 25 % (ref 12–49)
MCH RBC QN AUTO: 29.3 PG (ref 26–34)
MCHC RBC AUTO-ENTMCNC: 32.3 G/DL (ref 30–36.5)
MCV RBC AUTO: 90.9 FL (ref 80–99)
MICROALBUMIN UR-MCNC: 5.3 MG/DL
MICROALBUMIN/CREAT UR-RTO: 55 MG/G (ref 0–30)
MONOCYTES # BLD: 0.6 K/UL (ref 0–1)
MONOCYTES NFR BLD: 9 % (ref 5–13)
NEUTS SEG # BLD: 4.7 K/UL (ref 1.8–8)
NEUTS SEG NFR BLD: 64 % (ref 32–75)
NRBC # BLD: 0 K/UL (ref 0–0.01)
NRBC BLD-RTO: 0 PER 100 WBC
PLATELET # BLD AUTO: 205 K/UL (ref 150–400)
PMV BLD AUTO: 11 FL (ref 8.9–12.9)
POTASSIUM SERPL-SCNC: 4.5 MMOL/L (ref 3.5–5.1)
PROT SERPL-MCNC: 7.8 G/DL (ref 6.4–8.2)
RBC # BLD AUTO: 4.6 M/UL (ref 4.1–5.7)
SODIUM SERPL-SCNC: 142 MMOL/L (ref 136–145)
TRIGL SERPL-MCNC: 58 MG/DL
VLDLC SERPL CALC-MCNC: 11.6 MG/DL
WBC # BLD AUTO: 7.4 K/UL (ref 4.1–11.1)

## 2024-12-23 PROCEDURE — 1159F MED LIST DOCD IN RCRD: CPT | Performed by: INTERNAL MEDICINE

## 2024-12-23 PROCEDURE — 3078F DIAST BP <80 MM HG: CPT | Performed by: INTERNAL MEDICINE

## 2024-12-23 PROCEDURE — 90653 IIV ADJUVANT VACCINE IM: CPT | Performed by: INTERNAL MEDICINE

## 2024-12-23 PROCEDURE — G8482 FLU IMMUNIZE ORDER/ADMIN: HCPCS | Performed by: INTERNAL MEDICINE

## 2024-12-23 PROCEDURE — 1036F TOBACCO NON-USER: CPT | Performed by: INTERNAL MEDICINE

## 2024-12-23 PROCEDURE — 99214 OFFICE O/P EST MOD 30 MIN: CPT | Performed by: INTERNAL MEDICINE

## 2024-12-23 PROCEDURE — 3077F SYST BP >= 140 MM HG: CPT | Performed by: INTERNAL MEDICINE

## 2024-12-23 PROCEDURE — 1123F ACP DISCUSS/DSCN MKR DOCD: CPT | Performed by: INTERNAL MEDICINE

## 2024-12-23 PROCEDURE — G8427 DOCREV CUR MEDS BY ELIG CLIN: HCPCS | Performed by: INTERNAL MEDICINE

## 2024-12-23 PROCEDURE — 3051F HG A1C>EQUAL 7.0%<8.0%: CPT | Performed by: INTERNAL MEDICINE

## 2024-12-23 PROCEDURE — G8417 CALC BMI ABV UP PARAM F/U: HCPCS | Performed by: INTERNAL MEDICINE

## 2024-12-23 PROCEDURE — PBSHW INFLUENZA, FLUAD TRIVALENT, (AGE 65 Y+), IM, PRESERVATIVE FREE, 0.5ML: Performed by: INTERNAL MEDICINE

## 2024-12-23 ASSESSMENT — PATIENT HEALTH QUESTIONNAIRE - PHQ9
SUM OF ALL RESPONSES TO PHQ QUESTIONS 1-9: 0
1. LITTLE INTEREST OR PLEASURE IN DOING THINGS: NOT AT ALL
2. FEELING DOWN, DEPRESSED OR HOPELESS: NOT AT ALL
SUM OF ALL RESPONSES TO PHQ QUESTIONS 1-9: 0
SUM OF ALL RESPONSES TO PHQ9 QUESTIONS 1 & 2: 0

## 2024-12-23 NOTE — PROGRESS NOTES
\"Have you been to the ER, urgent care clinic since your last visit?  Hospitalized since your last visit?\"    NO    “Have you seen or consulted any other health care providers outside our system since your last visit?”    NO    “Have you had a diabetic eye exam?”        Date of last diabetic eye exam: 8/24/2023

## 2024-12-23 NOTE — PROGRESS NOTES
SUBJECTIVE:   Mr. Moe Guerra is a 79 y.o. male who is here for follow up of routine medical issues.  Previously he saw Dr. Dandre Waters.     Chief Complaint   Patient presents with    Follow-up     Gluc generally has been in 90s-low 100s. He has been diabetic since about 2005.  He no longer sees Dr. Moe. BE Magallanes, PharmD was working with him in 2023. Last A1C:  Hemoglobin A1C   Date Value Ref Range Status   08/23/2024 7.0 (H) 4.0 - 5.6 % Final     Comment:     (NOTE)  HbA1C Interpretive Ranges  <5.7              Normal  5.7 - 6.4         Consider Prediabetes  >6.5              Consider Diabetes          His BP at home has been normal. It was a little high here this morning.    Vertigo \"doing fine\": He has been to see Dr. Nagel, ENT, in the past.  Now seeing Dr. Lerma for retinal screening.    Chronic knee pain L > R. He has had recent steroid injection. He is currently seeing Dr. Guzmán (Previously he saw Dr. Murguia). As noted 2018: \"he gave me a cortisone shot and said it was bone-on-bone there.\"    At this time, he is otherwise doing well and has brought no other complaints to my attention today.  For a list of the medical issues addressed today, see the assessment and plan below.    PMH:   Past Medical History:   Diagnosis Date    Arthritis     knees, feet, hands    COVID-19 10/2021    Diabetes mellitus (HCC)     Does use hearing aid     Hearing loss     Has seen Dr. Francois    Herpes zoster without complication 2021    Initially in February, recurrence in L arm in October    Hypertension     Obesity     Vertigo     BPPV; Has seen ENT Dr. Francois, then Dr. Nagel       Past Surgical History:   Procedure Laterality Date    CATARACT REMOVAL       2009    COLONOSCOPY N/A 7/8/2019    COLONOSCOPY performed by Roger Mcdowell MD at [facility] ENDOSCOPY    COLONOSCOPY FLX DX W/COLLJ SPEC WHEN PFRMD  5/15/2014    Dr. Mcdowell    COLONOSCOPY,DIAGNOSTIC  7/8/2019         ROTATOR CUFF REPAIR Left 12/13/2013    shoulder    TONSILLECTOMY

## 2025-02-25 RX ORDER — SEMAGLUTIDE 1.34 MG/ML
1 INJECTION, SOLUTION SUBCUTANEOUS
Qty: 36 ML | Refills: 2 | Status: SHIPPED | OUTPATIENT
Start: 2025-02-25 | End: 2025-02-27 | Stop reason: SDUPTHER

## 2025-02-25 NOTE — TELEPHONE ENCOUNTER
PCP: Roland Costello MD    Last appt:   12/23/2024    Future Appointments   Date Time Provider Department Center   4/23/2025 10:45 AM Roland Costello MD [facility] ECC DEP       Requested Prescriptions     Pending Prescriptions Disp Refills    Semaglutide, 1 MG/DOSE, (OZEMPIC, 1 MG/DOSE,) 4 MG/3ML SOPN sc injection 36 mL 2     Sig: Inject 1 mg into the skin every 7 days

## 2025-02-26 NOTE — TELEPHONE ENCOUNTER
Caller requests Refill of:    Semaglutide, 1 MG/DOSE, (OZEMPIC, 1 MG/DOSE,) 4 MG/3ML SOPN sc injection     RX was sent to incorrect pharmacy.     Please send to:  [facility] [place] - [address] - P [phone] - F [phone]          Visit / Appointment History:  Future Appointment at [facility]:  4/23/2025   Last Appointment With PCP:  12/23/2024       Caller confirmed instructions and dosages as correct.    Caller was advised that Meds will be refilled as soon as possible, however there can be a 48-72 business hour turn around on refill requests.            Ref: [id number]

## 2025-02-27 RX ORDER — SEMAGLUTIDE 1.34 MG/ML
1 INJECTION, SOLUTION SUBCUTANEOUS
Qty: 36 ML | Refills: 2 | Status: SHIPPED | OUTPATIENT
Start: 2025-02-27

## 2025-04-21 RX ORDER — AMLODIPINE BESYLATE 10 MG/1
10 TABLET ORAL DAILY
Qty: 90 TABLET | Refills: 3 | Status: SHIPPED | OUTPATIENT
Start: 2025-04-21

## 2025-04-23 ENCOUNTER — OFFICE VISIT (OUTPATIENT)
Age: 80
End: 2025-04-23
Payer: MEDICARE

## 2025-04-23 VITALS
OXYGEN SATURATION: 98 % | TEMPERATURE: 97.2 F | BODY MASS INDEX: 36.44 KG/M2 | HEART RATE: 67 BPM | WEIGHT: 232.2 LBS | HEIGHT: 67 IN | DIASTOLIC BLOOD PRESSURE: 74 MMHG | RESPIRATION RATE: 16 BRPM | SYSTOLIC BLOOD PRESSURE: 137 MMHG

## 2025-04-23 DIAGNOSIS — Z79.4 TYPE 2 DIABETES MELLITUS WITH DIABETIC NEPHROPATHY, WITH LONG-TERM CURRENT USE OF INSULIN (HCC): Primary | ICD-10-CM

## 2025-04-23 DIAGNOSIS — Z79.4 TYPE 2 DIABETES MELLITUS WITH DIABETIC NEPHROPATHY, WITH LONG-TERM CURRENT USE OF INSULIN (HCC): ICD-10-CM

## 2025-04-23 DIAGNOSIS — E11.21 TYPE 2 DIABETES MELLITUS WITH DIABETIC NEPHROPATHY, WITH LONG-TERM CURRENT USE OF INSULIN (HCC): ICD-10-CM

## 2025-04-23 DIAGNOSIS — E11.21 TYPE 2 DIABETES MELLITUS WITH DIABETIC NEPHROPATHY, WITH LONG-TERM CURRENT USE OF INSULIN (HCC): Primary | ICD-10-CM

## 2025-04-23 DIAGNOSIS — E66.01 MORBID (SEVERE) OBESITY DUE TO EXCESS CALORIES (HCC): ICD-10-CM

## 2025-04-23 DIAGNOSIS — E66.01 SEVERE OBESITY (BMI 35.0-39.9) WITH COMORBIDITY (HCC): ICD-10-CM

## 2025-04-23 DIAGNOSIS — I10 ESSENTIAL (PRIMARY) HYPERTENSION: ICD-10-CM

## 2025-04-23 DIAGNOSIS — N18.30 STAGE 3 CHRONIC KIDNEY DISEASE, UNSPECIFIED WHETHER STAGE 3A OR 3B CKD (HCC): ICD-10-CM

## 2025-04-23 PROCEDURE — 99214 OFFICE O/P EST MOD 30 MIN: CPT | Performed by: INTERNAL MEDICINE

## 2025-04-23 PROCEDURE — 1159F MED LIST DOCD IN RCRD: CPT | Performed by: INTERNAL MEDICINE

## 2025-04-23 PROCEDURE — 1123F ACP DISCUSS/DSCN MKR DOCD: CPT | Performed by: INTERNAL MEDICINE

## 2025-04-23 PROCEDURE — 3075F SYST BP GE 130 - 139MM HG: CPT | Performed by: INTERNAL MEDICINE

## 2025-04-23 PROCEDURE — G8417 CALC BMI ABV UP PARAM F/U: HCPCS | Performed by: INTERNAL MEDICINE

## 2025-04-23 PROCEDURE — G8427 DOCREV CUR MEDS BY ELIG CLIN: HCPCS | Performed by: INTERNAL MEDICINE

## 2025-04-23 PROCEDURE — 3078F DIAST BP <80 MM HG: CPT | Performed by: INTERNAL MEDICINE

## 2025-04-23 PROCEDURE — 1036F TOBACCO NON-USER: CPT | Performed by: INTERNAL MEDICINE

## 2025-04-23 RX ORDER — GLIPIZIDE 5 MG/1
5 TABLET ORAL
COMMUNITY
End: 2025-04-23

## 2025-04-23 RX ORDER — GLIPIZIDE 5 MG/1
5 TABLET ORAL
Qty: 180 TABLET | Refills: 3 | Status: SHIPPED | OUTPATIENT
Start: 2025-04-23

## 2025-04-23 SDOH — ECONOMIC STABILITY: FOOD INSECURITY: WITHIN THE PAST 12 MONTHS, YOU WORRIED THAT YOUR FOOD WOULD RUN OUT BEFORE YOU GOT MONEY TO BUY MORE.: NEVER TRUE

## 2025-04-23 SDOH — ECONOMIC STABILITY: FOOD INSECURITY: WITHIN THE PAST 12 MONTHS, THE FOOD YOU BOUGHT JUST DIDN'T LAST AND YOU DIDN'T HAVE MONEY TO GET MORE.: NEVER TRUE

## 2025-04-23 ASSESSMENT — PATIENT HEALTH QUESTIONNAIRE - PHQ9
2. FEELING DOWN, DEPRESSED OR HOPELESS: NOT AT ALL
SUM OF ALL RESPONSES TO PHQ QUESTIONS 1-9: 0
SUM OF ALL RESPONSES TO PHQ QUESTIONS 1-9: 0
1. LITTLE INTEREST OR PLEASURE IN DOING THINGS: NOT AT ALL
SUM OF ALL RESPONSES TO PHQ QUESTIONS 1-9: 0
SUM OF ALL RESPONSES TO PHQ QUESTIONS 1-9: 0

## 2025-04-23 NOTE — PROGRESS NOTES
SUBJECTIVE:   Mr. Moe Guerra is a 79 y.o. male who is here for follow up of routine medical issues.  Previously he saw Dr. Dandre Waters.     Chief Complaint   Patient presents with    Hypertension    Diabetes       He had a bout of vertigo for a week or so, which went away. He has had vertigo in the past. He has been to see Dr. Nagel, ENT.  Gluc generally has been in 90s-low 100s. He has been diabetic since about 2005.  He no longer sees Dr. Moe. BE Magallanes, PharmD was working with him in 2023. Last A1C:  Hemoglobin A1C   Date Value Ref Range Status   12/23/2024 7.3 (H) 4.0 - 5.6 % Final     Comment:     (NOTE)  HbA1C Interpretive Ranges  <5.7              Normal  5.7 - 6.4         Consider Prediabetes  >6.5              Consider Diabetes          His BP at home has been normal. It was a little high here this morning.    Now seeing Dr. Lerma for retinal screening.    Chronic knee pain L > R. He has had recent steroid injection. He is currently seeing Dr. Guzmán (Previously he saw Dr. Murguia). As noted 2018: \"he gave me a cortisone shot and said it was bone-on-bone there.\"    At this time, he is otherwise doing well and has brought no other complaints to my attention today.  For a list of the medical issues addressed today, see the assessment and plan below.    PMH:   Past Medical History:   Diagnosis Date    Arthritis     knees, feet, hands    COVID-19 10/2021    Diabetes mellitus (HCC)     Does use hearing aid     Hearing loss     Has seen Dr. Francois    Herpes zoster without complication 2021    Initially in February, recurrence in L arm in October    Hypertension     Obesity     Vertigo     BPPV; Has seen ENT Dr. Francois, then Dr. Nagel       Past Surgical History:   Procedure Laterality Date    CATARACT REMOVAL       2009    COLONOSCOPY N/A 7/8/2019    COLONOSCOPY performed by Roger Mcdowell MD at [facility] ENDOSCOPY    COLONOSCOPY FLX DX W/COLLJ SPEC WHEN PFRMD  5/15/2014    Dr. Mcdowell    COLONOSCOPY,DIAGNOSTIC  7/8/2019

## 2025-04-23 NOTE — PROGRESS NOTES
\"Have you been to the ER, urgent care clinic since your last visit?  Hospitalized since your last visit?\"    no    “Have you seen or consulted any other health care providers outside our system since your last visit?”    no    “Have you had a diabetic eye exam?”    01/2025  Date of last diabetic eye exam: 8/24/2023

## 2025-04-24 ENCOUNTER — RESULTS FOLLOW-UP (OUTPATIENT)
Age: 80
End: 2025-04-24

## 2025-04-24 LAB
ALBUMIN SERPL-MCNC: 3.7 G/DL (ref 3.5–5)
ALBUMIN/GLOB SERPL: 0.9 (ref 1.1–2.2)
ALP SERPL-CCNC: 77 U/L (ref 45–117)
ALT SERPL-CCNC: 28 U/L (ref 12–78)
ANION GAP SERPL CALC-SCNC: 4 MMOL/L (ref 2–12)
AST SERPL-CCNC: 19 U/L (ref 15–37)
BASOPHILS # BLD: 0.04 K/UL (ref 0–0.1)
BASOPHILS NFR BLD: 0.5 % (ref 0–1)
BILIRUB SERPL-MCNC: 1.2 MG/DL (ref 0.2–1)
BUN SERPL-MCNC: 23 MG/DL (ref 6–20)
BUN/CREAT SERPL: 16 (ref 12–20)
CALCIUM SERPL-MCNC: 9.5 MG/DL (ref 8.5–10.1)
CHLORIDE SERPL-SCNC: 106 MMOL/L (ref 97–108)
CHOLEST SERPL-MCNC: 163 MG/DL
CO2 SERPL-SCNC: 30 MMOL/L (ref 21–32)
CREAT SERPL-MCNC: 1.42 MG/DL (ref 0.7–1.3)
DIFFERENTIAL METHOD BLD: ABNORMAL
EOSINOPHIL # BLD: 0.05 K/UL (ref 0–0.4)
EOSINOPHIL NFR BLD: 0.6 % (ref 0–7)
ERYTHROCYTE [DISTWIDTH] IN BLOOD BY AUTOMATED COUNT: 15.3 % (ref 11.5–14.5)
EST. AVERAGE GLUCOSE BLD GHB EST-MCNC: 166 MG/DL
GLOBULIN SER CALC-MCNC: 4.1 G/DL (ref 2–4)
GLUCOSE SERPL-MCNC: 108 MG/DL (ref 65–100)
HBA1C MFR BLD: 7.4 % (ref 4–5.6)
HCT VFR BLD AUTO: 43.3 % (ref 36.6–50.3)
HDLC SERPL-MCNC: 66 MG/DL
HDLC SERPL: 2.5 (ref 0–5)
HGB BLD-MCNC: 13.3 G/DL (ref 12.1–17)
IMM GRANULOCYTES # BLD AUTO: 0.02 K/UL (ref 0–0.04)
IMM GRANULOCYTES NFR BLD AUTO: 0.2 % (ref 0–0.5)
LDLC SERPL CALC-MCNC: 83 MG/DL (ref 0–100)
LYMPHOCYTES # BLD: 2.07 K/UL (ref 0.8–3.5)
LYMPHOCYTES NFR BLD: 25.6 % (ref 12–49)
MCH RBC QN AUTO: 29.2 PG (ref 26–34)
MCHC RBC AUTO-ENTMCNC: 30.7 G/DL (ref 30–36.5)
MCV RBC AUTO: 95.2 FL (ref 80–99)
MONOCYTES # BLD: 0.7 K/UL (ref 0–1)
MONOCYTES NFR BLD: 8.7 % (ref 5–13)
NEUTS SEG # BLD: 5.2 K/UL (ref 1.8–8)
NEUTS SEG NFR BLD: 64.4 % (ref 32–75)
NRBC # BLD: 0 K/UL (ref 0–0.01)
NRBC BLD-RTO: 0 PER 100 WBC
PLATELET # BLD AUTO: 210 K/UL (ref 150–400)
PMV BLD AUTO: 11.5 FL (ref 8.9–12.9)
POTASSIUM SERPL-SCNC: 4.1 MMOL/L (ref 3.5–5.1)
PROT SERPL-MCNC: 7.8 G/DL (ref 6.4–8.2)
RBC # BLD AUTO: 4.55 M/UL (ref 4.1–5.7)
SODIUM SERPL-SCNC: 140 MMOL/L (ref 136–145)
TRIGL SERPL-MCNC: 70 MG/DL
VLDLC SERPL CALC-MCNC: 14 MG/DL
WBC # BLD AUTO: 8.1 K/UL (ref 4.1–11.1)

## 2025-05-19 DIAGNOSIS — E11.21 TYPE 2 DIABETES MELLITUS WITH DIABETIC NEPHROPATHY, WITH LONG-TERM CURRENT USE OF INSULIN (HCC): Primary | ICD-10-CM

## 2025-05-19 DIAGNOSIS — Z79.4 TYPE 2 DIABETES MELLITUS WITH DIABETIC NEPHROPATHY, WITH LONG-TERM CURRENT USE OF INSULIN (HCC): Primary | ICD-10-CM

## 2025-05-19 NOTE — TELEPHONE ENCOUNTER
PCP: Roland Costello MD    Last appt:   4/23/2025    Future Appointments   Date Time Provider Department Center   10/23/2025  9:15 AM Roland Costello MD [facility] DEP       Requested Prescriptions     Pending Prescriptions Disp Refills    metFORMIN (GLUCOPHAGE) 1000 MG tablet 180 tablet 3     Sig: Take 1 tablet by mouth 2 times daily (with meals)

## 2025-07-21 RX ORDER — LOSARTAN POTASSIUM 50 MG/1
TABLET ORAL
Qty: 90 TABLET | Refills: 7 | Status: SHIPPED | OUTPATIENT
Start: 2025-07-21

## 2025-08-21 ENCOUNTER — TELEPHONE (OUTPATIENT)
Age: 80
End: 2025-08-21

## 2025-09-02 ENCOUNTER — OFFICE VISIT (OUTPATIENT)
Age: 80
End: 2025-09-02
Payer: MEDICARE

## 2025-09-02 VITALS
DIASTOLIC BLOOD PRESSURE: 72 MMHG | HEART RATE: 75 BPM | OXYGEN SATURATION: 95 % | BODY MASS INDEX: 36.54 KG/M2 | TEMPERATURE: 97.4 F | RESPIRATION RATE: 16 BRPM | SYSTOLIC BLOOD PRESSURE: 138 MMHG | WEIGHT: 232.8 LBS | HEIGHT: 67 IN

## 2025-09-02 DIAGNOSIS — E66.01 MORBID (SEVERE) OBESITY DUE TO EXCESS CALORIES (HCC): ICD-10-CM

## 2025-09-02 DIAGNOSIS — N18.30 STAGE 3 CHRONIC KIDNEY DISEASE, UNSPECIFIED WHETHER STAGE 3A OR 3B CKD (HCC): ICD-10-CM

## 2025-09-02 DIAGNOSIS — I10 ESSENTIAL (PRIMARY) HYPERTENSION: ICD-10-CM

## 2025-09-02 DIAGNOSIS — E11.21 TYPE 2 DIABETES MELLITUS WITH DIABETIC NEPHROPATHY, WITH LONG-TERM CURRENT USE OF INSULIN (HCC): Primary | ICD-10-CM

## 2025-09-02 DIAGNOSIS — Z79.4 TYPE 2 DIABETES MELLITUS WITH DIABETIC NEPHROPATHY, WITH LONG-TERM CURRENT USE OF INSULIN (HCC): Primary | ICD-10-CM

## 2025-09-02 LAB
ALBUMIN SERPL-MCNC: 4 G/DL (ref 3.5–5.2)
ALBUMIN/GLOB SERPL: 1 (ref 1.1–2.2)
ALP SERPL-CCNC: 73 U/L (ref 40–129)
ALT SERPL-CCNC: 17 U/L (ref 10–50)
ANION GAP SERPL CALC-SCNC: 11 MMOL/L (ref 2–14)
AST SERPL-CCNC: 19 U/L (ref 10–50)
BASOPHILS # BLD: 0.05 K/UL (ref 0–0.1)
BASOPHILS NFR BLD: 0.6 % (ref 0–1)
BILIRUB SERPL-MCNC: 1.1 MG/DL (ref 0–1.2)
BUN SERPL-MCNC: 22 MG/DL (ref 8–23)
BUN/CREAT SERPL: 18 (ref 12–20)
CALCIUM SERPL-MCNC: 9.8 MG/DL (ref 8.8–10.2)
CHLORIDE SERPL-SCNC: 105 MMOL/L (ref 98–107)
CHOLEST SERPL-MCNC: 176 MG/DL (ref 0–200)
CO2 SERPL-SCNC: 28 MMOL/L (ref 20–29)
CREAT SERPL-MCNC: 1.23 MG/DL (ref 0.7–1.2)
CREAT UR-MCNC: 89.4 MG/DL (ref 39–259)
DIFFERENTIAL METHOD BLD: ABNORMAL
EOSINOPHIL # BLD: 0.07 K/UL (ref 0–0.4)
EOSINOPHIL NFR BLD: 0.8 % (ref 0–7)
ERYTHROCYTE [DISTWIDTH] IN BLOOD BY AUTOMATED COUNT: 15.6 % (ref 11.5–14.5)
EST. AVERAGE GLUCOSE BLD GHB EST-MCNC: 183 MG/DL
GLOBULIN SER CALC-MCNC: 4 G/DL (ref 2–4)
GLUCOSE SERPL-MCNC: 114 MG/DL (ref 65–100)
HBA1C MFR BLD: 8 % (ref 4–5.6)
HCT VFR BLD AUTO: 44.8 % (ref 36.6–50.3)
HDLC SERPL-MCNC: 69 MG/DL (ref 40–60)
HDLC SERPL: 2.6 (ref 0–5)
HGB BLD-MCNC: 14 G/DL (ref 12.1–17)
IMM GRANULOCYTES # BLD AUTO: 0.04 K/UL (ref 0–0.04)
IMM GRANULOCYTES NFR BLD AUTO: 0.4 % (ref 0–0.5)
LDLC SERPL CALC-MCNC: 91 MG/DL (ref 0–100)
LYMPHOCYTES # BLD: 2.23 K/UL (ref 0.8–3.5)
LYMPHOCYTES NFR BLD: 25 % (ref 12–49)
MCH RBC QN AUTO: 28.7 PG (ref 26–34)
MCHC RBC AUTO-ENTMCNC: 31.3 G/DL (ref 30–36.5)
MCV RBC AUTO: 91.8 FL (ref 80–99)
MICROALBUMIN UR-MCNC: 3.75 MG/DL
MICROALBUMIN/CREAT UR-RTO: 42 MG/G
MONOCYTES # BLD: 0.65 K/UL (ref 0–1)
MONOCYTES NFR BLD: 7.3 % (ref 5–13)
NEUTS SEG # BLD: 5.88 K/UL (ref 1.8–8)
NEUTS SEG NFR BLD: 65.9 % (ref 32–75)
NRBC # BLD: 0 K/UL (ref 0–0.01)
NRBC BLD-RTO: 0 PER 100 WBC
PLATELET # BLD AUTO: 215 K/UL (ref 150–400)
PMV BLD AUTO: 11.3 FL (ref 8.9–12.9)
POTASSIUM SERPL-SCNC: 4.4 MMOL/L (ref 3.5–5.1)
PROT SERPL-MCNC: 8 G/DL (ref 6.4–8.3)
RBC # BLD AUTO: 4.88 M/UL (ref 4.1–5.7)
SODIUM SERPL-SCNC: 144 MMOL/L (ref 136–145)
TRIGL SERPL-MCNC: 79 MG/DL (ref 0–150)
VLDLC SERPL CALC-MCNC: 16 MG/DL
WBC # BLD AUTO: 8.9 K/UL (ref 4.1–11.1)

## 2025-09-02 PROCEDURE — 1123F ACP DISCUSS/DSCN MKR DOCD: CPT | Performed by: INTERNAL MEDICINE

## 2025-09-02 PROCEDURE — 3075F SYST BP GE 130 - 139MM HG: CPT | Performed by: INTERNAL MEDICINE

## 2025-09-02 PROCEDURE — 1159F MED LIST DOCD IN RCRD: CPT | Performed by: INTERNAL MEDICINE

## 2025-09-02 PROCEDURE — 3078F DIAST BP <80 MM HG: CPT | Performed by: INTERNAL MEDICINE

## 2025-09-02 PROCEDURE — G8417 CALC BMI ABV UP PARAM F/U: HCPCS | Performed by: INTERNAL MEDICINE

## 2025-09-02 PROCEDURE — 99214 OFFICE O/P EST MOD 30 MIN: CPT | Performed by: INTERNAL MEDICINE

## 2025-09-02 PROCEDURE — G8427 DOCREV CUR MEDS BY ELIG CLIN: HCPCS | Performed by: INTERNAL MEDICINE

## 2025-09-02 PROCEDURE — 3051F HG A1C>EQUAL 7.0%<8.0%: CPT | Performed by: INTERNAL MEDICINE

## 2025-09-02 PROCEDURE — 1036F TOBACCO NON-USER: CPT | Performed by: INTERNAL MEDICINE

## 2025-09-03 ENCOUNTER — RESULTS FOLLOW-UP (OUTPATIENT)
Age: 80
End: 2025-09-03

## (undated) DEVICE — BASIN EMSIS 16OZ GRAPHITE PLAS KID SHP MOLD GRAD FOR ORAL

## (undated) DEVICE — SOLIDIFIER MEDC 1200ML -- CONVERT TO 356117

## (undated) DEVICE — SYR 10ML LUER LOK 1/5ML GRAD --

## (undated) DEVICE — Device

## (undated) DEVICE — NEONATAL-ADULT SPO2 SENSOR: Brand: NELLCOR

## (undated) DEVICE — TOWEL 4 PLY TISS 19X30 SUE WHT

## (undated) DEVICE — 1200 GUARD II KIT W/5MM TUBE W/O VAC TUBE: Brand: GUARDIAN

## (undated) DEVICE — NEEDLE HYPO 18GA L1.5IN PNK S STL HUB POLYPR SHLD REG BVL

## (undated) DEVICE — Z DISCONTINUED PER MEDLINE LINE GAS SAMPLING O2/CO2 LNG AD 13 FT NSL W/ TBNG FILTERLINE

## (undated) DEVICE — CATH IV AUTOGRD BC PNK 20GA 25 -- INSYTE

## (undated) DEVICE — SYR 3ML LL TIP 1/10ML GRAD --

## (undated) DEVICE — KENDALL RADIOLUCENT FOAM MONITORING ELECTRODE RECTANGULAR SHAPE: Brand: KENDALL

## (undated) DEVICE — SET ADMIN 16ML TBNG L100IN 2 Y INJ SITE IV PIGGY BK DISP